# Patient Record
Sex: MALE | Race: WHITE | NOT HISPANIC OR LATINO | Employment: OTHER | URBAN - METROPOLITAN AREA
[De-identification: names, ages, dates, MRNs, and addresses within clinical notes are randomized per-mention and may not be internally consistent; named-entity substitution may affect disease eponyms.]

---

## 2020-05-07 ENCOUNTER — LAB REQUISITION (OUTPATIENT)
Dept: LAB | Facility: HOSPITAL | Age: 65
End: 2020-05-07

## 2020-05-07 DIAGNOSIS — Z00.00 ENCOUNTER FOR GENERAL ADULT MEDICAL EXAMINATION WITHOUT ABNORMAL FINDINGS: ICD-10-CM

## 2020-05-07 PROCEDURE — U0003 INFECTIOUS AGENT DETECTION BY NUCLEIC ACID (DNA OR RNA); SEVERE ACUTE RESPIRATORY SYNDROME CORONAVIRUS 2 (SARS-COV-2) (CORONAVIRUS DISEASE [COVID-19]), AMPLIFIED PROBE TECHNIQUE, MAKING USE OF HIGH THROUGHPUT TECHNOLOGIES AS DESCRIBED BY CMS-2020-01-R: HCPCS | Performed by: EMERGENCY MEDICINE

## 2020-05-08 LAB — SARS-COV-2 RNA RESP QL NAA+PROBE: NOT DETECTED

## 2020-08-25 ENCOUNTER — HOSPITAL ENCOUNTER (OUTPATIENT)
Dept: GENERAL RADIOLOGY | Facility: HOSPITAL | Age: 65
Discharge: HOME | End: 2020-08-25
Admitting: NURSE PRACTITIONER

## 2020-08-25 ENCOUNTER — OFFICE VISIT (OUTPATIENT)
Dept: FAMILY MEDICINE CLINIC | Facility: CLINIC | Age: 65
End: 2020-08-25

## 2020-08-25 VITALS
HEART RATE: 92 BPM | SYSTOLIC BLOOD PRESSURE: 120 MMHG | HEIGHT: 75 IN | WEIGHT: 222 LBS | DIASTOLIC BLOOD PRESSURE: 74 MMHG | TEMPERATURE: 98.9 F | BODY MASS INDEX: 27.6 KG/M2 | OXYGEN SATURATION: 96 %

## 2020-08-25 DIAGNOSIS — M54.2 NECK PAIN: Primary | ICD-10-CM

## 2020-08-25 DIAGNOSIS — M54.2 NECK PAIN: ICD-10-CM

## 2020-08-25 PROCEDURE — 99213 OFFICE O/P EST LOW 20 MIN: CPT | Performed by: NURSE PRACTITIONER

## 2020-08-25 PROCEDURE — 72040 X-RAY EXAM NECK SPINE 2-3 VW: CPT

## 2020-08-25 RX ORDER — CYCLOBENZAPRINE HCL 10 MG
10 TABLET ORAL 3 TIMES DAILY PRN
Qty: 30 TABLET | Refills: 0 | Status: SHIPPED | OUTPATIENT
Start: 2020-08-25 | End: 2021-08-14

## 2020-08-25 RX ORDER — IBUPROFEN 800 MG/1
800 TABLET ORAL EVERY 8 HOURS PRN
Qty: 30 TABLET | Refills: 1 | Status: SHIPPED | OUTPATIENT
Start: 2020-08-25 | End: 2021-08-14

## 2020-08-25 NOTE — PROGRESS NOTES
Subjective   Gil Garcia is a 65 y.o. male.       HPI   Pt. Is here today with concern of neck pain that is radiating down the right arm X 3-4 days.  No known injury.  He reports symptoms worsen with movement.  Has felt some numb/tingling at times in the arm.  Doesn't like to take medication but has taken otc ibuprofen a few times; can't say for sure if it provided much relief.  Denies any swelling, redness or warmth.        The following portions of the patient's history were reviewed and updated as appropriate: allergies, current medications, past family history, past medical history, past social history, past surgical history and problem list.    Review of Systems   Constitutional: Negative for activity change, appetite change, chills, diaphoresis, fatigue, fever, unexpected weight gain and unexpected weight loss.   Eyes: Negative for blurred vision, double vision and visual disturbance.   Respiratory: Negative for cough, chest tightness, shortness of breath and wheezing.    Cardiovascular: Negative for chest pain, palpitations and leg swelling.   Musculoskeletal: Positive for myalgias and neck pain. Negative for neck stiffness.   Skin: Negative for rash.   Neurological: Negative for dizziness, tremors, syncope, weakness, light-headedness, numbness, headache and confusion.   Psychiatric/Behavioral: Negative for depressed mood. The patient is not nervous/anxious.        Objective   Physical Exam   Constitutional: He is oriented to person, place, and time. He appears well-developed and well-nourished. No distress.   Cardiovascular: Normal rate, regular rhythm, normal heart sounds and intact distal pulses.   No murmur heard.  Pulmonary/Chest: Effort normal and breath sounds normal. No respiratory distress. He has no wheezes. He exhibits no tenderness.   Musculoskeletal: He exhibits no edema or deformity.        Cervical back: He exhibits decreased range of motion, tenderness and spasm (right side trapeizus ). He  exhibits no bony tenderness, no swelling, no edema, no deformity, no laceration and normal pulse.   Neurological: He is alert and oriented to person, place, and time.   Vitals reviewed.        Assessment/Plan   Gil was seen today for neck pain.    Diagnoses and all orders for this visit:    Neck pain  Comments:  Xray of c-spine.   Given Ibuprofen 800 mg PRN and Flexeril PRN.   Heat/ice and rest.   Gentle stretches.   Orders:  -     XR Spine Cervical 2 or 3 View; Future  -     cyclobenzaprine (FLEXERIL) 10 MG tablet; Take 1 tablet by mouth 3 (Three) Times a Day As Needed for Muscle Spasms.  -     ibuprofen (ADVIL,MOTRIN) 800 MG tablet; Take 1 tablet by mouth Every 8 (Eight) Hours As Needed for Mild Pain  or Moderate Pain .

## 2020-08-25 NOTE — PATIENT INSTRUCTIONS
Cervical Radiculopathy    Cervical radiculopathy means that a nerve in the neck (a cervical nerve) is pinched or bruised. This can happen because of an injury to the cervical spine (vertebrae) in the neck, or as a normal part of getting older. This can cause pain or loss of feeling (numbness) that runs from your neck all the way down to your arm and fingers. Often, this condition gets better with rest. Treatment may be needed if the condition does not get better.  What are the causes?  · A neck injury.  · A bulging disk in your spine.  · Muscle movements that you cannot control (muscle spasms).  · Tight muscles in your neck due to overuse.  · Arthritis.  · Breakdown in the bones and joints of the spine (spondylosis) due to getting older.  · Bone spurs that form near the nerves in the neck.  What are the signs or symptoms?  · Pain. The pain may:  ? Run from the neck to the arm and hand.  ? Be very bad or irritating.  ? Be worse when you move your neck.  · Loss of feeling or tingling in your arm or hand.  · Weakness in your arm or hand, in very bad cases.  How is this treated?  In many cases, treatment is not needed for this condition. With rest, the condition often gets better over time. If treatment is needed, options may include:  · Wearing a soft neck collar (cervical collar) for short periods of time, as told by your doctor.  · Doing exercises (physical therapy) to strengthen your neck muscles.  · Taking medicines.  · Having shots (injections) in your spine, in very bad cases.  · Having surgery. This may be needed if other treatments do not help. The type of surgery that is used depends on the cause of your condition.  Follow these instructions at home:  If you have a soft neck collar:  · Wear it as told by your doctor. Remove it only as told by your doctor.  · Ask your doctor if you can remove the collar for cleaning and bathing. If you are allowed to remove the collar for cleaning or bathing:  ? Follow  instructions from your doctor about how to remove the collar safely.  ? Clean the collar by wiping it with mild soap and water and drying it completely.  ? Take out any removable pads in the collar every 1-2 days. Wash them by hand with soap and water. Let them air-dry completely before you put them back in the collar.  ? Check your skin under the collar for redness or sores. If you see any, tell your doctor.  Managing pain         · Take over-the-counter and prescription medicines only as told by your doctor.  · If told, put ice on the painful area.  ? If you have a soft neck collar, remove it as told by your doctor.  ? Put ice in a plastic bag.  ? Place a towel between your skin and the bag.  ? Leave the ice on for 20 minutes, 2-3 times a day.  · If using ice does not help, you can try using heat. Use the heat source that your doctor recommends, such as a moist heat pack or a heating pad.  ? Place a towel between your skin and the heat source.  ? Leave the heat on for 20-30 minutes.  ? Remove the heat if your skin turns bright red. This is very important if you are unable to feel pain, heat, or cold. You may have a greater risk of getting burned.  · You may try a gentle neck and shoulder rub (massage).  Activity  · Rest as needed.  · Return to your normal activities as told by your doctor. Ask your doctor what activities are safe for you.  · Do exercises as told by your doctor or physical therapist.  · Do not lift anything that is heavier than 10 lb (4.5 kg) until your doctor tells you that it is safe.  General instructions  · Use a flat pillow when you sleep.  · Do not drive while wearing a soft neck collar. If you do not have a soft neck collar, ask your doctor if it is safe to drive while your neck heals.  · Ask your doctor if the medicine prescribed to you requires you to avoid driving or using heavy machinery.  · Do not use any products that contain nicotine or tobacco, such as cigarettes, e-cigarettes, and  chewing tobacco. These can delay healing. If you need help quitting, ask your doctor.  · Keep all follow-up visits as told by your doctor. This is important.  Contact a doctor if:  · Your condition does not get better with treatment.  Get help right away if:  · Your pain gets worse and is not helped with medicine.  · You lose feeling or feel weak in your hand, arm, face, or leg.  · You have a high fever.  · You have a stiff neck.  · You cannot control when you poop or pee (have incontinence).  · You have trouble with walking, balance, or talking.  Summary  · Cervical radiculopathy means that a nerve in the neck is pinched or bruised.  · A nerve can get pinched from a bulging disk, arthritis, an injury to the neck, or other causes.  · Symptoms include pain, tingling, or loss of feeling that goes from the neck into the arm or hand.  · Weakness in your arm or hand can happen in very bad cases.  · Treatment may include resting, wearing a soft neck collar, and doing exercises. You might need to take medicines for pain. In very bad cases, shots or surgery may be needed.  This information is not intended to replace advice given to you by your health care provider. Make sure you discuss any questions you have with your health care provider.  Document Released: 12/06/2012 Document Revised: 11/08/2019 Document Reviewed: 11/08/2019  Elsevier Patient Education © 2020 [employer]

## 2020-09-14 ENCOUNTER — TELEPHONE (OUTPATIENT)
Dept: FAMILY MEDICINE CLINIC | Facility: CLINIC | Age: 65
End: 2020-09-14

## 2020-09-14 DIAGNOSIS — M25.511 ACUTE PAIN OF RIGHT SHOULDER: Primary | ICD-10-CM

## 2020-09-14 NOTE — TELEPHONE ENCOUNTER
"Patient is still having a lot of issues with his neck, shoulder and arm. He said his meds are gone and the ibuprofen only eased the pain a little. He said the pain would return full force after about 4-5 hours after taking the ibuprofen. He said the muscle relaxer provided no relief. He said the mornings are really bad and almost \"too much\" he has to use his arms a lot at work and says it is progressively getting worse throughout the day. He is wanting to know what he should do?  "

## 2020-09-21 ENCOUNTER — OFFICE VISIT (OUTPATIENT)
Dept: ORTHOPEDIC SURGERY | Facility: CLINIC | Age: 65
End: 2020-09-21

## 2020-09-21 VITALS
BODY MASS INDEX: 28.1 KG/M2 | SYSTOLIC BLOOD PRESSURE: 157 MMHG | HEIGHT: 75 IN | HEART RATE: 91 BPM | WEIGHT: 226 LBS | DIASTOLIC BLOOD PRESSURE: 88 MMHG

## 2020-09-21 DIAGNOSIS — M25.511 ACUTE PAIN OF RIGHT SHOULDER: Primary | ICD-10-CM

## 2020-09-21 DIAGNOSIS — M54.12 CERVICAL RADICULOPATHY: ICD-10-CM

## 2020-09-21 PROCEDURE — 99203 OFFICE O/P NEW LOW 30 MIN: CPT | Performed by: ORTHOPAEDIC SURGERY

## 2020-09-21 NOTE — PROGRESS NOTES
"     Patient ID: Gil Garcia is a 65 y.o. male.    Chief Complaint:    Chief Complaint   Patient presents with   • Right Shoulder - Initial Evaluation, Pain       HPI:  Gil is a 65-year-old gentleman here in consultation for neck and right arm pain from Flavia Duggan.  Each has been present for about a month.  There is no injury.  He has pain at the base of the neck into the shoulder blade and down the arm.  He also has pain when lifting and moving his arm.  It hurts to lie on his right side.  He has been taking ibuprofen and a muscle relaxant from his primary care physician as well as doing some neck and shoulder rehab therapy exercises without significant relief.  Pain is sharp and a 6/10  Past Medical History:   Diagnosis Date   • Kidney stone        No past surgical history on file.    No family history on file.       Social History     Occupational History   • Not on file   Tobacco Use   • Smoking status: Current Every Day Smoker   • Smokeless tobacco: Never Used   Substance and Sexual Activity   • Alcohol use: Not Currently   • Drug use: Not Currently   • Sexual activity: Not on file      Review of Systems   Cardiovascular: Negative for chest pain.   Musculoskeletal: Positive for arthralgias.       Objective:    /88   Pulse 91   Ht 190.5 cm (75\")   Wt 103 kg (226 lb)   BMI 28.25 kg/m²     Physical Examination:  He is a pleasant male in no distress. He is alert and oriented x3 and appears his stated age.  Right shoulder demonstrates no scars and no atrophy without areas of tenderness.  He is limited range of motion with a positive Spurling to the left and right.  Shoulder passive elevation is 160 degrees abduction 120 degrees X rotation 40 degrees.  He has mild pain and weakness on Speed, Carlos, supraspinatus testing.  Belly press and liftoff are 5/5 in 4/5.Sensory and motor exam are intact all distributions. Radial pulse is palpable and capillary refill is less than two seconds to all " digits    Imaging:  X-rays of the neck demonstrate arthritis at the C3-4 level, x-rays of the shoulder demonstrate impingement with well-maintained joint spaces    Assessment:  Right shoulder pain possible cuff tear and neck pain with possible cervical radiculopathy    Plan:  Recommend MRI of the neck and shoulder to evaluate for cervical issues versus cuff pathology.  See me after imaging          Disclaimer: Please note that areas of this note were completed with computer voice recognition software.  Quite often unanticipated grammatical, syntax, homophones, and other interpretive errors are inadvertently transcribed by the computer software. Please excuse any errors that have escaped final proofreading.

## 2021-02-03 ENCOUNTER — TELEPHONE (OUTPATIENT)
Dept: FAMILY MEDICINE CLINIC | Facility: CLINIC | Age: 66
End: 2021-02-03

## 2021-02-03 NOTE — TELEPHONE ENCOUNTER
PATIENT HAS TESTED POSITIVE FOR COVID.  HE IS NOT EXPERIENCING ANY SYMPTOMS.  PATIENT STATES HIS WIFE IS BEING TREATED AT [facility] WITH   REGENERON ANTIBODY INFUSION.  PATIENT WANTS TO KNOW IF HE CAN ALSO GET THIS INFUSION.  PATIENT HAS A SCHEDULED COVID VACCINE ON 2/20/21.  PLEASE ADVISE    PATIENT CALL BACK #: [phone]

## 2021-02-03 NOTE — TELEPHONE ENCOUNTER
Caller: Gil Garcia    Relationship: Self    Best call back number: [phone]    PATIENT RETURNED CALL, STATED HE IS NOT REGISTERED ON [facility] AND IS NOT FAMILIAR WITH HOW TO UPLOAD A COPY OF HIS TEST RESULT THERE    PATIENT WOULD LIKE TO CHECK IF THERE IS AN ALTERNATIVE OPTION

## 2021-02-03 NOTE — TELEPHONE ENCOUNTER
If he is asymptomatic, he does not meet the criteria for the antibody infusion.      It is recommended he wait until he is 90 days from the date of his positive result to get the vaccine.      Can he get us a copy of his positive result?

## 2021-02-08 ENCOUNTER — HOSPITAL ENCOUNTER (OUTPATIENT)
Dept: INFUSION THERAPY | Facility: HOSPITAL | Age: 66
Discharge: HOME | End: 2021-02-08
Admitting: NURSE PRACTITIONER

## 2021-02-08 ENCOUNTER — TELEPHONE (OUTPATIENT)
Dept: FAMILY MEDICINE CLINIC | Facility: CLINIC | Age: 66
End: 2021-02-08

## 2021-02-08 VITALS
HEART RATE: 85 BPM | TEMPERATURE: 99.8 F | OXYGEN SATURATION: 97 % | DIASTOLIC BLOOD PRESSURE: 91 MMHG | SYSTOLIC BLOOD PRESSURE: 152 MMHG | RESPIRATION RATE: 20 BRPM

## 2021-02-08 DIAGNOSIS — U07.1 COVID-19: Primary | ICD-10-CM

## 2021-02-08 PROCEDURE — M0239 BAMLANIVIMAB-XXXX INFUSION: HCPCS

## 2021-02-08 PROCEDURE — 25010000006 BAMLANIVIMAB 700 MG/20ML SOLUTION 20 ML VIAL

## 2021-02-08 PROCEDURE — 96365 THER/PROPH/DIAG IV INF INIT: CPT

## 2021-02-08 RX ORDER — DIPHENHYDRAMINE HYDROCHLORIDE 50 MG/ML
50 INJECTION INTRAMUSCULAR; INTRAVENOUS AS NEEDED
Status: CANCELLED | OUTPATIENT
Start: 2021-02-08

## 2021-02-08 RX ORDER — DIPHENHYDRAMINE HYDROCHLORIDE 50 MG/ML
50 INJECTION INTRAMUSCULAR; INTRAVENOUS AS NEEDED
Status: DISCONTINUED | OUTPATIENT
Start: 2021-02-08 | End: 2021-02-10 | Stop reason: HOSPADM

## 2021-02-08 RX ORDER — EPINEPHRINE 1 MG/ML
0.3 INJECTION, SOLUTION, CONCENTRATE INTRAVENOUS AS NEEDED
Status: CANCELLED | OUTPATIENT
Start: 2021-02-08

## 2021-02-08 RX ORDER — METHYLPREDNISOLONE SODIUM SUCCINATE 125 MG/2ML
125 INJECTION, POWDER, LYOPHILIZED, FOR SOLUTION INTRAMUSCULAR; INTRAVENOUS AS NEEDED
Status: CANCELLED | OUTPATIENT
Start: 2021-02-08

## 2021-02-08 RX ORDER — EPINEPHRINE 1 MG/ML
0.3 INJECTION, SOLUTION, CONCENTRATE INTRAVENOUS AS NEEDED
Status: DISCONTINUED | OUTPATIENT
Start: 2021-02-08 | End: 2021-02-10 | Stop reason: HOSPADM

## 2021-02-08 RX ORDER — EPINEPHRINE 1 MG/ML
0.3 INJECTION, SOLUTION INTRAMUSCULAR; SUBCUTANEOUS AS NEEDED
Status: CANCELLED | OUTPATIENT
Start: 2021-02-08

## 2021-02-08 RX ORDER — METHYLPREDNISOLONE SODIUM SUCCINATE 125 MG/2ML
125 INJECTION, POWDER, LYOPHILIZED, FOR SOLUTION INTRAMUSCULAR; INTRAVENOUS AS NEEDED
Status: DISCONTINUED | OUTPATIENT
Start: 2021-02-08 | End: 2021-02-10 | Stop reason: HOSPADM

## 2021-02-08 RX ADMIN — SODIUM CHLORIDE 700 MG: 9 INJECTION, SOLUTION INTRAVENOUS at 15:46

## 2021-02-08 NOTE — TELEPHONE ENCOUNTER
Patient contacted and did give verbal consent for the COVID-19 Monoclonal Antibody Infusion.  Pt. verbally given fact sheet regarding infusion; informed of alternatives to the infusion and explained that the infusion is an unapproved drug authorized for emergency use.  He verbalized understanding.     Will fax order to [facility] now (Janine).

## 2021-02-08 NOTE — PROGRESS NOTES
"Pump continuously alarmed for \"Air in Line.\" Multiple attempts were made to ensure no air was in the line. Two different pumps were used as well as three channels. Second RN, Tobi, present to inspect for errors. After talking with Kellie in the pharmacy we deduced that the tubing is more likely the culprit, but since the tubing had been primed and we did not want to waste the medication, we administered the medicine at 87.5 gtts. Medicine administered without any further complications.  "

## 2021-02-08 NOTE — TELEPHONE ENCOUNTER
Provider: Mary Duggan APRN  Caller: Gil Garcia   Relationship to Patient: (Self)    Phone Number: [phone] (H)  Reason for Call: PATIENT CALLED TO LET MARY DUGGAN KNOW ABOUT HIS SYMPTOMS OF COVID- LOSS OF TASTE AND SMELL IS GONE- PULSE [facility]X- [address] ALL WEEKEND. PATIENT IS ALSO WANTING TO KNOW IF HE CAN HAVE THE ANTIBODY INFUSION DONE AT [facility].     PLEASE CONTACT TO ADVISE.       THANKS

## 2021-08-14 ENCOUNTER — APPOINTMENT (OUTPATIENT)
Dept: GENERAL RADIOLOGY | Facility: HOSPITAL | Age: 66
End: 2021-08-14

## 2021-08-14 ENCOUNTER — APPOINTMENT (OUTPATIENT)
Dept: CT IMAGING | Facility: HOSPITAL | Age: 66
End: 2021-08-14

## 2021-08-14 ENCOUNTER — HOSPITAL ENCOUNTER (INPATIENT)
Facility: HOSPITAL | Age: 66
LOS: 2 days | Discharge: HOME | End: 2021-08-17
Attending: EMERGENCY MEDICINE | Admitting: INTERNAL MEDICINE

## 2021-08-14 DIAGNOSIS — R07.9 CHEST PAIN, UNSPECIFIED TYPE: ICD-10-CM

## 2021-08-14 DIAGNOSIS — I20.0 UNSTABLE ANGINA (HCC): Primary | ICD-10-CM

## 2021-08-14 LAB
ALBUMIN SERPL-MCNC: 4 G/DL (ref 3.5–5.2)
ALBUMIN/GLOB SERPL: 1.3 G/DL
ALP SERPL-CCNC: 74 U/L (ref 39–117)
ALT SERPL W P-5'-P-CCNC: 36 U/L (ref 1–41)
ANION GAP SERPL CALCULATED.3IONS-SCNC: 15 MMOL/L (ref 5–15)
APTT PPP: 21.8 SECONDS (ref 24–31)
AST SERPL-CCNC: 25 U/L (ref 1–40)
BASOPHILS # BLD AUTO: 0.1 10*3/MM3 (ref 0–0.2)
BASOPHILS NFR BLD AUTO: 1.3 % (ref 0–1.5)
BILIRUB SERPL-MCNC: 0.4 MG/DL (ref 0–1.2)
BUN SERPL-MCNC: 21 MG/DL (ref 8–23)
BUN/CREAT SERPL: 22.8 (ref 7–25)
CALCIUM SPEC-SCNC: 9.2 MG/DL (ref 8.6–10.5)
CHLORIDE SERPL-SCNC: 104 MMOL/L (ref 98–107)
CHOLEST SERPL-MCNC: 207 MG/DL (ref 0–200)
CO2 SERPL-SCNC: 20 MMOL/L (ref 22–29)
CREAT SERPL-MCNC: 0.92 MG/DL (ref 0.76–1.27)
D DIMER PPP FEU-MCNC: 0.75 MG/L (FEU) (ref 0–0.59)
DEPRECATED RDW RBC AUTO: 44.2 FL (ref 37–54)
EOSINOPHIL # BLD AUTO: 0.3 10*3/MM3 (ref 0–0.4)
EOSINOPHIL NFR BLD AUTO: 2.6 % (ref 0.3–6.2)
ERYTHROCYTE [DISTWIDTH] IN BLOOD BY AUTOMATED COUNT: 14.9 % (ref 12.3–15.4)
GFR SERPL CREATININE-BSD FRML MDRD: 82 ML/MIN/1.73
GLOBULIN UR ELPH-MCNC: 3.1 GM/DL
GLUCOSE SERPL-MCNC: 148 MG/DL (ref 65–99)
HCT VFR BLD AUTO: 48.6 % (ref 37.5–51)
HDLC SERPL-MCNC: 38 MG/DL (ref 40–60)
HGB BLD-MCNC: 16.3 G/DL (ref 13–17.7)
INR PPP: 0.97 (ref 0.93–1.1)
LDLC SERPL CALC-MCNC: 150 MG/DL (ref 0–100)
LDLC/HDLC SERPL: 3.89 {RATIO}
LYMPHOCYTES # BLD AUTO: 3.5 10*3/MM3 (ref 0.7–3.1)
LYMPHOCYTES NFR BLD AUTO: 35.8 % (ref 19.6–45.3)
MCH RBC QN AUTO: 28.2 PG (ref 26.6–33)
MCHC RBC AUTO-ENTMCNC: 33.6 G/DL (ref 31.5–35.7)
MCV RBC AUTO: 84.1 FL (ref 79–97)
MONOCYTES # BLD AUTO: 0.6 10*3/MM3 (ref 0.1–0.9)
MONOCYTES NFR BLD AUTO: 5.9 % (ref 5–12)
NEUTROPHILS NFR BLD AUTO: 5.3 10*3/MM3 (ref 1.7–7)
NEUTROPHILS NFR BLD AUTO: 54.4 % (ref 42.7–76)
NRBC BLD AUTO-RTO: 0.1 /100 WBC (ref 0–0.2)
PLATELET # BLD AUTO: 266 10*3/MM3 (ref 140–450)
PMV BLD AUTO: 8.5 FL (ref 6–12)
POTASSIUM SERPL-SCNC: 3.6 MMOL/L (ref 3.5–5.2)
PROT SERPL-MCNC: 7.1 G/DL (ref 6–8.5)
PROTHROMBIN TIME: 10.8 SECONDS (ref 9.6–11.7)
QT INTERVAL: 414 MS
RBC # BLD AUTO: 5.78 10*6/MM3 (ref 4.14–5.8)
SARS-COV-2 RNA PNL SPEC NAA+PROBE: NOT DETECTED
SODIUM SERPL-SCNC: 139 MMOL/L (ref 136–145)
TRIGL SERPL-MCNC: 106 MG/DL (ref 0–150)
TROPONIN T SERPL-MCNC: 0.03 NG/ML (ref 0–0.03)
TROPONIN T SERPL-MCNC: <0.01 NG/ML (ref 0–0.03)
VLDLC SERPL-MCNC: 19 MG/DL (ref 5–40)
WBC # BLD AUTO: 9.8 10*3/MM3 (ref 3.4–10.8)

## 2021-08-14 PROCEDURE — 87635 SARS-COV-2 COVID-19 AMP PRB: CPT | Performed by: EMERGENCY MEDICINE

## 2021-08-14 PROCEDURE — 85025 COMPLETE CBC W/AUTO DIFF WBC: CPT | Performed by: EMERGENCY MEDICINE

## 2021-08-14 PROCEDURE — 71275 CT ANGIOGRAPHY CHEST: CPT

## 2021-08-14 PROCEDURE — 0 IOPAMIDOL PER 1 ML: Performed by: EMERGENCY MEDICINE

## 2021-08-14 PROCEDURE — 99222 1ST HOSP IP/OBS MODERATE 55: CPT | Performed by: NURSE PRACTITIONER

## 2021-08-14 PROCEDURE — 71045 X-RAY EXAM CHEST 1 VIEW: CPT

## 2021-08-14 PROCEDURE — 25010000002 MORPHINE PER 10 MG: Performed by: NURSE PRACTITIONER

## 2021-08-14 PROCEDURE — 93005 ELECTROCARDIOGRAM TRACING: CPT

## 2021-08-14 PROCEDURE — 99285 EMERGENCY DEPT VISIT HI MDM: CPT

## 2021-08-14 PROCEDURE — 25010000002 HEPARIN (PORCINE) 25000-0.45 UT/250ML-% SOLUTION: Performed by: EMERGENCY MEDICINE

## 2021-08-14 PROCEDURE — 93005 ELECTROCARDIOGRAM TRACING: CPT | Performed by: EMERGENCY MEDICINE

## 2021-08-14 PROCEDURE — 83036 HEMOGLOBIN GLYCOSYLATED A1C: CPT | Performed by: NURSE PRACTITIONER

## 2021-08-14 PROCEDURE — 85730 THROMBOPLASTIN TIME PARTIAL: CPT | Performed by: EMERGENCY MEDICINE

## 2021-08-14 PROCEDURE — G0378 HOSPITAL OBSERVATION PER HR: HCPCS

## 2021-08-14 PROCEDURE — 36415 COLL VENOUS BLD VENIPUNCTURE: CPT | Performed by: EMERGENCY MEDICINE

## 2021-08-14 PROCEDURE — 85610 PROTHROMBIN TIME: CPT | Performed by: EMERGENCY MEDICINE

## 2021-08-14 PROCEDURE — 84484 ASSAY OF TROPONIN QUANT: CPT | Performed by: EMERGENCY MEDICINE

## 2021-08-14 PROCEDURE — 99205 OFFICE O/P NEW HI 60 MIN: CPT | Performed by: INTERNAL MEDICINE

## 2021-08-14 PROCEDURE — 80061 LIPID PANEL: CPT | Performed by: NURSE PRACTITIONER

## 2021-08-14 PROCEDURE — 80053 COMPREHEN METABOLIC PANEL: CPT | Performed by: EMERGENCY MEDICINE

## 2021-08-14 PROCEDURE — 85379 FIBRIN DEGRADATION QUANT: CPT | Performed by: EMERGENCY MEDICINE

## 2021-08-14 RX ORDER — SODIUM CHLORIDE 0.9 % (FLUSH) 0.9 %
10 SYRINGE (ML) INJECTION AS NEEDED
Status: DISCONTINUED | OUTPATIENT
Start: 2021-08-14 | End: 2021-08-17 | Stop reason: HOSPADM

## 2021-08-14 RX ORDER — MORPHINE SULFATE 4 MG/ML
2 INJECTION, SOLUTION INTRAMUSCULAR; INTRAVENOUS ONCE
Status: COMPLETED | OUTPATIENT
Start: 2021-08-14 | End: 2021-08-14

## 2021-08-14 RX ORDER — SODIUM CHLORIDE 0.9 % (FLUSH) 0.9 %
10 SYRINGE (ML) INJECTION EVERY 12 HOURS SCHEDULED
Status: DISCONTINUED | OUTPATIENT
Start: 2021-08-14 | End: 2021-08-17 | Stop reason: HOSPADM

## 2021-08-14 RX ORDER — ASPIRIN 81 MG/1
324 TABLET, CHEWABLE ORAL ONCE
Status: DISCONTINUED | OUTPATIENT
Start: 2021-08-14 | End: 2021-08-17 | Stop reason: HOSPADM

## 2021-08-14 RX ORDER — ONDANSETRON 2 MG/ML
4 INJECTION INTRAMUSCULAR; INTRAVENOUS EVERY 6 HOURS PRN
Status: DISCONTINUED | OUTPATIENT
Start: 2021-08-14 | End: 2021-08-17 | Stop reason: HOSPADM

## 2021-08-14 RX ORDER — MAGNESIUM SULFATE 1 G/100ML
1 INJECTION INTRAVENOUS AS NEEDED
Status: DISCONTINUED | OUTPATIENT
Start: 2021-08-14 | End: 2021-08-17 | Stop reason: HOSPADM

## 2021-08-14 RX ORDER — ASPIRIN 81 MG/1
81 TABLET ORAL DAILY
Status: DISCONTINUED | OUTPATIENT
Start: 2021-08-15 | End: 2021-08-17 | Stop reason: HOSPADM

## 2021-08-14 RX ORDER — ATORVASTATIN CALCIUM 20 MG/1
20 TABLET, FILM COATED ORAL NIGHTLY
Status: DISCONTINUED | OUTPATIENT
Start: 2021-08-14 | End: 2021-08-15

## 2021-08-14 RX ORDER — ACETAMINOPHEN 160 MG/5ML
650 SOLUTION ORAL EVERY 4 HOURS PRN
Status: DISCONTINUED | OUTPATIENT
Start: 2021-08-14 | End: 2021-08-17 | Stop reason: HOSPADM

## 2021-08-14 RX ORDER — ONDANSETRON 4 MG/1
4 TABLET, FILM COATED ORAL EVERY 6 HOURS PRN
Status: DISCONTINUED | OUTPATIENT
Start: 2021-08-14 | End: 2021-08-17 | Stop reason: HOSPADM

## 2021-08-14 RX ORDER — CLOPIDOGREL BISULFATE 75 MG/1
75 TABLET ORAL DAILY
Status: DISCONTINUED | OUTPATIENT
Start: 2021-08-15 | End: 2021-08-15

## 2021-08-14 RX ORDER — CLOPIDOGREL BISULFATE 75 MG/1
600 TABLET ORAL ONCE
Status: COMPLETED | OUTPATIENT
Start: 2021-08-14 | End: 2021-08-14

## 2021-08-14 RX ORDER — ALUMINA, MAGNESIA, AND SIMETHICONE 2400; 2400; 240 MG/30ML; MG/30ML; MG/30ML
15 SUSPENSION ORAL EVERY 6 HOURS PRN
Status: DISCONTINUED | OUTPATIENT
Start: 2021-08-14 | End: 2021-08-17 | Stop reason: HOSPADM

## 2021-08-14 RX ORDER — NITROGLYCERIN 0.4 MG/1
0.4 TABLET SUBLINGUAL
Status: DISCONTINUED | OUTPATIENT
Start: 2021-08-14 | End: 2021-08-17 | Stop reason: HOSPADM

## 2021-08-14 RX ORDER — POTASSIUM CHLORIDE 20 MEQ/1
40 TABLET, EXTENDED RELEASE ORAL AS NEEDED
Status: DISCONTINUED | OUTPATIENT
Start: 2021-08-14 | End: 2021-08-17 | Stop reason: HOSPADM

## 2021-08-14 RX ORDER — LISINOPRIL 5 MG/1
5 TABLET ORAL DAILY
Status: DISCONTINUED | OUTPATIENT
Start: 2021-08-15 | End: 2021-08-15

## 2021-08-14 RX ORDER — HEPARIN SODIUM 10000 [USP'U]/100ML
9.8 INJECTION, SOLUTION INTRAVENOUS
Status: DISCONTINUED | OUTPATIENT
Start: 2021-08-14 | End: 2021-08-15

## 2021-08-14 RX ORDER — ACETAMINOPHEN 325 MG/1
650 TABLET ORAL EVERY 4 HOURS PRN
Status: DISCONTINUED | OUTPATIENT
Start: 2021-08-14 | End: 2021-08-17 | Stop reason: HOSPADM

## 2021-08-14 RX ORDER — ACETAMINOPHEN 650 MG/1
650 SUPPOSITORY RECTAL EVERY 4 HOURS PRN
Status: DISCONTINUED | OUTPATIENT
Start: 2021-08-14 | End: 2021-08-17 | Stop reason: HOSPADM

## 2021-08-14 RX ORDER — MAGNESIUM SULFATE HEPTAHYDRATE 40 MG/ML
2 INJECTION, SOLUTION INTRAVENOUS AS NEEDED
Status: DISCONTINUED | OUTPATIENT
Start: 2021-08-14 | End: 2021-08-17 | Stop reason: HOSPADM

## 2021-08-14 RX ORDER — CHOLECALCIFEROL (VITAMIN D3) 125 MCG
5 CAPSULE ORAL NIGHTLY PRN
Status: DISCONTINUED | OUTPATIENT
Start: 2021-08-14 | End: 2021-08-17 | Stop reason: HOSPADM

## 2021-08-14 RX ADMIN — ATORVASTATIN CALCIUM 20 MG: 20 TABLET, FILM COATED ORAL at 23:52

## 2021-08-14 RX ADMIN — MORPHINE SULFATE 2 MG: 4 INJECTION INTRAVENOUS at 21:53

## 2021-08-14 RX ADMIN — HEPARIN SODIUM 9.8 UNITS/KG/HR: 10000 INJECTION, SOLUTION INTRAVENOUS at 19:03

## 2021-08-14 RX ADMIN — CLOPIDOGREL BISULFATE 600 MG: 75 TABLET ORAL at 18:55

## 2021-08-14 RX ADMIN — IOPAMIDOL 100 ML: 755 INJECTION, SOLUTION INTRAVENOUS at 17:32

## 2021-08-14 RX ADMIN — SODIUM CHLORIDE 500 ML: 9 INJECTION, SOLUTION INTRAVENOUS at 16:20

## 2021-08-14 RX ADMIN — METOPROLOL TARTRATE 25 MG: 25 TABLET, FILM COATED ORAL at 23:52

## 2021-08-14 NOTE — ED PROVIDER NOTES
"Subjective   Chief complaint: Patient is a pleasant 66-year-old who presents with chest pain.  He states that he was on the couch.  No exertional activity today.  He went up to get the door for his wife.  He states he got overwhelmingly \"hot\".  He felt weak all over and fell to the floor.  He developed midsternal chest pain to his back down both arms.  He states that the chest pain is now gone.  He still feels a lot of pain in his arms.  He does not see a physician on a regular basis.  He has a strong family history.  EMS gave aspirin in route.    Context: As above    Duration: Just prior to arrival under an hour    Timing: Sudden onset.    Severity: Severe symptoms now moderate    Associated Symptoms: Negative except as noted above.  Appropriate PPE was used.        PCP:            Review of Systems   Constitutional: Positive for fatigue. Negative for fever.   HENT: Negative.    Eyes: Negative.    Respiratory: Negative for shortness of breath.    Cardiovascular: Positive for chest pain.   Gastrointestinal: Negative for abdominal pain.   Musculoskeletal: Positive for back pain.        Bilateral arm pain   All other systems reviewed and are negative.      Past Medical History:   Diagnosis Date   • Kidney stone        No Known Allergies    No past surgical history on file.    No family history on file.    Social History     Socioeconomic History   • Marital status:      Spouse name: Not on file   • Number of children: Not on file   • Years of education: Not on file   • Highest education level: Not on file   Tobacco Use   • Smoking status: Current Every Day Smoker     Packs/day: 0.25   • Smokeless tobacco: Never Used   Substance and Sexual Activity   • Alcohol use: Not Currently   • Drug use: Not Currently           Objective   Physical Exam  Vitals and nursing note reviewed.   Constitutional:       Appearance: He is well-developed.   HENT:      Head: Normocephalic.   Eyes:      Extraocular Movements: " Extraocular movements intact.      Pupils: Pupils are equal, round, and reactive to light.   Cardiovascular:      Heart sounds: Normal heart sounds.   Pulmonary:      Effort: Pulmonary effort is normal.      Breath sounds: Normal breath sounds.   Abdominal:      Tenderness: There is no abdominal tenderness.   Musculoskeletal:      Right lower leg: No tenderness. No edema.      Left lower leg: No tenderness. No edema.   Skin:     General: Skin is warm and dry.   Neurological:      General: No focal deficit present.      Mental Status: He is alert and oriented to person, place, and time.   Psychiatric:         Mood and Affect: Mood normal.         Behavior: Behavior normal.         Procedures           ED Course  ED Course as of Aug 14 1807   Sat Aug 14, 2021   1707 I spoke with Dr. Cruz    [LH]   1730 I spoke with  regarding the patient.  We went over the EKGs.  The inferior ST segments are concerning.  They are not greater than 1 mm elevated though.  Patient is not having active chest pain now.  His arm still feel uncomfortable and with mild discomfort.  He recommends Plavix load 600 mg and heparin drip.  With cycle enzymes.  If CT obtained start after CTA report    [LH]      ED Course User Index  [LH] Herve Johnson,       EKG interpreted myself shows sinus rhythm rate of 59.  There are some very mild ST changes inferior leads.  No reciprocal changes.  Repeat EKG interpreted by myself shows sinus rhythm rate of 62.  Still with subtle ST changes inferior leads.  No reciprocal changes.           Results for orders placed or performed during the hospital encounter of 08/14/21   COVID-19,CEPHEID/DANE/BDMAX,COR/EMMANUEL/PAD/BRENDA IN-HOUSE(OR EMERGENT/ADD-ON),NP SWAB IN TRANSPORT MEDIA 3-4 HR TAT, RT-PCR - Swab, Nasopharynx    Specimen: Nasopharynx; Swab   Result Value Ref Range    COVID19 Not Detected Not Detected - Ref. Range   Comprehensive Metabolic Panel    Specimen: Blood   Result Value Ref Range     Glucose 148 (H) 65 - 99 mg/dL    BUN 21 8 - 23 mg/dL    Creatinine 0.92 0.76 - 1.27 mg/dL    Sodium 139 136 - 145 mmol/L    Potassium 3.6 3.5 - 5.2 mmol/L    Chloride 104 98 - 107 mmol/L    CO2 20.0 (L) 22.0 - 29.0 mmol/L    Calcium 9.2 8.6 - 10.5 mg/dL    Total Protein 7.1 6.0 - 8.5 g/dL    Albumin 4.00 3.50 - 5.20 g/dL    ALT (SGPT) 36 1 - 41 U/L    AST (SGOT) 25 1 - 40 U/L    Alkaline Phosphatase 74 39 - 117 U/L    Total Bilirubin 0.4 0.0 - 1.2 mg/dL    eGFR Non African Amer 82 >60 mL/min/1.73    Globulin 3.1 gm/dL    A/G Ratio 1.3 g/dL    BUN/Creatinine Ratio 22.8 7.0 - 25.0    Anion Gap 15.0 5.0 - 15.0 mmol/L   Protime-INR    Specimen: Blood   Result Value Ref Range    Protime 10.8 9.6 - 11.7 Seconds    INR 0.97 0.93 - 1.10   aPTT    Specimen: Blood   Result Value Ref Range    PTT 21.8 (L) 24.0 - 31.0 seconds   Troponin    Specimen: Blood   Result Value Ref Range    Troponin T <0.010 0.000 - 0.030 ng/mL   CBC Auto Differential    Specimen: Blood   Result Value Ref Range    WBC 9.80 3.40 - 10.80 10*3/mm3    RBC 5.78 4.14 - 5.80 10*6/mm3    Hemoglobin 16.3 13.0 - 17.7 g/dL    Hematocrit 48.6 37.5 - 51.0 %    MCV 84.1 79.0 - 97.0 fL    MCH 28.2 26.6 - 33.0 pg    MCHC 33.6 31.5 - 35.7 g/dL    RDW 14.9 12.3 - 15.4 %    RDW-SD 44.2 37.0 - 54.0 fl    MPV 8.5 6.0 - 12.0 fL    Platelets 266 140 - 450 10*3/mm3    Neutrophil % 54.4 42.7 - 76.0 %    Lymphocyte % 35.8 19.6 - 45.3 %    Monocyte % 5.9 5.0 - 12.0 %    Eosinophil % 2.6 0.3 - 6.2 %    Basophil % 1.3 0.0 - 1.5 %    Neutrophils, Absolute 5.30 1.70 - 7.00 10*3/mm3    Lymphocytes, Absolute 3.50 (H) 0.70 - 3.10 10*3/mm3    Monocytes, Absolute 0.60 0.10 - 0.90 10*3/mm3    Eosinophils, Absolute 0.30 0.00 - 0.40 10*3/mm3    Basophils, Absolute 0.10 0.00 - 0.20 10*3/mm3    nRBC 0.1 0.0 - 0.2 /100 WBC   D-dimer, Quantitative    Specimen: Blood   Result Value Ref Range    D-Dimer, Quantitative 0.75 (H) 0.00 - 0.59 mg/L (FEU)   ECG 12 Lead   Result Value Ref Range    QT  Interval 426 ms     XR Chest 1 View    Result Date: 8/14/2021  No acute cardiopulmonary process.  Electronically Signed By-Irina Freeman MD On:8/14/2021 4:52 PM This report was finalized on [id number] by  Irina Freeman MD.    CT Chest Pulmonary Embolism    Result Date: 8/14/2021   1. No evidence of pulmonary embolus. 2. No acute cardiopulmonary process. 3. Ancillary findings as described above.     Electronically Signed By-Irina Freeman MD On:8/14/2021 5:39 PM This report was finalized on [id number] by  Irina Freeman MD.                                 MDM  Number of Diagnoses or Management Options  Diagnosis management comments: The patient had severe sudden onset chest pain down both arms to his back and causing him to collapse to the floor.  He did appear diaphoretic and uncomfortable when he first came to the emergency department via EMS.  CT chest showed no acute process.  Father has history of MI.  EKG changes inferiorly.  Discussed the case with cardiology.  They would like patient with a Plavix load of 600 mg and on a heparin drip.  This was ordered in the emergency department.  I discussed the patient with the hospitalist who agreed to admit the patient to their service.       Amount and/or Complexity of Data Reviewed  Clinical lab tests: reviewed  Tests in the radiology section of CPT®: reviewed  Tests in the medicine section of CPT®: reviewed  Discussion of test results with the performing providers: yes  Discuss the patient with other providers: yes  Independent visualization of images, tracings, or specimens: yes    Risk of Complications, Morbidity, and/or Mortality  Presenting problems: high    Patient Progress  Patient progress: stable      Final diagnoses:   None     Chest pain  Unstable angina  ED Disposition  ED Disposition     None          No follow-up provider specified.       Medication List      No changes were made to your prescriptions during this visit.          Herve Johnson,  DO  08/14/21 1809

## 2021-08-15 ENCOUNTER — APPOINTMENT (OUTPATIENT)
Dept: CARDIOLOGY | Facility: HOSPITAL | Age: 66
End: 2021-08-15

## 2021-08-15 LAB
ACT BLD: 125 SECONDS (ref 89–137)
ACT BLD: 219 SECONDS (ref 89–137)
ACT BLD: 246 SECONDS (ref 89–137)
ANION GAP SERPL CALCULATED.3IONS-SCNC: 7 MMOL/L (ref 5–15)
APTT PPP: 29.7 SECONDS (ref 61–76.5)
APTT PPP: 47.3 SECONDS (ref 61–76.5)
BASOPHILS # BLD AUTO: 0.1 10*3/MM3 (ref 0–0.2)
BASOPHILS NFR BLD AUTO: 0.7 % (ref 0–1.5)
BH CV ECHO MEAS - ACS: 1.9 CM
BH CV ECHO MEAS - AO MAX PG (FULL): -0.05 MMHG
BH CV ECHO MEAS - AO MAX PG: 3.7 MMHG
BH CV ECHO MEAS - AO MEAN PG (FULL): 0.19 MMHG
BH CV ECHO MEAS - AO MEAN PG: 1.9 MMHG
BH CV ECHO MEAS - AO ROOT AREA (BSA CORRECTED): 1.6
BH CV ECHO MEAS - AO ROOT AREA: 10.7 CM^2
BH CV ECHO MEAS - AO ROOT DIAM: 3.7 CM
BH CV ECHO MEAS - AO V2 MAX: 96.3 CM/SEC
BH CV ECHO MEAS - AO V2 MEAN: 64.2 CM/SEC
BH CV ECHO MEAS - AO V2 VTI: 18.6 CM
BH CV ECHO MEAS - AORTIC HR: 57.7 BPM
BH CV ECHO MEAS - AORTIC R-R: 1 SEC
BH CV ECHO MEAS - ASC AORTA: 3.5 CM
BH CV ECHO MEAS - AVA(I,A): 4.2 CM^2
BH CV ECHO MEAS - AVA(I,D): 4.2 CM^2
BH CV ECHO MEAS - AVA(V,A): 4.1 CM^2
BH CV ECHO MEAS - AVA(V,D): 4.1 CM^2
BH CV ECHO MEAS - BSA(HAYCOCK): 2.3 M^2
BH CV ECHO MEAS - BSA: 2.3 M^2
BH CV ECHO MEAS - BZI_BMI: 26.9 KILOGRAMS/M^2
BH CV ECHO MEAS - BZI_METRIC_HEIGHT: 190.5 CM
BH CV ECHO MEAS - BZI_METRIC_WEIGHT: 97.5 KG
BH CV ECHO MEAS - CI(AO): 5.1 L/MIN/M^2
BH CV ECHO MEAS - CI(LVOT): 2 L/MIN/M^2
BH CV ECHO MEAS - CO(AO): 11.5 L/MIN
BH CV ECHO MEAS - CO(LVOT): 4.5 L/MIN
BH CV ECHO MEAS - EDV(CUBED): 188.7 ML
BH CV ECHO MEAS - EDV(MOD-SP4): 97.1 ML
BH CV ECHO MEAS - EDV(TEICH): 162.4 ML
BH CV ECHO MEAS - EF(CUBED): 74.8 %
BH CV ECHO MEAS - EF(MOD-BP): 55 %
BH CV ECHO MEAS - EF(MOD-SP4): 55.2 %
BH CV ECHO MEAS - EF(TEICH): 66 %
BH CV ECHO MEAS - ESV(CUBED): 47.6 ML
BH CV ECHO MEAS - ESV(MOD-SP4): 43.4 ML
BH CV ECHO MEAS - ESV(TEICH): 55.3 ML
BH CV ECHO MEAS - FS: 36.8 %
BH CV ECHO MEAS - IVS/LVPW: 0.82
BH CV ECHO MEAS - IVSD: 0.9 CM
BH CV ECHO MEAS - LA DIMENSION(2D): 4.4 CM
BH CV ECHO MEAS - LV DIASTOLIC VOL/BSA (35-75): 42.9 ML/M^2
BH CV ECHO MEAS - LV MASS(C)D: 226.3 GRAMS
BH CV ECHO MEAS - LV MASS(C)DI: 100 GRAMS/M^2
BH CV ECHO MEAS - LV MAX PG: 3.8 MMHG
BH CV ECHO MEAS - LV MEAN PG: 1.7 MMHG
BH CV ECHO MEAS - LV SYSTOLIC VOL/BSA (12-30): 19.2 ML/M^2
BH CV ECHO MEAS - LV V1 MAX: 97 CM/SEC
BH CV ECHO MEAS - LV V1 MEAN: 59.4 CM/SEC
BH CV ECHO MEAS - LV V1 VTI: 19.2 CM
BH CV ECHO MEAS - LVIDD: 5.7 CM
BH CV ECHO MEAS - LVIDS: 3.6 CM
BH CV ECHO MEAS - LVOT AREA: 4.1 CM^2
BH CV ECHO MEAS - LVOT DIAM: 2.3 CM
BH CV ECHO MEAS - LVPWD: 1.1 CM
BH CV ECHO MEAS - MV A MAX VEL: 55.8 CM/SEC
BH CV ECHO MEAS - MV DEC SLOPE: 201.2 CM/SEC^2
BH CV ECHO MEAS - MV DEC TIME: 0.26 SEC
BH CV ECHO MEAS - MV E MAX VEL: 51.6 CM/SEC
BH CV ECHO MEAS - MV E/A: 0.92
BH CV ECHO MEAS - MV MAX PG: 1.7 MMHG
BH CV ECHO MEAS - MV MEAN PG: 0.75 MMHG
BH CV ECHO MEAS - MV V2 MAX: 64.3 CM/SEC
BH CV ECHO MEAS - MV V2 MEAN: 40.3 CM/SEC
BH CV ECHO MEAS - MV V2 VTI: 22.7 CM
BH CV ECHO MEAS - MVA(VTI): 3.5 CM^2
BH CV ECHO MEAS - PA ACC TIME: 0.1 SEC
BH CV ECHO MEAS - PA MAX PG (FULL): 1.3 MMHG
BH CV ECHO MEAS - PA MAX PG: 3 MMHG
BH CV ECHO MEAS - PA MEAN PG (FULL): 1.1 MMHG
BH CV ECHO MEAS - PA MEAN PG: 1.9 MMHG
BH CV ECHO MEAS - PA PR(ACCEL): 32.8 MMHG
BH CV ECHO MEAS - PA V2 MAX: 86.7 CM/SEC
BH CV ECHO MEAS - PA V2 MEAN: 66.1 CM/SEC
BH CV ECHO MEAS - PA V2 VTI: 18.9 CM
BH CV ECHO MEAS - RAP SYSTOLE: 3 MMHG
BH CV ECHO MEAS - RV MAX PG: 1.7 MMHG
BH CV ECHO MEAS - RV MEAN PG: 0.8 MMHG
BH CV ECHO MEAS - RV V1 MAX: 65 CM/SEC
BH CV ECHO MEAS - RV V1 MEAN: 41.5 CM/SEC
BH CV ECHO MEAS - RV V1 VTI: 13.7 CM
BH CV ECHO MEAS - RVDD: 1.7 CM
BH CV ECHO MEAS - RVSP: 15.7 MMHG
BH CV ECHO MEAS - SI(AO): 88 ML/M^2
BH CV ECHO MEAS - SI(CUBED): 62.4 ML/M^2
BH CV ECHO MEAS - SI(LVOT): 34.6 ML/M^2
BH CV ECHO MEAS - SI(MOD-SP4): 23.7 ML/M^2
BH CV ECHO MEAS - SI(TEICH): 47.3 ML/M^2
BH CV ECHO MEAS - SV(AO): 199 ML
BH CV ECHO MEAS - SV(CUBED): 141.2 ML
BH CV ECHO MEAS - SV(LVOT): 78.3 ML
BH CV ECHO MEAS - SV(MOD-SP4): 53.6 ML
BH CV ECHO MEAS - SV(TEICH): 107.1 ML
BH CV ECHO MEAS - TR MAX VEL: 178.4 CM/SEC
BUN SERPL-MCNC: 18 MG/DL (ref 8–23)
BUN/CREAT SERPL: 23.1 (ref 7–25)
CALCIUM SPEC-SCNC: 9.1 MG/DL (ref 8.6–10.5)
CHLORIDE SERPL-SCNC: 105 MMOL/L (ref 98–107)
CO2 SERPL-SCNC: 26 MMOL/L (ref 22–29)
CREAT SERPL-MCNC: 0.78 MG/DL (ref 0.76–1.27)
DEPRECATED RDW RBC AUTO: 43.3 FL (ref 37–54)
EOSINOPHIL # BLD AUTO: 0.1 10*3/MM3 (ref 0–0.4)
EOSINOPHIL NFR BLD AUTO: 0.5 % (ref 0.3–6.2)
ERYTHROCYTE [DISTWIDTH] IN BLOOD BY AUTOMATED COUNT: 14.6 % (ref 12.3–15.4)
GFR SERPL CREATININE-BSD FRML MDRD: 100 ML/MIN/1.73
GLUCOSE BLDC GLUCOMTR-MCNC: 104 MG/DL (ref 70–105)
GLUCOSE BLDC GLUCOMTR-MCNC: 114 MG/DL (ref 70–105)
GLUCOSE BLDC GLUCOMTR-MCNC: 119 MG/DL (ref 70–105)
GLUCOSE BLDC GLUCOMTR-MCNC: 166 MG/DL (ref 70–105)
GLUCOSE SERPL-MCNC: 131 MG/DL (ref 65–99)
HCT VFR BLD AUTO: 46.1 % (ref 37.5–51)
HGB BLD-MCNC: 15.4 G/DL (ref 13–17.7)
LYMPHOCYTES # BLD AUTO: 1.9 10*3/MM3 (ref 0.7–3.1)
LYMPHOCYTES NFR BLD AUTO: 17.8 % (ref 19.6–45.3)
MAGNESIUM SERPL-MCNC: 1.9 MG/DL (ref 1.6–2.4)
MCH RBC QN AUTO: 28.2 PG (ref 26.6–33)
MCHC RBC AUTO-ENTMCNC: 33.3 G/DL (ref 31.5–35.7)
MCV RBC AUTO: 84.6 FL (ref 79–97)
MONOCYTES # BLD AUTO: 0.6 10*3/MM3 (ref 0.1–0.9)
MONOCYTES NFR BLD AUTO: 5.4 % (ref 5–12)
NEUTROPHILS NFR BLD AUTO: 7.9 10*3/MM3 (ref 1.7–7)
NEUTROPHILS NFR BLD AUTO: 75.6 % (ref 42.7–76)
NRBC BLD AUTO-RTO: 0.1 /100 WBC (ref 0–0.2)
PLATELET # BLD AUTO: 192 10*3/MM3 (ref 140–450)
PMV BLD AUTO: 8.2 FL (ref 6–12)
POTASSIUM SERPL-SCNC: 4.4 MMOL/L (ref 3.5–5.2)
QT INTERVAL: 426 MS
RBC # BLD AUTO: 5.45 10*6/MM3 (ref 4.14–5.8)
SODIUM SERPL-SCNC: 138 MMOL/L (ref 136–145)
TROPONIN T SERPL-MCNC: 2.11 NG/ML (ref 0–0.03)
WBC # BLD AUTO: 10.4 10*3/MM3 (ref 3.4–10.8)

## 2021-08-15 PROCEDURE — C1725 CATH, TRANSLUMIN NON-LASER: HCPCS | Performed by: INTERNAL MEDICINE

## 2021-08-15 PROCEDURE — C1874 STENT, COATED/COV W/DEL SYS: HCPCS | Performed by: INTERNAL MEDICINE

## 2021-08-15 PROCEDURE — 82962 GLUCOSE BLOOD TEST: CPT

## 2021-08-15 PROCEDURE — 85347 COAGULATION TIME ACTIVATED: CPT

## 2021-08-15 PROCEDURE — 4A023N7 MEASUREMENT OF CARDIAC SAMPLING AND PRESSURE, LEFT HEART, PERCUTANEOUS APPROACH: ICD-10-PCS | Performed by: INTERNAL MEDICINE

## 2021-08-15 PROCEDURE — B2111ZZ FLUOROSCOPY OF MULTIPLE CORONARY ARTERIES USING LOW OSMOLAR CONTRAST: ICD-10-PCS | Performed by: INTERNAL MEDICINE

## 2021-08-15 PROCEDURE — 93458 L HRT ARTERY/VENTRICLE ANGIO: CPT | Performed by: INTERNAL MEDICINE

## 2021-08-15 PROCEDURE — C9600 PERC DRUG-EL COR STENT SING: HCPCS | Performed by: INTERNAL MEDICINE

## 2021-08-15 PROCEDURE — 93306 TTE W/DOPPLER COMPLETE: CPT

## 2021-08-15 PROCEDURE — 99233 SBSQ HOSP IP/OBS HIGH 50: CPT | Performed by: INTERNAL MEDICINE

## 2021-08-15 PROCEDURE — 25010000002 MAGNESIUM SULFATE IN D5W 1G/100ML (PREMIX) 1-5 GM/100ML-% SOLUTION: Performed by: NURSE PRACTITIONER

## 2021-08-15 PROCEDURE — 0 IOPAMIDOL PER 1 ML: Performed by: INTERNAL MEDICINE

## 2021-08-15 PROCEDURE — 92928 PRQ TCAT PLMT NTRAC ST 1 LES: CPT | Performed by: INTERNAL MEDICINE

## 2021-08-15 PROCEDURE — 80048 BASIC METABOLIC PNL TOTAL CA: CPT | Performed by: NURSE PRACTITIONER

## 2021-08-15 PROCEDURE — 83735 ASSAY OF MAGNESIUM: CPT | Performed by: NURSE PRACTITIONER

## 2021-08-15 PROCEDURE — 027035Z DILATION OF CORONARY ARTERY, ONE ARTERY WITH TWO DRUG-ELUTING INTRALUMINAL DEVICES, PERCUTANEOUS APPROACH: ICD-10-PCS | Performed by: INTERNAL MEDICINE

## 2021-08-15 PROCEDURE — 99152 MOD SED SAME PHYS/QHP 5/>YRS: CPT | Performed by: INTERNAL MEDICINE

## 2021-08-15 PROCEDURE — 25010000002 FENTANYL CITRATE (PF) 100 MCG/2ML SOLUTION: Performed by: INTERNAL MEDICINE

## 2021-08-15 PROCEDURE — C1894 INTRO/SHEATH, NON-LASER: HCPCS | Performed by: INTERNAL MEDICINE

## 2021-08-15 PROCEDURE — 85025 COMPLETE CBC W/AUTO DIFF WBC: CPT | Performed by: NURSE PRACTITIONER

## 2021-08-15 PROCEDURE — 25010000002 MIDAZOLAM PER 1 MG: Performed by: INTERNAL MEDICINE

## 2021-08-15 PROCEDURE — C1769 GUIDE WIRE: HCPCS | Performed by: INTERNAL MEDICINE

## 2021-08-15 PROCEDURE — 25010000002 HEPARIN (PORCINE) PER 1000 UNITS: Performed by: INTERNAL MEDICINE

## 2021-08-15 PROCEDURE — 99153 MOD SED SAME PHYS/QHP EA: CPT | Performed by: INTERNAL MEDICINE

## 2021-08-15 PROCEDURE — 93306 TTE W/DOPPLER COMPLETE: CPT | Performed by: INTERNAL MEDICINE

## 2021-08-15 PROCEDURE — 99232 SBSQ HOSP IP/OBS MODERATE 35: CPT | Performed by: HOSPITALIST

## 2021-08-15 PROCEDURE — 84484 ASSAY OF TROPONIN QUANT: CPT | Performed by: NURSE PRACTITIONER

## 2021-08-15 PROCEDURE — 85730 THROMBOPLASTIN TIME PARTIAL: CPT | Performed by: HOSPITALIST

## 2021-08-15 PROCEDURE — C1760 CLOSURE DEV, VASC: HCPCS | Performed by: INTERNAL MEDICINE

## 2021-08-15 PROCEDURE — B2151ZZ FLUOROSCOPY OF LEFT HEART USING LOW OSMOLAR CONTRAST: ICD-10-PCS | Performed by: INTERNAL MEDICINE

## 2021-08-15 PROCEDURE — C1887 CATHETER, GUIDING: HCPCS | Performed by: INTERNAL MEDICINE

## 2021-08-15 DEVICE — XIENCE SIERRA™ EVEROLIMUS ELUTING CORONARY STENT SYSTEM 3.00 MM X 28 MM / RAPID-EXCHANGE
Type: IMPLANTABLE DEVICE | Status: FUNCTIONAL
Brand: XIENCE SIERRA™

## 2021-08-15 DEVICE — XIENCE SIERRA™ EVEROLIMUS ELUTING CORONARY STENT SYSTEM 2.50 MM X 28 MM / RAPID-EXCHANGE
Type: IMPLANTABLE DEVICE | Status: FUNCTIONAL
Brand: XIENCE SIERRA™

## 2021-08-15 RX ORDER — HEPARIN SODIUM 1000 [USP'U]/ML
INJECTION, SOLUTION INTRAVENOUS; SUBCUTANEOUS AS NEEDED
Status: DISCONTINUED | OUTPATIENT
Start: 2021-08-15 | End: 2021-08-15 | Stop reason: HOSPADM

## 2021-08-15 RX ORDER — LIDOCAINE HYDROCHLORIDE 20 MG/ML
INJECTION, SOLUTION INFILTRATION; PERINEURAL AS NEEDED
Status: DISCONTINUED | OUTPATIENT
Start: 2021-08-15 | End: 2021-08-15 | Stop reason: HOSPADM

## 2021-08-15 RX ORDER — ACETAMINOPHEN 325 MG/1
650 TABLET ORAL EVERY 4 HOURS PRN
Status: DISCONTINUED | OUTPATIENT
Start: 2021-08-15 | End: 2021-08-15 | Stop reason: SDUPTHER

## 2021-08-15 RX ORDER — NITROGLYCERIN 5 MG/ML
INJECTION, SOLUTION INTRAVENOUS AS NEEDED
Status: DISCONTINUED | OUTPATIENT
Start: 2021-08-15 | End: 2021-08-15 | Stop reason: HOSPADM

## 2021-08-15 RX ORDER — ATORVASTATIN CALCIUM 40 MG/1
40 TABLET, FILM COATED ORAL NIGHTLY
Status: DISCONTINUED | OUTPATIENT
Start: 2021-08-15 | End: 2021-08-17 | Stop reason: HOSPADM

## 2021-08-15 RX ORDER — MIDAZOLAM HYDROCHLORIDE 1 MG/ML
INJECTION INTRAMUSCULAR; INTRAVENOUS AS NEEDED
Status: DISCONTINUED | OUTPATIENT
Start: 2021-08-15 | End: 2021-08-15 | Stop reason: HOSPADM

## 2021-08-15 RX ORDER — SODIUM CHLORIDE 9 MG/ML
3 INJECTION, SOLUTION INTRAVENOUS CONTINUOUS
Status: DISPENSED | OUTPATIENT
Start: 2021-08-15 | End: 2021-08-15

## 2021-08-15 RX ORDER — FENTANYL CITRATE 50 UG/ML
INJECTION, SOLUTION INTRAMUSCULAR; INTRAVENOUS AS NEEDED
Status: DISCONTINUED | OUTPATIENT
Start: 2021-08-15 | End: 2021-08-15 | Stop reason: HOSPADM

## 2021-08-15 RX ADMIN — TICAGRELOR 90 MG: 90 TABLET ORAL at 20:12

## 2021-08-15 RX ADMIN — ATORVASTATIN CALCIUM 40 MG: 40 TABLET, FILM COATED ORAL at 20:12

## 2021-08-15 RX ADMIN — Medication 10 ML: at 13:45

## 2021-08-15 RX ADMIN — MAGNESIUM SULFATE IN DEXTROSE 1 G: 10 INJECTION, SOLUTION INTRAVENOUS at 04:33

## 2021-08-15 RX ADMIN — SODIUM CHLORIDE 3 ML/KG/HR: 0.9 INJECTION, SOLUTION INTRAVENOUS at 13:46

## 2021-08-15 RX ADMIN — Medication 10 ML: at 20:12

## 2021-08-15 NOTE — NURSING NOTE
Spoke with Dr. Patel regarding 6 hour troponin of 2.11; pt asymptomatic. Denies shortness of breath and angina. Informed physician that patient also had an 8-beat run of V-Tach this morning as well. Potassium and Magnesium levels reviewed with MD, told to give 1G of magnesium. In regard to troponin, no new orders. Pt to await heart catheterization this morning with Dr. Cruz.    Respectfully,   Jones Chowdary RN

## 2021-08-15 NOTE — H&P
[facility] Medicine Services      Patient Name: Gil Garcia  : 1955  MRN: [id number]  Primary Care Physician:  Flavia Duggna APRN  Date of admission: 2021      Subjective      Chief Complaint: Chest pain    History of Present Illness: Gil Garcia is a 66 y.o. male who presented to [facility] on 2021 complaining of midsternal chest pain radiating to back and both arms.  Patient rates pain 8-9 on sliding scale 0-10.  Patient describes pain as sharp, constant.  Accompanying symptoms of shortness of air, diaphoresis, dizziness.  Patient denies nausea, vomiting, fever, chills.  Patient stated he was getting up off the couch, felt weak and fell to the floor.  Patient did not hit head.  Denied loss of consciousness.  Patient was assisted up from the floor by EMT, he was down less than 10 minutes.    In the ED CT chest PE protocol showed no evidence of pulmonary embolism. Chest x-ray showed no acute cardiopulmonary process. EKG showed sinus bradycardia. All labs unremarkable except troponin trending up from less than 0.01-0.028, D-dimer 0.75, glucose 148, CO2 20, PTT 21.8. All vital signs unremarkable. In the ED patient given aspirin, Plavix, heparin bolus and drip, IV fluid bolus. Cardiology consulted. Patient admitted to the hospitalist group for further evaluation and treatment.  Review of Systems   Constitutional: Negative. Negative for chills and fever.   HENT: Negative.    Eyes: Negative.    Cardiovascular: Positive for chest pain.   Respiratory: Positive for shortness of breath. Negative for cough.    Endocrine: Negative.    Skin: Negative.    Musculoskeletal: Positive for back pain.   Gastrointestinal: Negative.  Negative for nausea and vomiting.   Genitourinary: Negative.    Neurological: Negative.    Psychiatric/Behavioral: Negative.    Allergic/Immunologic: Negative.        Personal History     Past Medical History:   Diagnosis Date   • Kidney stone         History reviewed. No pertinent surgical history.    Family History: family history includes Heart attack in his father; Other in his brother, mother, and sister. Otherwise pertinent FHx was reviewed and not pertinent to current issue.    Social History:  reports that he has been smoking. He has been smoking about 0.25 packs per day. He has never used smokeless tobacco. He reports previous alcohol use. He reports previous drug use.    Home Medications:  Prior to Admission Medications     None            Allergies:  No Known Allergies    Objective      Vitals:   Temp:  [97.5 °F (36.4 °C)] 97.5 °F (36.4 °C)  Heart Rate:  [55-71] 71  Resp:  [18-20] 18  BP: (101-144)/(57-91) 134/86    Physical Exam  Vitals and nursing note reviewed.   Constitutional:       Appearance: Normal appearance.   HENT:      Head: Normocephalic.      Nose: Nose normal.      Mouth/Throat:      Pharynx: Oropharynx is clear.   Eyes:      Extraocular Movements: Extraocular movements intact.   Cardiovascular:      Rate and Rhythm: Normal rate and regular rhythm.      Pulses: Normal pulses.      Heart sounds: Normal heart sounds.   Pulmonary:      Effort: Pulmonary effort is normal.      Breath sounds: Normal breath sounds.   Abdominal:      General: Bowel sounds are normal.      Palpations: Abdomen is soft.   Musculoskeletal:         General: Normal range of motion.      Cervical back: Normal range of motion.   Skin:     General: Skin is warm and dry.   Neurological:      Mental Status: He is alert and oriented to person, place, and time.   Psychiatric:         Mood and Affect: Mood normal.         Behavior: Behavior normal.        Result Review    Result Review:  I have personally reviewed the results from the time of this admission to 8/15/2021 02:28 EDT and agree with these findings:  [x]  Laboratory  []  Microbiology  [x]  Radiology  [x]  EKG/Telemetry   []  Cardiology/Vascular   []  Pathology  []  Old records  []  Other:  Most notable  findings include: as above      Assessment/Plan        Active Hospital Problems:  Active Hospital Problems    Diagnosis    • **Unstable angina (CMS/[facility])      Plan:     Unstable angina  -Heparin drip started in ER, continue  -Cardiology consulted  -Heart cath in a.m.  -EKG and troponin in a.m.  -N.p.o. for heart cath in the morning  -TTE ordered  -Continuous cardiac monitoring and continuous pulse ox  -Lipid panel, hemoglobin A1c ordered  -Plavix, aspirin given in ED, continue  -Start patient on ACE/ARB and beta-blocker  -Fall precautions  -Daily CBC and BMP        DVT prophylaxis:  Medical DVT prophylaxis orders are present.    CODE STATUS:    Level Of Support Discussed With: Patient  Code Status: CPR  Medical Interventions (Level of Support Prior to Arrest): Full    Admission Status:  I believe this patient meets observation status.    I discussed the patient's findings and my recommendations with patient.    This patient has been examined wearing appropriate Personal Protective Equipment. 08/15/21      Signature: Electronically signed by JIGNA Tay, 08/15/21, 6:33 AM EDT.

## 2021-08-15 NOTE — PLAN OF CARE
Goal Outcome Evaluation:  Plan of Care Reviewed With: patient   Pt arrived to [facility] this morning at approximately 0130. Remained afebrile with vital signs stable. Alert and oriented x4. On room air. Denies shortness of breath/ angina at present. Currently running sinus bradycardia on the monitor with rate of 58. Critical 6 hour troponin of 2.11, spoke with  regarding said matter. Plan is to await heart catherization for today, as pt remains asymptomatic. Will continue to monitor pt closely.      Respectfully,   Jones Chowdary RN      Problem: Adult Inpatient Plan of Care  Goal: Absence of Hospital-Acquired Illness or Injury  Intervention: Prevent Infection  Description: Maintain skin and mucous membrane integrity; promote hand, oral and pulmonary hygiene.  Optimize fluid balance, nutrition, sleep and glycemic control to maximize infection resistance.  Identify potential sources of infection early to prevent or mitigate progression of infection (e.g., wound, lines, devices).  Evaluate ongoing need for invasive devices; remove promptly when no longer indicated.  Recent Flowsheet Documentation  Taken 8/15/2021 0144 by Jones Chowdary, RN  Infection Prevention:   hand hygiene promoted   rest/sleep promoted   single patient room provided     Problem: Adult Inpatient Plan of Care  Goal: Absence of Hospital-Acquired Illness or Injury  Intervention: Prevent Skin Injury  Description: Assess skin risk on admission and at regular intervals throughout hospital stay.  Keep all areas of skin (especially folds) clean and dry.  Maintain adequate skin hydration.  Relieve and redistribute pressure and protect bony prominences; implement measures based on patient-specific risk factors.  Match turning and repositioning schedule to clinical condition.  Encourage weight shift frequently; assist with reposition if unable to complete independently.  Float heels off bed. Avoid pressure on the Achilles tendon.  Keep skin free from extended  contact with medical devices.  Use aids (e.g., slide boards, mechanical lift) during transfer.  Recent Flowsheet Documentation  Taken 8/15/2021 0144 by Jones Chowdary RN  Body Position: position changed independently  Skin Protection:   adhesive use limited   tubing/devices free from skin contact

## 2021-08-15 NOTE — PROGRESS NOTES
[facility] Medicine Services Daily Progress Note    Patient Name: Gil Garcia  : 1955  MRN: [id number]  Primary Care Physician:  Flavia Duggan, JIGNA  Date of admission: 2021      Subjective      Chief Complaint: chest pain    HPI  Patient denies for any chest pain, no nausea or vomiting, no nausea or vomiting.     Review of Systems   All other systems reviewed and are negative.         Objective      Vitals:   Temp:  [97.2 °F (36.2 °C)-97.6 °F (36.4 °C)] 97.2 °F (36.2 °C)  Heart Rate:  [55-71] 67  Resp:  [12-20] 16  BP: (101-144)/() 116/77    Physical Exam  Vitals and nursing note reviewed.   Constitutional:       General: He is not in acute distress.     Appearance: Normal appearance. He is well-developed. He is not ill-appearing, toxic-appearing or diaphoretic.   HENT:      Head: Normocephalic and atraumatic.      Right Ear: Ear canal and external ear normal.      Left Ear: Ear canal and external ear normal.      Nose: Nose normal. No congestion or rhinorrhea.      Mouth/Throat:      Mouth: Mucous membranes are moist.      Pharynx: No oropharyngeal exudate.   Eyes:      General: No scleral icterus.        Right eye: No discharge.         Left eye: No discharge.      Extraocular Movements: Extraocular movements intact.      Conjunctiva/sclera: Conjunctivae normal.      Pupils: Pupils are equal, round, and reactive to light.   Neck:      Thyroid: No thyromegaly.      Vascular: No carotid bruit or JVD.      Trachea: No tracheal deviation.   Cardiovascular:      Rate and Rhythm: Normal rate and regular rhythm.      Pulses: Normal pulses.      Heart sounds: Normal heart sounds. No murmur heard.   No friction rub. No gallop.    Pulmonary:      Effort: Pulmonary effort is normal. No respiratory distress.      Breath sounds: Normal breath sounds. No stridor. No wheezing, rhonchi or rales.   Chest:      Chest wall: No tenderness.   Abdominal:      General: Bowel sounds are  normal. There is no distension.      Palpations: Abdomen is soft. There is no mass.      Tenderness: There is no abdominal tenderness. There is no guarding or rebound.      Hernia: No hernia is present.   Musculoskeletal:         General: No swelling, tenderness, deformity or signs of injury. Normal range of motion.      Cervical back: Normal range of motion and neck supple. No rigidity. No muscular tenderness.      Right lower leg: No edema.      Left lower leg: No edema.   Lymphadenopathy:      Cervical: No cervical adenopathy.   Skin:     General: Skin is warm and dry.      Coloration: Skin is not jaundiced or pale.      Findings: No bruising, erythema or rash.   Neurological:      General: No focal deficit present.      Mental Status: He is alert and oriented to person, place, and time. Mental status is at baseline.      Cranial Nerves: No cranial nerve deficit.      Sensory: No sensory deficit.      Motor: No weakness or abnormal muscle tone.      Coordination: Coordination normal.   Psychiatric:         Mood and Affect: Mood normal.         Behavior: Behavior normal.         Thought Content: Thought content normal.         Judgment: Judgment normal.             Result Review    Result Review:  I have personally reviewed the results from the time of this admission to 8/15/2021 14:47 EDT and agree with these findings:  [x]  Laboratory  [x]  Microbiology  []  Radiology  []  EKG/Telemetry   []  Cardiology/Vascular   []  Pathology  []  Old records  []  Other:  Most notable findings include:         Assessment/Plan      Brief Patient Summary:  Gil Garcia is a 66 y.o. male who     aspirin, 324 mg, Oral, Once  aspirin, 81 mg, Oral, Daily  atorvastatin, 40 mg, Oral, Nightly  sodium chloride, 10 mL, Intravenous, Q12H  ticagrelor, 90 mg, Oral, BID       heparin, 9.8 Units/kg/hr, Last Rate: 12.8 Units/kg/hr (08/15/21 0563)  sodium chloride, 3 mL/kg/hr, Last Rate: 3 mL/kg/hr (08/15/21 8156)         Active Hospital  Problems:  Active Hospital Problems    Diagnosis    • **Unstable angina (CMS/[facility])      A/P    Unstable angina / NSTEMI   -Heparin drip started in ER, continue  -Cardiology consulted  - Status post Cardiac cath with stent to circumflex   -Continuous cardiac monitoring and continuous pulse ox  -Lipid panel, awaited hemoglobin A1c  - on ASA and brilinta and statin, consider starting betablocker if ok with cardiology service.  -Fall precautions    Dyslipidemia ... started on statin.            DVT prophylaxis:  Medical DVT prophylaxis orders are present.     CODE STATUS:    Level Of Support Discussed With: Patient  Code Status: CPR  Medical Interventions (Level of Support Prior to Arrest): Full         DVT prophylaxis:  Medical DVT prophylaxis orders are present.    CODE STATUS:    Level Of Support Discussed With: Patient  Code Status: CPR  Medical Interventions (Level of Support Prior to Arrest): Full      Disposition:  I expect patient to be discharged tomorrow if cleared by cardiology.    This patient has been examined wearing appropriate Personal Protective Equipment and discussed with hospital infection control department. 08/15/21      Electronically signed by Cristy Corbin MD, 08/15/21, 14:47 EDT.  [facility] Hospitalist Team

## 2021-08-15 NOTE — CONSULTS
Cardiology consult note  Miky Cruz MD, PhD      Patient Care Team:  Flavia Duggan APRN as PCP - General (Nurse Practitioner)    CHIEF COMPLAINT: Chest pain concerning for acute MI    HISTORY OF PRESENT ILLNESS:    This is a 66-year-old gentleman with no previous cardiac history, family history of CAD in his father, previous long-term and current smoker who presented to the ER with bilateral arm pain shoulder pain chest pain appearing diaphoretic dizzy who called EMS, EKG revealed very subtle abnormalities in the inferior leads but by the time the patient reached the hospital he overall was much improved with some residual left arm pain, troponin was initially unremarkable, patient was already ruled out for PE and aortic dissection with CTA of the chest and therefore was started on heparin in addition to empiric loading with P2 Y 12 inhibitor with Plavix.  I saw the patient he still had some left arm pain but EKG again did not reveal any ST elevation or depression significantly.  He says he has some musculoskeletal pain on the right which is always present.  No other fevers chill sweats nausea vomiting or diarrhea    Review of systems negative x14 point review of systems except was mentioned above    Historical data copied forward from previous encounters in EMR is unchanged    Risk of left her catheterization clubbing death heart attack stroke pain bleeding infection as well as others was discussed with the patient he wishes to proceed with left her catheterization ultimately    Risk and benefits of cardiac medicines with necessity of each discussed including P2 Y 12 inhibitors and blood thinners          Past Medical History:   Diagnosis Date   • Kidney stone      History reviewed. No pertinent surgical history.  Family History   Problem Relation Age of Onset   • Other Mother    • Other Sister    • Other Brother    • Heart attack Father      Social History     Tobacco Use   • Smoking status: Current  "Every Day Smoker     Packs/day: 0.25   • Smokeless tobacco: Never Used   Substance Use Topics   • Alcohol use: Not Currently   • Drug use: Not Currently     No medications prior to admission.     Allergies:  Patient has no known allergies.    REVIEW OF SYSTEMS:  Please see the above history of present illness for pertinent positives and negatives.  The remainder of the patient's systems have been reviewed and are negative.    Vital Signs  Temp:  [97.3 °F (36.3 °C)-97.6 °F (36.4 °C)] 97.6 °F (36.4 °C)  Heart Rate:  [55-71] 59  Resp:  [12-20] 16  BP: (101-144)/(57-91) 125/79    Flowsheet Rows      First Filed Value   Admission Height  190.5 cm (75\") Documented at 08/14/2021 1555   Admission Weight  102 kg (225 lb) Documented at 08/14/2021 1555           Physical Exam:  Physical Exam   Constitutional: Patient appears well-developed and well-nourished and in no acute distress   HEENT:   Head: Normocephalic and atraumatic.   Eyes:  Pupils are equal, round, and reactive to light. EOM are intact. Sclerae are anicteric and noninjected.  Mouth and Throat: Patient has moist mucous membranes. Oropharynx is clear of any erythema or exudate.     Neck: Neck supple. No JVD present. No thyromegaly present. No lymphadenopathy present.  Cardiovascular: Regular rate, regular rhythm, S1 normal and S2 normal.  Exam reveals no gallop and no friction rub.  No murmur heard.  Pulmonary/Chest: Lungs are clear to auscultation bilaterally. No respiratory distress. No wheezes. No rhonchi. No rales.   Abdominal: Soft. Bowel sounds are normal. No distension and no mass. There is no hepatosplenomegaly. There is no tenderness.   Musculoskeletal: Normal muscle tone  Extremities: No edema. Pulses are palpable in all 4 extremities.  Neurological: Patient is alert and oriented to person, place, and time. Cranial nerves II-XII are grossly intact with no focal deficits.  Skin: Skin is warm. No rash noted. Nails show no clubbing.  No cyanosis or " erythema.     Results Review:    I reviewed the patient's new clinical results.  Lab Results (most recent)     Procedure Component Value Units Date/Time    POC Activated Clotting Time [[id number]]  (Abnormal) Collected: 08/15/21 1020    Specimen: Arterial Blood Updated: 08/15/21 1028     Activated Clotting Time  246 Seconds      Comment: Serial Number: [id number]:  [id number]       POC Activated Clotting Time [[id number]]  (Abnormal) Collected: 08/15/21 0958    Specimen: Arterial Blood Updated: 08/15/21 1028     Activated Clotting Time  219 Seconds      Comment: Serial Number: [id number]:  [id number]       aPTT [[id number]]  (Abnormal) Collected: 08/15/21 0824    Specimen: Blood Updated: 08/15/21 0845     PTT 47.3 seconds     POC Glucose Once [[id number]]  (Abnormal) Collected: 08/15/21 0805    Specimen: Blood Updated: 08/15/21 0806     Glucose 114 mg/dL      Comment: Serial Number: [id number]:  [id number]       Troponin [[id number]]  (Abnormal) Collected: 08/15/21 0227    Specimen: Blood Updated: 08/15/21 0256     Troponin T 2.110 ng/mL     Narrative:      Troponin T Reference Range:  <= 0.03 ng/mL-   Negative for AMI  >0.03 ng/mL-     Abnormal for myocardial necrosis.  Clinicians would have to utilize clinical acumen, EKG, Troponin and serial changes to determine if it is an Acute Myocardial Infarction or myocardial injury due to an underlying chronic condition.       Results may be falsely decreased if patient taking Biotin.      Basic Metabolic Panel [[id number]]  (Abnormal) Collected: 08/15/21 0227    Specimen: Blood Updated: 08/15/21 0252     Glucose 131 mg/dL      BUN 18 mg/dL      Creatinine 0.78 mg/dL      Sodium 138 mmol/L      Potassium 4.4 mmol/L      Chloride 105 mmol/L      CO2 26.0 mmol/L      Calcium 9.1 mg/dL      eGFR Non African Amer 100 mL/min/1.73      BUN/Creatinine Ratio 23.1     Anion Gap 7.0 mmol/L     Narrative:      GFR Normal >60  Chronic Kidney Disease <60  Kidney Failure <15       Magnesium [[id number]]  (Normal) Collected: 08/15/21 0227    Specimen: Blood Updated: 08/15/21 0252     Magnesium 1.9 mg/dL     aPTT [[id number]]  (Abnormal) Collected: 08/15/21 0227    Specimen: Blood Updated: 08/15/21 0247     PTT 29.7 seconds     CBC & Differential [[id number]]  (Abnormal) Collected: 08/15/21 0227    Specimen: Blood Updated: 08/15/21 0233    Narrative:      The following orders were created for panel order CBC & Differential.  Procedure                               Abnormality         Status                     ---------                               -----------         ------                     CBC Auto Differential[[id number]]        Abnormal            Final result                 Please view results for these tests on the individual orders.    CBC Auto Differential [[id number]]  (Abnormal) Collected: 08/15/21 0227    Specimen: Blood Updated: 08/15/21 0233     WBC 10.40 10*3/mm3      RBC 5.45 10*6/mm3      Hemoglobin 15.4 g/dL      Hematocrit 46.1 %      MCV 84.6 fL      MCH 28.2 pg      MCHC 33.3 g/dL      RDW 14.6 %      RDW-SD 43.3 fl      MPV 8.2 fL      Platelets 192 10*3/mm3      Neutrophil % 75.6 %      Lymphocyte % 17.8 %      Monocyte % 5.4 %      Eosinophil % 0.5 %      Basophil % 0.7 %      Neutrophils, Absolute 7.90 10*3/mm3      Lymphocytes, Absolute 1.90 10*3/mm3      Monocytes, Absolute 0.60 10*3/mm3      Eosinophils, Absolute 0.10 10*3/mm3      Basophils, Absolute 0.10 10*3/mm3      nRBC 0.1 /100 WBC     Lipid Panel [[id number]]  (Abnormal) Collected: 08/14/21 1919    Specimen: Blood Updated: 08/14/21 2125     Total Cholesterol 207 mg/dL      Triglycerides 106 mg/dL      HDL Cholesterol 38 mg/dL      LDL Cholesterol  150 mg/dL      VLDL Cholesterol 19 mg/dL      LDL/HDL Ratio 3.89    Narrative:      Cholesterol Reference Ranges  (U.S. Department of Health and Human Services ATP III Classifications)    Desirable          <200 mg/dL  Borderline High    200-239 mg/dL  High Risk           >240 mg/dL      Triglyceride Reference Ranges  (U.S. Department of Health and Human Services ATP III Classifications)    Normal           <150 mg/dL  Borderline High  150-199 mg/dL  High             200-499 mg/dL  Very High        >500 mg/dL    HDL Reference Ranges  (U.S. Department of Health and Human Services ATP III Classifcations)    Low     <40 mg/dl (major risk factor for CHD)  High    >60 mg/dl ('negative' risk factor for CHD)        LDL Reference Ranges  (U.S. Department of Health and Human Services ATP III Classifcations)    Optimal          <100 mg/dL  Near Optimal     100-129 mg/dL  Borderline High  130-159 mg/dL  High             160-189 mg/dL  Very High        >189 mg/dL    Hemoglobin A1c [[id number]] Collected: 08/14/21 1613    Specimen: Blood Updated: 08/14/21 2115    Troponin [[id number]]  (Normal) Collected: 08/14/21 1919    Specimen: Blood Updated: 08/14/21 1945     Troponin T 0.028 ng/mL     Narrative:      Troponin T Reference Range:  <= 0.03 ng/mL-   Negative for AMI  >0.03 ng/mL-     Abnormal for myocardial necrosis.  Clinicians would have to utilize clinical acumen, EKG, Troponin and serial changes to determine if it is an Acute Myocardial Infarction or myocardial injury due to an underlying chronic condition.       Results may be falsely decreased if patient taking Biotin.      Extra Tubes [[id number]] Collected: 08/14/21 1615    Specimen: Blood, Venous Line Updated: 08/14/21 1705    Narrative:      The following orders were created for panel order Extra Tubes.  Procedure                               Abnormality         Status                     ---------                               -----------         ------                     Gold Top - SST[[id number]]                                   Final result                 Please view results for these tests on the individual orders.    Gold Top - SST [[id number]] Collected: 08/14/21 1615    Specimen: Blood Updated: 08/14/21 1705    D-dimer,  Quantitative [[id number]]  (Abnormal) Collected: 08/14/21 1613    Specimen: Blood Updated: 08/14/21 1658     D-Dimer, Quantitative 0.75 mg/L (FEU)     Narrative:      Reference Range  --------------------------------------------------------------------     < 0.50   Negative Predictive Value  0.50-0.59   Indeterminate    >= 0.60   Probable VTE             A very low percentage of patients with DVT may yield D-Dimer results   below the cut-off of 0.50 mg/L FEU.  This is known to be more   prevalent in patients with distal DVT.             Results of this test should always be interpreted in conjunction with   the patient's medical history, clinical presentation and other   findings.  Clinical diagnosis should not be based on the result of   [place] D-Dimer alone.    Comprehensive Metabolic Panel [[id number]]  (Abnormal) Collected: 08/14/21 1613    Specimen: Blood Updated: 08/14/21 1648     Glucose 148 mg/dL      BUN 21 mg/dL      Creatinine 0.92 mg/dL      Sodium 139 mmol/L      Potassium 3.6 mmol/L      Chloride 104 mmol/L      CO2 20.0 mmol/L      Calcium 9.2 mg/dL      Total Protein 7.1 g/dL      Albumin 4.00 g/dL      ALT (SGPT) 36 U/L      AST (SGOT) 25 U/L      Alkaline Phosphatase 74 U/L      Total Bilirubin 0.4 mg/dL      eGFR Non African Amer 82 mL/min/1.73      Globulin 3.1 gm/dL      A/G Ratio 1.3 g/dL      BUN/Creatinine Ratio 22.8     Anion Gap 15.0 mmol/L     Narrative:      GFR Normal >60  Chronic Kidney Disease <60  Kidney Failure <15      COVID PRE-OP / PRE-PROCEDURE SCREENING ORDER (NO ISOLATION) - Swab, Nasopharynx [[id number]]  (Normal) Collected: 08/14/21 1616    Specimen: Swab from Nasopharynx Updated: 08/14/21 1639    Narrative:      The following orders were created for panel order COVID PRE-OP / PRE-PROCEDURE SCREENING ORDER (NO ISOLATION) - Swab, Nasopharynx.  Procedure                               Abnormality         Status                     ---------                                -----------         ------                     COVID-19,CEPHEID/DANE/BD...[[id number]]  Normal              Final result                 Please view results for these tests on the individual orders.    COVID-19,CEPHEID/DANE/BDMAX,COR/[facility]/PAD/[facility] IN-HOUSE(OR EMERGENT/ADD-ON),NP SWAB IN TRANSPORT MEDIA 3-4 HR TAT, RT-PCR - Swab, Nasopharynx [[id number]]  (Normal) Collected: 08/14/21 1616    Specimen: Swab from Nasopharynx Updated: 08/14/21 1639     COVID19 Not Detected    Narrative:      Fact sheet for providers: https://www.fda.gov/media/738835/download     Fact sheet for patients: https://www.fda.gov/media/258702/download  Fact sheet for providers: https://www.fda.gov/media/529554/download    Fact sheet for patients: https://www.fda.gov/media/299721/download    Test performed by PCR.    Protime-INR [[id number]]  (Normal) Collected: 08/14/21 1613    Specimen: Blood Updated: 08/14/21 1631     Protime 10.8 Seconds      INR 0.97    CBC & Differential [[id number]]  (Abnormal) Collected: 08/14/21 1613    Specimen: Blood Updated: 08/14/21 1621    Narrative:      The following orders were created for panel order CBC & Differential.  Procedure                               Abnormality         Status                     ---------                               -----------         ------                     CBC Auto Differential[[id number]]        Abnormal            Final result                 Please view results for these tests on the individual orders.    CBC Auto Differential [[id number]]  (Abnormal) Collected: 08/14/21 1613    Specimen: Blood Updated: 08/14/21 1621     WBC 9.80 10*3/mm3      RBC 5.78 10*6/mm3      Hemoglobin 16.3 g/dL      Hematocrit 48.6 %      MCV 84.1 fL      MCH 28.2 pg      MCHC 33.6 g/dL      RDW 14.9 %      RDW-SD 44.2 fl      MPV 8.5 fL      Platelets 266 10*3/mm3      Neutrophil % 54.4 %      Lymphocyte % 35.8 %      Monocyte % 5.9 %      Eosinophil % 2.6 %      Basophil % 1.3 %      Neutrophils,  Absolute 5.30 10*3/mm3      Lymphocytes, Absolute 3.50 10*3/mm3      Monocytes, Absolute 0.60 10*3/mm3      Eosinophils, Absolute 0.30 10*3/mm3      Basophils, Absolute 0.10 10*3/mm3      nRBC 0.1 /100 WBC           Imaging Results (Most Recent)     Procedure Component Value Units Date/Time    CT Chest Pulmonary Embolism [[id number]] Collected: 08/14/21 1737     Updated: 08/14/21 1751    Narrative:      CT CHEST PULMONARY EMBOLISM-     Date of Exam: 8/14/2021 5:26 PM     Indication: chest pain/back pain.     Comparison: Radiograph 08/14/2021     Technique: Serial and axial CT images of the chest were obtained  following the uneventful intravenous administration of 100 cc of  Isovue-370 contrast. Reconstructions in the coronal and sagittal planes  were also performed.  Automated exposure control and iterative  reconstruction methods were used.     FINDINGS:     Pulmonary Arteries: Excellent contrast opacification of the pulmonary  arteries.  No intraluminal filling defects to suggest pulmonary embolus.   The pulmonary arteries are normal in caliber.     Hilum and Mediastinum: No pathologically enlarged lymph nodes.  Normal  heart size.  No significant coronary artery atherosclerotic disease.  Unremarkable thoracic aorta.   No pericardial effusion.     Lung Parenchyma and Pleura: No focal consolidations.  No suspicious  pulmonary nodules.  No endobronchial lesions.  No significant pleural  effusions.     Upper Abdomen: Unremarkable.     Soft tissues: Unremarkable.     Osseous structures: No aggressive focal lytic or sclerotic osseous  lesions.       Impression:         1. No evidence of pulmonary embolus.  2. No acute cardiopulmonary process.  3. Ancillary findings as described above.              Electronically Signed By-Irina Freeman MD On:8/14/2021 5:39 PM  This report was finalized on [id number] by  Irian Freeman MD.    XR Chest 1 View [[id number]] Collected: 08/14/21 1652     Updated: 08/14/21 1654    Narrative:       Examination: XR CHEST 1 VW-     Date of Exam: 8/14/2021 4:30 PM     Indication: chest pain.     Comparison: None available.     Technique: 1 view of the chest      Findings:  There are no airspace consolidations. No pleural effusions. No  pneumothorax. The heart size is normal. The pulmonary vasculature  appears within normal limits. No acute osseous abnormality identified.       Impression:      No acute cardiopulmonary process.     Electronically Signed By-Irina Freeman MD On:8/14/2021 4:52 PM  This report was finalized on [id number] by  Irina Freeman MD.        reviewed    ECG/EMG Results (most recent)     Procedure Component Value Units Date/Time    ECG 12 Lead [[id number]] Collected: 08/14/21 1556     Updated: 08/15/21 0652     QT Interval 426 ms     Narrative:      HEART RATE= 59  bpm  RR Interval= 1012  ms  MS Interval= 166  ms  P Horizontal Axis= 16  deg  P Front Axis= 52  deg  QRSD Interval= 94  ms  QT Interval= 426  ms  QRS Axis= 10  deg  T Wave Axis= 50  deg  - ABNORMAL ECG -  Sinus bradycardia  ST elevation, consider inferior injury  No previous ECG available for comparison  Electronically Signed By: Elie Melo ([facility]) 15-Aug-2021 06:52:17  Date and Time of Study: 2021-08-14 15:56:21    Adult Transthoracic Echo Complete W/ Cont if Necessary Per Protocol [[id number]] Collected: 08/15/21 0751     Updated: 08/15/21 0910     BSA 2.3 m^2      RVIDd 1.7 cm      IVSd 0.9 cm      LVIDd 5.7 cm      LVIDs 3.6 cm      LVPWd 1.1 cm      IVS/LVPW 0.82     FS 36.8 %      EDV(Teich) 162.4 ml      ESV(Teich) 55.3 ml      EF(Teich) 66.0 %      EDV(cubed) 188.7 ml      ESV(cubed) 47.6 ml      EF(cubed) 74.8 %      LV mass(C)d 226.3 grams      LV mass(C)dI 100.0 grams/m^2      SV(Teich) 107.1 ml      SI(Teich) 47.3 ml/m^2      SV(cubed) 141.2 ml      SI(cubed) 62.4 ml/m^2      Ao root diam 3.7 cm      Ao root area 10.7 cm^2      ACS 1.9 cm      asc Aorta Diam 3.5 cm      LVOT diam 2.3 cm      LVOT area 4.1 cm^2       EDV(MOD-sp4) 97.1 ml      ESV(MOD-sp4) 43.4 ml      EF(MOD-sp4) 55.2 %      SV(MOD-sp4) 53.6 ml      SI(MOD-sp4) 23.7 ml/m^2      Ao root area (BSA corrected) 1.6     LV Petty Vol (BSA corrected) 42.9 ml/m^2      LV Sys Vol (BSA corrected) 19.2 ml/m^2      Aortic R-R 1.0 sec      Aortic HR 57.7 BPM      MV E max deneen 51.6 cm/sec      MV A max deneen 55.8 cm/sec      MV E/A 0.92     MV V2 max 64.3 cm/sec      MV max PG 1.7 mmHg      MV V2 mean 40.3 cm/sec      MV mean PG 0.75 mmHg      MV V2 VTI 22.7 cm      MVA(VTI) 3.5 cm^2      MV dec slope 201.2 cm/sec^2      MV dec time 0.26 sec      Ao pk deneen 96.3 cm/sec      Ao max PG 3.7 mmHg      Ao max PG (full) -0.05 mmHg      Ao V2 mean 64.2 cm/sec      Ao mean PG 1.9 mmHg      Ao mean PG (full) 0.19 mmHg      Ao V2 VTI 18.6 cm      SAMMI(I,A) 4.2 cm^2      SAMMI(I,D) 4.2 cm^2      SAMMI(V,A) 4.1 cm^2      SAMMI(V,D) 4.1 cm^2      LV V1 max PG 3.8 mmHg      LV V1 mean PG 1.7 mmHg      LV V1 max 97.0 cm/sec      LV V1 mean 59.4 cm/sec      LV V1 VTI 19.2 cm      CO(Ao) 11.5 l/min      CI(Ao) 5.1 l/min/m^2      SV(Ao) 199.0 ml      SI(Ao) 88.0 ml/m^2      CO(LVOT) 4.5 l/min      CI(LVOT) 2.0 l/min/m^2      SV(LVOT) 78.3 ml      SI(LVOT) 34.6 ml/m^2      PA V2 max 86.7 cm/sec      PA max PG 3.0 mmHg      PA max PG (full) 1.3 mmHg      PA V2 mean 66.1 cm/sec      PA mean PG 1.9 mmHg      PA mean PG (full) 1.1 mmHg      PA V2 VTI 18.9 cm      PA acc time 0.1 sec      RV V1 max PG 1.7 mmHg      RV V1 mean PG 0.8 mmHg      RV V1 max 65.0 cm/sec      RV V1 mean 41.5 cm/sec      RV V1 VTI 13.7 cm      TR max deneen 178.4 cm/sec      RVSP(TR) 15.7 mmHg      RAP systole 3.0 mmHg      PA pr(Accel) 32.8 mmHg       CV ECHO SAI - BZI_BMI 26.9 kilograms/m^2       CV ECHO SAI - BSA(HAYCOCK) 2.3 m^2       CV ECHO SAI - BZI_METRIC_WEIGHT 97.5 kg       CV ECHO SAI - BZI_METRIC_HEIGHT 190.5 cm      EF(MOD-bp) 55.0 %      LA dimension(2D) 4.4 cm         reviewed  EKG reviewed and interpreted by me  demonstrates sinus rhythm with nonspecific ST-T wave abnormalities in the inferolateral leads      Assessment/Plan     Chest pain concerning for acute MI  Unstable angina  Initial biomarkers negative  Trend biomarkers  Continue heparin drip  Loaded with 600 of Plavix in addition to aspirin  High intensity statin  Fast lipid panel in the morning    Anticipated left heart catheterization in the morning for definitive evaluation with typical symptoms of chest pain concerning for angina        I discussed the patient's findings and my recommendations with patient and staff and wife at bedside    Miky Cruz MD  08/15/21  10:48 EDT

## 2021-08-15 NOTE — PROGRESS NOTES
Cardiology progress note  Miky Cruz MD, PhD      Patient Care Team:  Flavia Duggan APRN as PCP - General (Nurse Practitioner)    CHIEF COMPLAINT: Chest pain concerning for acute MI    HISTORY OF PRESENT ILLNESS:    This is a 66-year-old gentleman with no previous cardiac history, family history of CAD in his father, previous long-term and current smoker who presented to the ER with bilateral arm pain shoulder pain chest pain appearing diaphoretic dizzy who called EMS, EKG revealed very subtle abnormalities in the inferior leads but by the time the patient reached the hospital he overall was much improved with some residual left arm pain, troponin was initially unremarkable, patient was already ruled out for PE and aortic dissection with CTA of the chest and therefore was started on heparin in addition to empiric loading with P2 Y 12 inhibitor with Plavix.  I saw the patient he still had some left arm pain but EKG again did not reveal any ST elevation or depression significantly.  He says he has some musculoskeletal pain on the right which is always present.  No other fevers chill sweats nausea vomiting or diarrhea  =================================================================  Seen and examined, no further left arm pain or chest pain  Did well overnight  Ready for left heart catheterization today for definitive evaluation given typical symptoms  Patient's troponin did become positive now at 2.0 from unremarkable, indicative of non-ST elevation microinfarction    Patient continues heparin, was successfully loaded with Plavix yesterday evening  No bleeding  No contraindications for left heart catheterization, no contrast allergy  Patient wishes to proceed today    Review of systems negative x14 point review of systems except was mentioned above    Historical data copied forward from previous encounters in EMR is unchanged    Risk of left her catheterization clubbing death heart attack stroke pain  "bleeding infection as well as others was discussed with the patient he wishes to proceed with left her catheterization ultimately    Risk and benefits of cardiac medicines with necessity of each discussed including P2 Y 12 inhibitors and blood thinners          Past Medical History:   Diagnosis Date   • Kidney stone      History reviewed. No pertinent surgical history.  Family History   Problem Relation Age of Onset   • Other Mother    • Other Sister    • Other Brother    • Heart attack Father      Social History     Tobacco Use   • Smoking status: Current Every Day Smoker     Packs/day: 0.25   • Smokeless tobacco: Never Used   Substance Use Topics   • Alcohol use: Not Currently   • Drug use: Not Currently     No medications prior to admission.     Allergies:  Patient has no known allergies.    REVIEW OF SYSTEMS:  Please see the above history of present illness for pertinent positives and negatives.  The remainder of the patient's systems have been reviewed and are negative.    Vital Signs  Temp:  [97.3 °F (36.3 °C)-97.6 °F (36.4 °C)] 97.6 °F (36.4 °C)  Heart Rate:  [55-71] 59  Resp:  [12-20] 16  BP: (101-144)/(57-91) 125/79    Flowsheet Rows      First Filed Value   Admission Height  190.5 cm (75\") Documented at 08/14/2021 1555   Admission Weight  102 kg (225 lb) Documented at 08/14/2021 1555           Physical Exam:  Physical Exam   Constitutional: Patient appears well-developed and well-nourished and in no acute distress   HEENT:   Head: Normocephalic and atraumatic.   Eyes:  Pupils are equal, round, and reactive to light. EOM are intact. Sclerae are anicteric and noninjected.  Mouth and Throat: Patient has moist mucous membranes. Oropharynx is clear of any erythema or exudate.     Neck: Neck supple. No JVD present. No thyromegaly present. No lymphadenopathy present.  Cardiovascular: Regular rate, regular rhythm, S1 normal and S2 normal.  Exam reveals no gallop and no friction rub.  No murmur " heard.  Pulmonary/Chest: Lungs are clear to auscultation bilaterally. No respiratory distress. No wheezes. No rhonchi. No rales.   Abdominal: Soft. Bowel sounds are normal. No distension and no mass. There is no hepatosplenomegaly. There is no tenderness.   Musculoskeletal: Normal muscle tone  Extremities: No edema. Pulses are palpable in all 4 extremities.  Neurological: Patient is alert and oriented to person, place, and time. Cranial nerves II-XII are grossly intact with no focal deficits.  Skin: Skin is warm. No rash noted. Nails show no clubbing.  No cyanosis or erythema.  Unchanged from prior encounter     Results Review:    I reviewed the patient's new clinical results.  Lab Results (most recent)     Procedure Component Value Units Date/Time    POC Activated Clotting Time [[id number]]  (Abnormal) Collected: 08/15/21 1020    Specimen: Arterial Blood Updated: 08/15/21 1028     Activated Clotting Time  246 Seconds      Comment: Serial Number: [id number]:  [id number]       POC Activated Clotting Time [[id number]]  (Abnormal) Collected: 08/15/21 0958    Specimen: Arterial Blood Updated: 08/15/21 1028     Activated Clotting Time  219 Seconds      Comment: Serial Number: [id number]:  [id number]       aPTT [[id number]]  (Abnormal) Collected: 08/15/21 0824    Specimen: Blood Updated: 08/15/21 0845     PTT 47.3 seconds     POC Glucose Once [[id number]]  (Abnormal) Collected: 08/15/21 0805    Specimen: Blood Updated: 08/15/21 0806     Glucose 114 mg/dL      Comment: Serial Number: [id number]:  [id number]       Troponin [[id number]]  (Abnormal) Collected: 08/15/21 0227    Specimen: Blood Updated: 08/15/21 0256     Troponin T 2.110 ng/mL     Narrative:      Troponin T Reference Range:  <= 0.03 ng/mL-   Negative for AMI  >0.03 ng/mL-     Abnormal for myocardial necrosis.  Clinicians would have to utilize clinical acumen, EKG, Troponin and serial changes to determine if it is an Acute Myocardial Infarction or myocardial  injury due to an underlying chronic condition.       Results may be falsely decreased if patient taking Biotin.      Basic Metabolic Panel [[id number]]  (Abnormal) Collected: 08/15/21 0227    Specimen: Blood Updated: 08/15/21 0252     Glucose 131 mg/dL      BUN 18 mg/dL      Creatinine 0.78 mg/dL      Sodium 138 mmol/L      Potassium 4.4 mmol/L      Chloride 105 mmol/L      CO2 26.0 mmol/L      Calcium 9.1 mg/dL      eGFR Non African Amer 100 mL/min/1.73      BUN/Creatinine Ratio 23.1     Anion Gap 7.0 mmol/L     Narrative:      GFR Normal >60  Chronic Kidney Disease <60  Kidney Failure <15      Magnesium [[id number]]  (Normal) Collected: 08/15/21 0227    Specimen: Blood Updated: 08/15/21 0252     Magnesium 1.9 mg/dL     aPTT [[id number]]  (Abnormal) Collected: 08/15/21 0227    Specimen: Blood Updated: 08/15/21 0247     PTT 29.7 seconds     CBC & Differential [[id number]]  (Abnormal) Collected: 08/15/21 0227    Specimen: Blood Updated: 08/15/21 0233    Narrative:      The following orders were created for panel order CBC & Differential.  Procedure                               Abnormality         Status                     ---------                               -----------         ------                     CBC Auto Differential[[id number]]        Abnormal            Final result                 Please view results for these tests on the individual orders.    CBC Auto Differential [[id number]]  (Abnormal) Collected: 08/15/21 0227    Specimen: Blood Updated: 08/15/21 0233     WBC 10.40 10*3/mm3      RBC 5.45 10*6/mm3      Hemoglobin 15.4 g/dL      Hematocrit 46.1 %      MCV 84.6 fL      MCH 28.2 pg      MCHC 33.3 g/dL      RDW 14.6 %      RDW-SD 43.3 fl      MPV 8.2 fL      Platelets 192 10*3/mm3      Neutrophil % 75.6 %      Lymphocyte % 17.8 %      Monocyte % 5.4 %      Eosinophil % 0.5 %      Basophil % 0.7 %      Neutrophils, Absolute 7.90 10*3/mm3      Lymphocytes, Absolute 1.90 10*3/mm3      Monocytes, Absolute 0.60  10*3/mm3      Eosinophils, Absolute 0.10 10*3/mm3      Basophils, Absolute 0.10 10*3/mm3      nRBC 0.1 /100 WBC     Lipid Panel [[id number]]  (Abnormal) Collected: 08/14/21 1919    Specimen: Blood Updated: 08/14/21 2125     Total Cholesterol 207 mg/dL      Triglycerides 106 mg/dL      HDL Cholesterol 38 mg/dL      LDL Cholesterol  150 mg/dL      VLDL Cholesterol 19 mg/dL      LDL/HDL Ratio 3.89    Narrative:      Cholesterol Reference Ranges  (U.S. Department of Health and Human Services ATP III Classifications)    Desirable          <200 mg/dL  Borderline High    200-239 mg/dL  High Risk          >240 mg/dL      Triglyceride Reference Ranges  (U.S. Department of Health and Human Services ATP III Classifications)    Normal           <150 mg/dL  Borderline High  150-199 mg/dL  High             200-499 mg/dL  Very High        >500 mg/dL    HDL Reference Ranges  (U.S. Department of Health and Human Services ATP III Classifcations)    Low     <40 mg/dl (major risk factor for CHD)  High    >60 mg/dl ('negative' risk factor for CHD)        LDL Reference Ranges  (U.S. Department of Health and Human Services ATP III Classifcations)    Optimal          <100 mg/dL  Near Optimal     100-129 mg/dL  Borderline High  130-159 mg/dL  High             160-189 mg/dL  Very High        >189 mg/dL    Hemoglobin A1c [[id number]] Collected: 08/14/21 1613    Specimen: Blood Updated: 08/14/21 2115    Troponin [[id number]]  (Normal) Collected: 08/14/21 1919    Specimen: Blood Updated: 08/14/21 1945     Troponin T 0.028 ng/mL     Narrative:      Troponin T Reference Range:  <= 0.03 ng/mL-   Negative for AMI  >0.03 ng/mL-     Abnormal for myocardial necrosis.  Clinicians would have to utilize clinical acumen, EKG, Troponin and serial changes to determine if it is an Acute Myocardial Infarction or myocardial injury due to an underlying chronic condition.       Results may be falsely decreased if patient taking Biotin.      Extra Tubes [[id number]]  Collected: 08/14/21 1615    Specimen: Blood, Venous Line Updated: 08/14/21 1705    Narrative:      The following orders were created for panel order Extra Tubes.  Procedure                               Abnormality         Status                     ---------                               -----------         ------                     Gold Top - SST[[id number]]                                   Final result                 Please view results for these tests on the individual orders.    Gold Top - SST [[id number]] Collected: 08/14/21 1615    Specimen: Blood Updated: 08/14/21 1705    D-dimer, Quantitative [[id number]]  (Abnormal) Collected: 08/14/21 1613    Specimen: Blood Updated: 08/14/21 1658     D-Dimer, Quantitative 0.75 mg/L (FEU)     Narrative:      Reference Range  --------------------------------------------------------------------     < 0.50   Negative Predictive Value  0.50-0.59   Indeterminate    >= 0.60   Probable VTE             A very low percentage of patients with DVT may yield D-Dimer results   below the cut-off of 0.50 mg/L FEU.  This is known to be more   prevalent in patients with distal DVT.             Results of this test should always be interpreted in conjunction with   the patient's medical history, clinical presentation and other   findings.  Clinical diagnosis should not be based on the result of   [place] D-Dimer alone.    Comprehensive Metabolic Panel [[id number]]  (Abnormal) Collected: 08/14/21 1613    Specimen: Blood Updated: 08/14/21 1648     Glucose 148 mg/dL      BUN 21 mg/dL      Creatinine 0.92 mg/dL      Sodium 139 mmol/L      Potassium 3.6 mmol/L      Chloride 104 mmol/L      CO2 20.0 mmol/L      Calcium 9.2 mg/dL      Total Protein 7.1 g/dL      Albumin 4.00 g/dL      ALT (SGPT) 36 U/L      AST (SGOT) 25 U/L      Alkaline Phosphatase 74 U/L      Total Bilirubin 0.4 mg/dL      eGFR Non African Amer 82 mL/min/1.73      Globulin 3.1 gm/dL      A/G Ratio 1.3 g/dL      BUN/Creatinine  Ratio 22.8     Anion Gap 15.0 mmol/L     Narrative:      GFR Normal >60  Chronic Kidney Disease <60  Kidney Failure <15      COVID PRE-OP / PRE-PROCEDURE SCREENING ORDER (NO ISOLATION) - Swab, Nasopharynx [[id number]]  (Normal) Collected: 08/14/21 1616    Specimen: Swab from Nasopharynx Updated: 08/14/21 1639    Narrative:      The following orders were created for panel order COVID PRE-OP / PRE-PROCEDURE SCREENING ORDER (NO ISOLATION) - Swab, Nasopharynx.  Procedure                               Abnormality         Status                     ---------                               -----------         ------                     COVID-19,CEPHEID/DANE/BD...[[id number]]  Normal              Final result                 Please view results for these tests on the individual orders.    COVID-19,CEPHEID/DANE/BDMAX,COR/[facility]/PAD/[facility] IN-HOUSE(OR EMERGENT/ADD-ON),NP SWAB IN TRANSPORT MEDIA 3-4 HR TAT, RT-PCR - Swab, Nasopharynx [[id number]]  (Normal) Collected: 08/14/21 1616    Specimen: Swab from Nasopharynx Updated: 08/14/21 1639     COVID19 Not Detected    Narrative:      Fact sheet for providers: https://www.fda.gov/media/213672/download     Fact sheet for patients: https://www.fda.gov/media/173451/download  Fact sheet for providers: https://www.fda.gov/media/808703/download    Fact sheet for patients: https://www.fda.gov/media/156239/download    Test performed by PCR.    Protime-INR [[id number]]  (Normal) Collected: 08/14/21 1613    Specimen: Blood Updated: 08/14/21 1631     Protime 10.8 Seconds      INR 0.97    CBC & Differential [[id number]]  (Abnormal) Collected: 08/14/21 1613    Specimen: Blood Updated: 08/14/21 1621    Narrative:      The following orders were created for panel order CBC & Differential.  Procedure                               Abnormality         Status                     ---------                               -----------         ------                     CBC Auto Differential[[id number]]        Abnormal             Final result                 Please view results for these tests on the individual orders.    CBC Auto Differential [[id number]]  (Abnormal) Collected: 08/14/21 1613    Specimen: Blood Updated: 08/14/21 1621     WBC 9.80 10*3/mm3      RBC 5.78 10*6/mm3      Hemoglobin 16.3 g/dL      Hematocrit 48.6 %      MCV 84.1 fL      MCH 28.2 pg      MCHC 33.6 g/dL      RDW 14.9 %      RDW-SD 44.2 fl      MPV 8.5 fL      Platelets 266 10*3/mm3      Neutrophil % 54.4 %      Lymphocyte % 35.8 %      Monocyte % 5.9 %      Eosinophil % 2.6 %      Basophil % 1.3 %      Neutrophils, Absolute 5.30 10*3/mm3      Lymphocytes, Absolute 3.50 10*3/mm3      Monocytes, Absolute 0.60 10*3/mm3      Eosinophils, Absolute 0.30 10*3/mm3      Basophils, Absolute 0.10 10*3/mm3      nRBC 0.1 /100 WBC           Imaging Results (Most Recent)     Procedure Component Value Units Date/Time    CT Chest Pulmonary Embolism [[id number]] Collected: 08/14/21 1737     Updated: 08/14/21 1751    Narrative:      CT CHEST PULMONARY EMBOLISM-     Date of Exam: 8/14/2021 5:26 PM     Indication: chest pain/back pain.     Comparison: Radiograph 08/14/2021     Technique: Serial and axial CT images of the chest were obtained  following the uneventful intravenous administration of 100 cc of  Isovue-370 contrast. Reconstructions in the coronal and sagittal planes  were also performed.  Automated exposure control and iterative  reconstruction methods were used.     FINDINGS:     Pulmonary Arteries: Excellent contrast opacification of the pulmonary  arteries.  No intraluminal filling defects to suggest pulmonary embolus.   The pulmonary arteries are normal in caliber.     Hilum and Mediastinum: No pathologically enlarged lymph nodes.  Normal  heart size.  No significant coronary artery atherosclerotic disease.  Unremarkable thoracic aorta.   No pericardial effusion.     Lung Parenchyma and Pleura: No focal consolidations.  No suspicious  pulmonary nodules.  No  endobronchial lesions.  No significant pleural  effusions.     Upper Abdomen: Unremarkable.     Soft tissues: Unremarkable.     Osseous structures: No aggressive focal lytic or sclerotic osseous  lesions.       Impression:         1. No evidence of pulmonary embolus.  2. No acute cardiopulmonary process.  3. Ancillary findings as described above.              Electronically Signed By-Irina Freeman MD On:8/14/2021 5:39 PM  This report was finalized on [id number] by  Irina Freeman MD.    XR Chest 1 View [[id number]] Collected: 08/14/21 1652     Updated: 08/14/21 1654    Narrative:      Examination: XR CHEST 1 VW-     Date of Exam: 8/14/2021 4:30 PM     Indication: chest pain.     Comparison: None available.     Technique: 1 view of the chest      Findings:  There are no airspace consolidations. No pleural effusions. No  pneumothorax. The heart size is normal. The pulmonary vasculature  appears within normal limits. No acute osseous abnormality identified.       Impression:      No acute cardiopulmonary process.     Electronically Signed By-Irina Freeman MD On:8/14/2021 4:52 PM  This report was finalized on [id number] by  Irina Freeman MD.        reviewed    ECG/EMG Results (most recent)     Procedure Component Value Units Date/Time    ECG 12 Lead [[id number]] Collected: 08/14/21 1556     Updated: 08/15/21 0652     QT Interval 426 ms     Narrative:      HEART RATE= 59  bpm  RR Interval= 1012  ms  AR Interval= 166  ms  P Horizontal Axis= 16  deg  P Front Axis= 52  deg  QRSD Interval= 94  ms  QT Interval= 426  ms  QRS Axis= 10  deg  T Wave Axis= 50  deg  - ABNORMAL ECG -  Sinus bradycardia  ST elevation, consider inferior injury  No previous ECG available for comparison  Electronically Signed By: Elie Melo ([facility]MAN[facility]EL) 15-Aug-2021 06:52:17  Date and Time of Study: 2021-08-14 15:56:21    Adult Transthoracic Echo Complete W/ Cont if Necessary Per Protocol [[id number]] Collected: 08/15/21 0751     Updated: 08/15/21 0910      BSA 2.3 m^2      RVIDd 1.7 cm      IVSd 0.9 cm      LVIDd 5.7 cm      LVIDs 3.6 cm      LVPWd 1.1 cm      IVS/LVPW 0.82     FS 36.8 %      EDV(Teich) 162.4 ml      ESV(Teich) 55.3 ml      EF(Teich) 66.0 %      EDV(cubed) 188.7 ml      ESV(cubed) 47.6 ml      EF(cubed) 74.8 %      LV mass(C)d 226.3 grams      LV mass(C)dI 100.0 grams/m^2      SV(Teich) 107.1 ml      SI(Teich) 47.3 ml/m^2      SV(cubed) 141.2 ml      SI(cubed) 62.4 ml/m^2      Ao root diam 3.7 cm      Ao root area 10.7 cm^2      ACS 1.9 cm      asc Aorta Diam 3.5 cm      LVOT diam 2.3 cm      LVOT area 4.1 cm^2      EDV(MOD-sp4) 97.1 ml      ESV(MOD-sp4) 43.4 ml      EF(MOD-sp4) 55.2 %      SV(MOD-sp4) 53.6 ml      SI(MOD-sp4) 23.7 ml/m^2      Ao root area (BSA corrected) 1.6     LV Petty Vol (BSA corrected) 42.9 ml/m^2      LV Sys Vol (BSA corrected) 19.2 ml/m^2      Aortic R-R 1.0 sec      Aortic HR 57.7 BPM      MV E max deneen 51.6 cm/sec      MV A max deneen 55.8 cm/sec      MV E/A 0.92     MV V2 max 64.3 cm/sec      MV max PG 1.7 mmHg      MV V2 mean 40.3 cm/sec      MV mean PG 0.75 mmHg      MV V2 VTI 22.7 cm      MVA(VTI) 3.5 cm^2      MV dec slope 201.2 cm/sec^2      MV dec time 0.26 sec      Ao pk deneen 96.3 cm/sec      Ao max PG 3.7 mmHg      Ao max PG (full) -0.05 mmHg      Ao V2 mean 64.2 cm/sec      Ao mean PG 1.9 mmHg      Ao mean PG (full) 0.19 mmHg      Ao V2 VTI 18.6 cm      SAMMI(I,A) 4.2 cm^2      SAMMI(I,D) 4.2 cm^2      SAMMI(V,A) 4.1 cm^2      SAMMI(V,D) 4.1 cm^2      LV V1 max PG 3.8 mmHg      LV V1 mean PG 1.7 mmHg      LV V1 max 97.0 cm/sec      LV V1 mean 59.4 cm/sec      LV V1 VTI 19.2 cm      CO(Ao) 11.5 l/min      CI(Ao) 5.1 l/min/m^2      SV(Ao) 199.0 ml      SI(Ao) 88.0 ml/m^2      CO(LVOT) 4.5 l/min      CI(LVOT) 2.0 l/min/m^2      SV(LVOT) 78.3 ml      SI(LVOT) 34.6 ml/m^2      PA V2 max 86.7 cm/sec      PA max PG 3.0 mmHg      PA max PG (full) 1.3 mmHg      PA V2 mean 66.1 cm/sec      PA mean PG 1.9 mmHg      PA mean PG (full) 1.1  mmHg      PA V2 VTI 18.9 cm      PA acc time 0.1 sec      RV V1 max PG 1.7 mmHg      RV V1 mean PG 0.8 mmHg      RV V1 max 65.0 cm/sec      RV V1 mean 41.5 cm/sec      RV V1 VTI 13.7 cm      TR max deneen 178.4 cm/sec      RVSP(TR) 15.7 mmHg      RAP systole 3.0 mmHg      PA pr(Accel) 32.8 mmHg       CV ECHO SAI - BZI_BMI 26.9 kilograms/m^2       CV ECHO SAI - BSA(HAYCOCK) 2.3 m^2       CV ECHO SAI - BZI_METRIC_WEIGHT 97.5 kg       CV ECHO SAI - BZI_METRIC_HEIGHT 190.5 cm      EF(MOD-bp) 55.0 %      LA dimension(2D) 4.4 cm         reviewed  EKG reviewed and interpreted by me demonstrates sinus rhythm with nonspecific ST-T wave abnormalities in the inferolateral leads      Assessment/Plan     Non-ST elevation myocardial infarction  Initial biomarkers negative  Trend biomarkers, presently at 2.0 indicative of myocardial injury  Continue heparin drip  Loaded with 600 of Plavix in addition to aspirin yesterday  High intensity statin  Fast lipid panel in the morning    Left heart cath this morning            I discussed the patient's findings and my recommendations with patient and staff and wife at bedside    Miky Cruz MD  08/15/21  10:51 EDT

## 2021-08-16 LAB
ANION GAP SERPL CALCULATED.3IONS-SCNC: 8 MMOL/L (ref 5–15)
BUN SERPL-MCNC: 15 MG/DL (ref 8–23)
BUN/CREAT SERPL: 17.6 (ref 7–25)
CALCIUM SPEC-SCNC: 8.7 MG/DL (ref 8.6–10.5)
CHLORIDE SERPL-SCNC: 105 MMOL/L (ref 98–107)
CHOLEST SERPL-MCNC: 176 MG/DL (ref 0–200)
CO2 SERPL-SCNC: 25 MMOL/L (ref 22–29)
CREAT SERPL-MCNC: 0.85 MG/DL (ref 0.76–1.27)
DEPRECATED RDW RBC AUTO: 45.5 FL (ref 37–54)
ERYTHROCYTE [DISTWIDTH] IN BLOOD BY AUTOMATED COUNT: 15 % (ref 12.3–15.4)
GFR SERPL CREATININE-BSD FRML MDRD: 90 ML/MIN/1.73
GLUCOSE SERPL-MCNC: 122 MG/DL (ref 65–99)
HBA1C MFR BLD: 6 % (ref 3.5–5.6)
HCT VFR BLD AUTO: 44.6 % (ref 37.5–51)
HDLC SERPL-MCNC: 38 MG/DL (ref 40–60)
HGB BLD-MCNC: 14.8 G/DL (ref 13–17.7)
LDLC SERPL CALC-MCNC: 116 MG/DL (ref 0–100)
LDLC/HDLC SERPL: 3 {RATIO}
MAGNESIUM SERPL-MCNC: 2 MG/DL (ref 1.6–2.4)
MCH RBC QN AUTO: 28.5 PG (ref 26.6–33)
MCHC RBC AUTO-ENTMCNC: 33.3 G/DL (ref 31.5–35.7)
MCV RBC AUTO: 85.7 FL (ref 79–97)
PLATELET # BLD AUTO: 164 10*3/MM3 (ref 140–450)
PMV BLD AUTO: 8.6 FL (ref 6–12)
POTASSIUM SERPL-SCNC: 4.2 MMOL/L (ref 3.5–5.2)
RBC # BLD AUTO: 5.2 10*6/MM3 (ref 4.14–5.8)
SODIUM SERPL-SCNC: 138 MMOL/L (ref 136–145)
TRIGL SERPL-MCNC: 120 MG/DL (ref 0–150)
TROPONIN T SERPL-MCNC: 2.13 NG/ML (ref 0–0.03)
VLDLC SERPL-MCNC: 22 MG/DL (ref 5–40)
WBC # BLD AUTO: 8.6 10*3/MM3 (ref 3.4–10.8)

## 2021-08-16 PROCEDURE — 99233 SBSQ HOSP IP/OBS HIGH 50: CPT | Performed by: INTERNAL MEDICINE

## 2021-08-16 PROCEDURE — 36415 COLL VENOUS BLD VENIPUNCTURE: CPT | Performed by: INTERNAL MEDICINE

## 2021-08-16 PROCEDURE — 93010 ELECTROCARDIOGRAM REPORT: CPT | Performed by: INTERNAL MEDICINE

## 2021-08-16 PROCEDURE — 83735 ASSAY OF MAGNESIUM: CPT | Performed by: INTERNAL MEDICINE

## 2021-08-16 PROCEDURE — 84484 ASSAY OF TROPONIN QUANT: CPT | Performed by: INTERNAL MEDICINE

## 2021-08-16 PROCEDURE — 93005 ELECTROCARDIOGRAM TRACING: CPT | Performed by: INTERNAL MEDICINE

## 2021-08-16 PROCEDURE — 80061 LIPID PANEL: CPT | Performed by: INTERNAL MEDICINE

## 2021-08-16 PROCEDURE — 85027 COMPLETE CBC AUTOMATED: CPT | Performed by: INTERNAL MEDICINE

## 2021-08-16 PROCEDURE — 80048 BASIC METABOLIC PNL TOTAL CA: CPT | Performed by: INTERNAL MEDICINE

## 2021-08-16 PROCEDURE — 94799 UNLISTED PULMONARY SVC/PX: CPT

## 2021-08-16 RX ADMIN — Medication 5 MG: at 20:20

## 2021-08-16 RX ADMIN — TICAGRELOR 90 MG: 90 TABLET ORAL at 08:23

## 2021-08-16 RX ADMIN — TICAGRELOR 90 MG: 90 TABLET ORAL at 20:20

## 2021-08-16 RX ADMIN — Medication 10 ML: at 20:21

## 2021-08-16 RX ADMIN — Medication 10 ML: at 08:23

## 2021-08-16 RX ADMIN — ACETAMINOPHEN 650 MG: 325 TABLET, FILM COATED ORAL at 19:13

## 2021-08-16 RX ADMIN — ATORVASTATIN CALCIUM 40 MG: 40 TABLET, FILM COATED ORAL at 20:20

## 2021-08-16 RX ADMIN — ASPIRIN 81 MG: 81 TABLET, COATED ORAL at 08:23

## 2021-08-16 NOTE — PLAN OF CARE
Goal Outcome Evaluation:  Plan of Care Reviewed With: patient, spouse        Progress: improving  Outcome Summary: no further c/o chest pain. Cardiology wishes to keep one more day to monitor troponin. Up ad oziel. Cath site clean & soft.

## 2021-08-16 NOTE — PROGRESS NOTES
Cardiology progress note  Miky Cruz MD, PhD      Patient Care Team:  Flavia Duggan APRN as PCP - General (Nurse Practitioner)    CHIEF COMPLAINT: Chest pain concerning for acute MI    HISTORY OF PRESENT ILLNESS:    This is a 66-year-old gentleman with no previous cardiac history, family history of CAD in his father, previous long-term and current smoker who presented to the ER with bilateral arm pain shoulder pain chest pain appearing diaphoretic dizzy who called EMS, EKG revealed very subtle abnormalities in the inferior leads but by the time the patient reached the hospital he overall was much improved with some residual left arm pain, troponin was initially unremarkable, patient was already ruled out for PE and aortic dissection with CTA of the chest and therefore was started on heparin in addition to empiric loading with P2 Y 12 inhibitor with Plavix.  I saw the patient he still had some left arm pain but EKG again did not reveal any ST elevation or depression significantly.  He says he has some musculoskeletal pain on the right which is always present.  No other fevers chill sweats nausea vomiting or diarrhea  =================================================================  Seen and examined,  No acute events overnight  Hemodynamically likely stable  Discussed cath results      Continue Brilinta twice daily in combination with aspirin  Titrate goal-directed medical therapy as needed by and allowed by hemodynamics  Acute MI    Review of systems negative x14 point review of systems except was mentioned above    Historical data copied forward from previous encounters in EMR is unchanged    Risk of left her catheterization including death heart attack stroke pain bleeding infection as well as others was discussed with the patient he wishes to proceed with left her catheterization ultimately    Risk and benefits of cardiac medicines with necessity of each discussed including P2 Y 12 inhibitors and  "blood thinners          Past Medical History:   Diagnosis Date   • Kidney stone      History reviewed. No pertinent surgical history.  Family History   Problem Relation Age of Onset   • Other Mother    • Other Sister    • Other Brother    • Heart attack Father      Social History     Tobacco Use   • Smoking status: Current Every Day Smoker     Packs/day: 0.25   • Smokeless tobacco: Never Used   Substance Use Topics   • Alcohol use: Not Currently   • Drug use: Not Currently     No medications prior to admission.     Allergies:  Patient has no known allergies.    REVIEW OF SYSTEMS:  Please see the above history of present illness for pertinent positives and negatives.  The remainder of the patient's systems have been reviewed and are negative.    Vital Signs  Temp:  [97.8 °F (36.6 °C)-98.3 °F (36.8 °C)] 98.3 °F (36.8 °C)  Heart Rate:  [58-83] 71  Resp:  [13-16] 13  BP: (104-138)/(55-87) 119/72    Flowsheet Rows      First Filed Value   Admission Height  190.5 cm (75\") Documented at 08/14/2021 1555   Admission Weight  102 kg (225 lb) Documented at 08/14/2021 1555           Physical Exam:  Physical Exam   Constitutional: Patient appears well-developed and well-nourished and in no acute distress   HEENT:   Head: Normocephalic and atraumatic.   Eyes:  Pupils are equal, round, and reactive to light. EOM are intact. Sclerae are anicteric and noninjected.  Mouth and Throat: Patient has moist mucous membranes. Oropharynx is clear of any erythema or exudate.     Neck: Neck supple. No JVD present. No thyromegaly present. No lymphadenopathy present.  Cardiovascular: Regular rate, regular rhythm, S1 normal and S2 normal.  Exam reveals no gallop and no friction rub.  No murmur heard.  Pulmonary/Chest: Lungs are clear to auscultation bilaterally. No respiratory distress. No wheezes. No rhonchi. No rales.   Abdominal: Soft. Bowel sounds are normal. No distension and no mass. There is no hepatosplenomegaly. There is no tenderness. "   Musculoskeletal: Normal muscle tone  Extremities: No edema. Pulses are palpable in all 4 extremities.  Neurological: Patient is alert and oriented to person, place, and time. Cranial nerves II-XII are grossly intact with no focal deficits.  Skin: Skin is warm. No rash noted. Nails show no clubbing.  No cyanosis or erythema.  Unchanged from prior encounter  Groin site stable  Otherwise exam unchanged     Results Review:    I reviewed the patient's new clinical results.  Lab Results (most recent)     Procedure Component Value Units Date/Time    POC Activated Clotting Time [[id number]]  (Abnormal) Collected: 08/15/21 1020    Specimen: Arterial Blood Updated: 08/15/21 1028     Activated Clotting Time  246 Seconds      Comment: Serial Number: [id number]:  [id number]       POC Activated Clotting Time [[id number]]  (Abnormal) Collected: 08/15/21 0958    Specimen: Arterial Blood Updated: 08/15/21 1028     Activated Clotting Time  219 Seconds      Comment: Serial Number: [id number]:  [id number]       aPTT [[id number]]  (Abnormal) Collected: 08/15/21 0824    Specimen: Blood Updated: 08/15/21 0845     PTT 47.3 seconds     POC Glucose Once [[id number]]  (Abnormal) Collected: 08/15/21 0805    Specimen: Blood Updated: 08/15/21 0806     Glucose 114 mg/dL      Comment: Serial Number: [id number]:  [id number]       Troponin [[id number]]  (Abnormal) Collected: 08/15/21 0227    Specimen: Blood Updated: 08/15/21 0256     Troponin T 2.110 ng/mL     Narrative:      Troponin T Reference Range:  <= 0.03 ng/mL-   Negative for AMI  >0.03 ng/mL-     Abnormal for myocardial necrosis.  Clinicians would have to utilize clinical acumen, EKG, Troponin and serial changes to determine if it is an Acute Myocardial Infarction or myocardial injury due to an underlying chronic condition.       Results may be falsely decreased if patient taking Biotin.      Basic Metabolic Panel [[id number]]  (Abnormal) Collected: 08/15/21 0227    Specimen: Blood  Updated: 08/15/21 0252     Glucose 131 mg/dL      BUN 18 mg/dL      Creatinine 0.78 mg/dL      Sodium 138 mmol/L      Potassium 4.4 mmol/L      Chloride 105 mmol/L      CO2 26.0 mmol/L      Calcium 9.1 mg/dL      eGFR Non African Amer 100 mL/min/1.73      BUN/Creatinine Ratio 23.1     Anion Gap 7.0 mmol/L     Narrative:      GFR Normal >60  Chronic Kidney Disease <60  Kidney Failure <15      Magnesium [[id number]]  (Normal) Collected: 08/15/21 0227    Specimen: Blood Updated: 08/15/21 0252     Magnesium 1.9 mg/dL     aPTT [[id number]]  (Abnormal) Collected: 08/15/21 0227    Specimen: Blood Updated: 08/15/21 0247     PTT 29.7 seconds     CBC & Differential [[id number]]  (Abnormal) Collected: 08/15/21 0227    Specimen: Blood Updated: 08/15/21 0233    Narrative:      The following orders were created for panel order CBC & Differential.  Procedure                               Abnormality         Status                     ---------                               -----------         ------                     CBC Auto Differential[[id number]]        Abnormal            Final result                 Please view results for these tests on the individual orders.    CBC Auto Differential [[id number]]  (Abnormal) Collected: 08/15/21 0227    Specimen: Blood Updated: 08/15/21 0233     WBC 10.40 10*3/mm3      RBC 5.45 10*6/mm3      Hemoglobin 15.4 g/dL      Hematocrit 46.1 %      MCV 84.6 fL      MCH 28.2 pg      MCHC 33.3 g/dL      RDW 14.6 %      RDW-SD 43.3 fl      MPV 8.2 fL      Platelets 192 10*3/mm3      Neutrophil % 75.6 %      Lymphocyte % 17.8 %      Monocyte % 5.4 %      Eosinophil % 0.5 %      Basophil % 0.7 %      Neutrophils, Absolute 7.90 10*3/mm3      Lymphocytes, Absolute 1.90 10*3/mm3      Monocytes, Absolute 0.60 10*3/mm3      Eosinophils, Absolute 0.10 10*3/mm3      Basophils, Absolute 0.10 10*3/mm3      nRBC 0.1 /100 WBC     Lipid Panel [[id number]]  (Abnormal) Collected: 08/14/21 1919    Specimen: Blood Updated:  08/14/21 2125     Total Cholesterol 207 mg/dL      Triglycerides 106 mg/dL      HDL Cholesterol 38 mg/dL      LDL Cholesterol  150 mg/dL      VLDL Cholesterol 19 mg/dL      LDL/HDL Ratio 3.89    Narrative:      Cholesterol Reference Ranges  (U.S. Department of Health and Human Services ATP III Classifications)    Desirable          <200 mg/dL  Borderline High    200-239 mg/dL  High Risk          >240 mg/dL      Triglyceride Reference Ranges  (U.S. Department of Health and Human Services ATP III Classifications)    Normal           <150 mg/dL  Borderline High  150-199 mg/dL  High             200-499 mg/dL  Very High        >500 mg/dL    HDL Reference Ranges  (U.S. Department of Health and Human Services ATP III Classifcations)    Low     <40 mg/dl (major risk factor for CHD)  High    >60 mg/dl ('negative' risk factor for CHD)        LDL Reference Ranges  (U.S. Department of Health and Human Services ATP III Classifcations)    Optimal          <100 mg/dL  Near Optimal     100-129 mg/dL  Borderline High  130-159 mg/dL  High             160-189 mg/dL  Very High        >189 mg/dL    Hemoglobin A1c [[id number]] Collected: 08/14/21 1613    Specimen: Blood Updated: 08/14/21 2115    Troponin [[id number]]  (Normal) Collected: 08/14/21 1919    Specimen: Blood Updated: 08/14/21 1945     Troponin T 0.028 ng/mL     Narrative:      Troponin T Reference Range:  <= 0.03 ng/mL-   Negative for AMI  >0.03 ng/mL-     Abnormal for myocardial necrosis.  Clinicians would have to utilize clinical acumen, EKG, Troponin and serial changes to determine if it is an Acute Myocardial Infarction or myocardial injury due to an underlying chronic condition.       Results may be falsely decreased if patient taking Biotin.      Extra Tubes [[id number]] Collected: 08/14/21 1615    Specimen: Blood, Venous Line Updated: 08/14/21 1705    Narrative:      The following orders were created for panel order Extra Tubes.  Procedure                                Abnormality         Status                     ---------                               -----------         ------                     Gold Top - SST[[id number]]                                   Final result                 Please view results for these tests on the individual orders.    Gold Top - SST [[id number]] Collected: 08/14/21 1615    Specimen: Blood Updated: 08/14/21 1705    D-dimer, Quantitative [[id number]]  (Abnormal) Collected: 08/14/21 1613    Specimen: Blood Updated: 08/14/21 1658     D-Dimer, Quantitative 0.75 mg/L (FEU)     Narrative:      Reference Range  --------------------------------------------------------------------     < 0.50   Negative Predictive Value  0.50-0.59   Indeterminate    >= 0.60   Probable VTE             A very low percentage of patients with DVT may yield D-Dimer results   below the cut-off of 0.50 mg/L FEU.  This is known to be more   prevalent in patients with distal DVT.             Results of this test should always be interpreted in conjunction with   the patient's medical history, clinical presentation and other   findings.  Clinical diagnosis should not be based on the result of   [place] D-Dimer alone.    Comprehensive Metabolic Panel [[id number]]  (Abnormal) Collected: 08/14/21 1613    Specimen: Blood Updated: 08/14/21 1648     Glucose 148 mg/dL      BUN 21 mg/dL      Creatinine 0.92 mg/dL      Sodium 139 mmol/L      Potassium 3.6 mmol/L      Chloride 104 mmol/L      CO2 20.0 mmol/L      Calcium 9.2 mg/dL      Total Protein 7.1 g/dL      Albumin 4.00 g/dL      ALT (SGPT) 36 U/L      AST (SGOT) 25 U/L      Alkaline Phosphatase 74 U/L      Total Bilirubin 0.4 mg/dL      eGFR Non African Amer 82 mL/min/1.73      Globulin 3.1 gm/dL      A/G Ratio 1.3 g/dL      BUN/Creatinine Ratio 22.8     Anion Gap 15.0 mmol/L     Narrative:      GFR Normal >60  Chronic Kidney Disease <60  Kidney Failure <15      COVID PRE-OP / PRE-PROCEDURE SCREENING ORDER (NO ISOLATION) - Swab,  Nasopharynx [[id number]]  (Normal) Collected: 08/14/21 1616    Specimen: Swab from Nasopharynx Updated: 08/14/21 1639    Narrative:      The following orders were created for panel order COVID PRE-OP / PRE-PROCEDURE SCREENING ORDER (NO ISOLATION) - Swab, Nasopharynx.  Procedure                               Abnormality         Status                     ---------                               -----------         ------                     COVID-19,CEPHEID/DANE/BD...[[id number]]  Normal              Final result                 Please view results for these tests on the individual orders.    COVID-19,CEPHEID/DANE/BDMAX,COR/[facility]/PAD/[facility] IN-HOUSE(OR EMERGENT/ADD-ON),NP SWAB IN TRANSPORT MEDIA 3-4 HR TAT, RT-PCR - Swab, Nasopharynx [[id number]]  (Normal) Collected: 08/14/21 1616    Specimen: Swab from Nasopharynx Updated: 08/14/21 1639     COVID19 Not Detected    Narrative:      Fact sheet for providers: https://www.fda.gov/media/087036/download     Fact sheet for patients: https://www.fda.gov/media/009618/download  Fact sheet for providers: https://www.fda.gov/media/398307/download    Fact sheet for patients: https://www.fda.gov/media/344532/download    Test performed by PCR.    Protime-INR [[id number]]  (Normal) Collected: 08/14/21 1613    Specimen: Blood Updated: 08/14/21 1631     Protime 10.8 Seconds      INR 0.97    CBC & Differential [[id number]]  (Abnormal) Collected: 08/14/21 1613    Specimen: Blood Updated: 08/14/21 1621    Narrative:      The following orders were created for panel order CBC & Differential.  Procedure                               Abnormality         Status                     ---------                               -----------         ------                     CBC Auto Differential[[id number]]        Abnormal            Final result                 Please view results for these tests on the individual orders.    CBC Auto Differential [[id number]]  (Abnormal) Collected: 08/14/21 1613    Specimen:  Blood Updated: 08/14/21 1621     WBC 9.80 10*3/mm3      RBC 5.78 10*6/mm3      Hemoglobin 16.3 g/dL      Hematocrit 48.6 %      MCV 84.1 fL      MCH 28.2 pg      MCHC 33.6 g/dL      RDW 14.9 %      RDW-SD 44.2 fl      MPV 8.5 fL      Platelets 266 10*3/mm3      Neutrophil % 54.4 %      Lymphocyte % 35.8 %      Monocyte % 5.9 %      Eosinophil % 2.6 %      Basophil % 1.3 %      Neutrophils, Absolute 5.30 10*3/mm3      Lymphocytes, Absolute 3.50 10*3/mm3      Monocytes, Absolute 0.60 10*3/mm3      Eosinophils, Absolute 0.30 10*3/mm3      Basophils, Absolute 0.10 10*3/mm3      nRBC 0.1 /100 WBC           Imaging Results (Most Recent)     Procedure Component Value Units Date/Time    CT Chest Pulmonary Embolism [[id number]] Collected: 08/14/21 1737     Updated: 08/14/21 1751    Narrative:      CT CHEST PULMONARY EMBOLISM-     Date of Exam: 8/14/2021 5:26 PM     Indication: chest pain/back pain.     Comparison: Radiograph 08/14/2021     Technique: Serial and axial CT images of the chest were obtained  following the uneventful intravenous administration of 100 cc of  Isovue-370 contrast. Reconstructions in the coronal and sagittal planes  were also performed.  Automated exposure control and iterative  reconstruction methods were used.     FINDINGS:     Pulmonary Arteries: Excellent contrast opacification of the pulmonary  arteries.  No intraluminal filling defects to suggest pulmonary embolus.   The pulmonary arteries are normal in caliber.     Hilum and Mediastinum: No pathologically enlarged lymph nodes.  Normal  heart size.  No significant coronary artery atherosclerotic disease.  Unremarkable thoracic aorta.   No pericardial effusion.     Lung Parenchyma and Pleura: No focal consolidations.  No suspicious  pulmonary nodules.  No endobronchial lesions.  No significant pleural  effusions.     Upper Abdomen: Unremarkable.     Soft tissues: Unremarkable.     Osseous structures: No aggressive focal lytic or sclerotic  osseous  lesions.       Impression:         1. No evidence of pulmonary embolus.  2. No acute cardiopulmonary process.  3. Ancillary findings as described above.              Electronically Signed By-Irina Freeman MD On:8/14/2021 5:39 PM  This report was finalized on [id number] by  Irina Freeman MD.    XR Chest 1 View [[id number]] Collected: 08/14/21 1652     Updated: 08/14/21 1654    Narrative:      Examination: XR CHEST 1 VW-     Date of Exam: 8/14/2021 4:30 PM     Indication: chest pain.     Comparison: None available.     Technique: 1 view of the chest      Findings:  There are no airspace consolidations. No pleural effusions. No  pneumothorax. The heart size is normal. The pulmonary vasculature  appears within normal limits. No acute osseous abnormality identified.       Impression:      No acute cardiopulmonary process.     Electronically Signed By-Irina Freeman MD On:8/14/2021 4:52 PM  This report was finalized on [id number] by  Irina Freeman MD.        reviewed    ECG/EMG Results (most recent)     Procedure Component Value Units Date/Time    ECG 12 Lead [[id number]] Collected: 08/14/21 1556     Updated: 08/15/21 0652     QT Interval 426 ms     Narrative:      HEART RATE= 59  bpm  RR Interval= 1012  ms  NM Interval= 166  ms  P Horizontal Axis= 16  deg  P Front Axis= 52  deg  QRSD Interval= 94  ms  QT Interval= 426  ms  QRS Axis= 10  deg  T Wave Axis= 50  deg  - ABNORMAL ECG -  Sinus bradycardia  ST elevation, consider inferior injury  No previous ECG available for comparison  Electronically Signed By: Elie Melo ([facility]MAN[facility]EL) 15-Aug-2021 06:52:17  Date and Time of Study: 2021-08-14 15:56:21    Adult Transthoracic Echo Complete W/ Cont if Necessary Per Protocol [[id number]] Collected: 08/15/21 0751     Updated: 08/15/21 1850     BSA 2.3 m^2      RVIDd 1.7 cm      IVSd 0.9 cm      LVIDd 5.7 cm      LVIDs 3.6 cm      LVPWd 1.1 cm      IVS/LVPW 0.82     FS 36.8 %      EDV(Teich) 162.4 ml      ESV(Teich) 55.3 ml       EF(Teich) 66.0 %      EDV(cubed) 188.7 ml      ESV(cubed) 47.6 ml      EF(cubed) 74.8 %      LV mass(C)d 226.3 grams      LV mass(C)dI 100.0 grams/m^2      SV(Teich) 107.1 ml      SI(Teich) 47.3 ml/m^2      SV(cubed) 141.2 ml      SI(cubed) 62.4 ml/m^2      Ao root diam 3.7 cm      Ao root area 10.7 cm^2      ACS 1.9 cm      asc Aorta Diam 3.5 cm      LVOT diam 2.3 cm      LVOT area 4.1 cm^2      EDV(MOD-sp4) 97.1 ml      ESV(MOD-sp4) 43.4 ml      EF(MOD-sp4) 55.2 %      SV(MOD-sp4) 53.6 ml      SI(MOD-sp4) 23.7 ml/m^2      Ao root area (BSA corrected) 1.6     LV Petty Vol (BSA corrected) 42.9 ml/m^2      LV Sys Vol (BSA corrected) 19.2 ml/m^2      Aortic R-R 1.0 sec      Aortic HR 57.7 BPM      MV E max deneen 51.6 cm/sec      MV A max deneen 55.8 cm/sec      MV E/A 0.92     MV V2 max 64.3 cm/sec      MV max PG 1.7 mmHg      MV V2 mean 40.3 cm/sec      MV mean PG 0.75 mmHg      MV V2 VTI 22.7 cm      MVA(VTI) 3.5 cm^2      MV dec slope 201.2 cm/sec^2      MV dec time 0.26 sec      Ao pk deneen 96.3 cm/sec      Ao max PG 3.7 mmHg      Ao max PG (full) -0.05 mmHg      Ao V2 mean 64.2 cm/sec      Ao mean PG 1.9 mmHg      Ao mean PG (full) 0.19 mmHg      Ao V2 VTI 18.6 cm      SAMMI(I,A) 4.2 cm^2      SAMMI(I,D) 4.2 cm^2      SAMMI(V,A) 4.1 cm^2      SAMMI(V,D) 4.1 cm^2      LV V1 max PG 3.8 mmHg      LV V1 mean PG 1.7 mmHg      LV V1 max 97.0 cm/sec      LV V1 mean 59.4 cm/sec      LV V1 VTI 19.2 cm      CO(Ao) 11.5 l/min      CI(Ao) 5.1 l/min/m^2      SV(Ao) 199.0 ml      SI(Ao) 88.0 ml/m^2      CO(LVOT) 4.5 l/min      CI(LVOT) 2.0 l/min/m^2      SV(LVOT) 78.3 ml      SI(LVOT) 34.6 ml/m^2      PA V2 max 86.7 cm/sec      PA max PG 3.0 mmHg      PA max PG (full) 1.3 mmHg      PA V2 mean 66.1 cm/sec      PA mean PG 1.9 mmHg      PA mean PG (full) 1.1 mmHg      PA V2 VTI 18.9 cm      PA acc time 0.1 sec      RV V1 max PG 1.7 mmHg      RV V1 mean PG 0.8 mmHg      RV V1 max 65.0 cm/sec      RV V1 mean 41.5 cm/sec      RV V1 VTI 13.7 cm       TR max deneen 178.4 cm/sec      RVSP(TR) 15.7 mmHg      RAP systole 3.0 mmHg      PA pr(Accel) 32.8 mmHg       CV ECHO SAI - BZI_BMI 26.9 kilograms/m^2       CV ECHO SAI - BSA(HAYCOCK) 2.3 m^2       CV ECHO SAI - BZI_METRIC_WEIGHT 97.5 kg       CV ECHO SAI - BZI_METRIC_HEIGHT 190.5 cm      EF(MOD-bp) 55.0 %      LA dimension(2D) 4.4 cm     Addenda:          · Estimated left ventricular EF was in agreement with the calculated left   ventricular EF. Left ventricular ejection fraction appears to be 51 - 55%.   Left ventricular systolic function is normal.  · Left ventricular diastolic function was normal.  · Estimated right ventricular systolic pressure from tricuspid   regurgitation is normal (<35 mmHg).     Overall EF 55%  Regional abnormalities noted with hypokinesis of the lateral wall mild to   moderate  Normal diastolic function by criteria  Normal atrial sizes  No significant valvular abnormality identified  Normal PA systolic pressure  No masses or effusions      Signed: 08/15/21 1850 by Miky Cruz MD    Narrative:      · Estimated left ventricular EF was in agreement with the calculated left   ventricular EF. Left ventricular ejection fraction appears to be 51 - 55%.   Left ventricular systolic function is normal.  · Left ventricular diastolic function was normal.  · Estimated right ventricular systolic pressure from tricuspid   regurgitation is normal (<35 mmHg).       ECG 12 Lead [[id number]] Collected: 08/16/21 0557     Updated: 08/16/21 0559     QT Interval 404 ms     Narrative:      HEART RATE= 63  bpm  RR Interval= 952  ms  RI Interval= 152  ms  P Horizontal Axis= -12  deg  P Front Axis= 58  deg  QRSD Interval= 103  ms  QT Interval= 404  ms  QRS Axis= -13  deg  T Wave Axis= 65  deg  - ABNORMAL ECG -  Sinus rhythm  Incomplete right bundle branch block  Left anterior fascicular block  When compared with ECG of 14-Aug-2021 15:56:21,  New conduction abnormality  Electronically Signed  By:   Date and Time of Study: 2021-08-16 05:57:56        reviewed  EKG reviewed and interpreted by me demonstrates sinus rhythm with nonspecific ST-T wave abnormalities in the inferolateral leads      Assessment/Plan     Non-ST elevation myocardial infarction  Overall EF preserved 55% with lateral wall hypokinesis in setting of acute MI    Status post OM circumflex revascularization successfully  Continue aspirin Brilinta  If remains stable chest pain-free likely home tomorrow  Titrate goal-directed medical therapy as allowed by hemodynamics  High intensity statin therapy  Beta-blocker      Miky Cruz MD, PhD    Possibly home tomorrow        I discussed the patient's findings and my recommendations with patient and staff and wife at bedside    Miky Cruz MD  08/16/21  12:16 EDT

## 2021-08-16 NOTE — PROGRESS NOTES
[facility] Medicine Services Daily Progress Note    Patient Name: Gil Garcia  : 1955  MRN: [id number]  Primary Care Physician:  Flavia Duggan, JIGNA  Date of admission: 2021      Subjective      Chief Complaint: chest pain    Subjective-patient reports  2021  Patient seen and examined  Denies any chest pain, shortness of breath or palpitation  Stated feeling overall okay    Review of Systems   Constitutional: Negative for chills and fever.   HENT: Negative for congestion.    Eyes: Negative for blurred vision.   Cardiovascular: Negative for chest pain.   Respiratory: Negative for shortness of breath.    Endocrine: Negative for cold intolerance and heat intolerance.   Gastrointestinal: Negative for abdominal pain, nausea and vomiting.   Genitourinary: Negative for flank pain.   Neurological: Negative for focal weakness.   Psychiatric/Behavioral: Negative for altered mental status.   All other systems reviewed and are negative.         Objective      Vitals:   Temp:  [97.8 °F (36.6 °C)-98.3 °F (36.8 °C)] 98 °F (36.7 °C)  Heart Rate:  [58-83] 64  Resp:  [13-15] 15  BP: (104-138)/(55-87) 127/76    Physical Exam  Vitals reviewed.   Constitutional:       General: He is not in acute distress.     Appearance: He is well-developed.   HENT:      Head: Normocephalic.      Nose: Nose normal.      Mouth/Throat:      Mouth: Mucous membranes are moist.   Eyes:      Extraocular Movements: Extraocular movements intact.      Conjunctiva/sclera: Conjunctivae normal.      Pupils: Pupils are equal, round, and reactive to light.   Neck:      Thyroid: No thyromegaly.      Vascular: No JVD.      Trachea: No tracheal deviation.   Cardiovascular:      Rate and Rhythm: Normal rate and regular rhythm.   Pulmonary:      Effort: No respiratory distress.      Breath sounds: Normal breath sounds.   Abdominal:      General: Bowel sounds are normal.      Palpations: Abdomen is soft.      Tenderness:  There is no abdominal tenderness.   Musculoskeletal:         General: Normal range of motion.      Cervical back: Normal range of motion. No muscular tenderness.   Skin:     General: Skin is warm and dry.   Neurological:      General: No focal deficit present.      Mental Status: He is alert and oriented to person, place, and time.      Motor: No abnormal muscle tone.   Psychiatric:         Mood and Affect: Mood normal.         Behavior: Behavior normal.             Result Review    Result Review:  I have personally reviewed the results from the time of this admission to 8/16/2021 13:16 EDT and agree with these findings:  [x]  Laboratory  [x]  Microbiology  [x]  Radiology  [x]  EKG/Telemetry   []  Cardiology/Vascular   []  Pathology  []  Old records  []  Other:  Most notable findings include:         Assessment/Plan      Brief Patient Summary:    66-year-old man with no significant past medical history, presented to [facility] ED on 8/14/2021 with complaints of midsternal chest pain radiating to the back and both arms.  Pain was of moderate intensity and sharp in nature.  Had associated shortness of breath, diaphoresis and dizziness.  No aggravating or relieving factor.  In the ED CT PE protocol done no evidence of PE or aortic dissection.  Chest x-ray showed no acute cardiopulmonary abnormality.  EKG showed nonspecific changes in the inferior leads.  Serum troponin was elevated.  Patient was started on aspirin, Plavix heparin drip.  Cardiologist was consulted and patient was admitted to the hospitalist group.  Patient underwent cardiac catheterization on 8/15/2021 with drug-eluting stents placement from the obtuse marginal branch to proximal circumflex.  Was continued on medical management with high intensity statin therapy, aspirin and Brilinta.    aspirin, 324 mg, Oral, Once  aspirin, 81 mg, Oral, Daily  atorvastatin, 40 mg, Oral, Nightly  sodium chloride, 10 mL, Intravenous, Q12H  ticagrelor, 90 mg,  Oral, BID             Active Hospital Problems:  Active Hospital Problems    Diagnosis    • **Unstable angina (CMS/[facility])      A/P    Non-ST elevation MI  Status post cardiac catheterization with drug-eluting stent placement from obtuse marginal branch to proximal circumflex.  8/15/2021  Continue medical management-on aspirin, Brilinta and statin  Cardiologist following      Dyslipidemia ... on statin.            DVT prophylaxis:  Medical DVT prophylaxis orders are present.     CODE STATUS:    Level Of Support Discussed With: Patient  Code Status: CPR  Medical Interventions (Level of Support Prior to Arrest): Full         DVT prophylaxis:  No DVT prophylaxis order currently exists.    CODE STATUS:    Level Of Support Discussed With: Patient  Code Status: CPR  Medical Interventions (Level of Support Prior to Arrest): Full      Disposition: Discharge home when cleared by cardiologist    This patient has been examined wearing appropriate Personal Protective Equipment and discussed with hospital infection control department. 08/16/21      Electronically signed by Stephen Huggins MD, 08/16/21, 13:16 EDT.  [facility] Hospitalist Team

## 2021-08-16 NOTE — PLAN OF CARE
Goal Outcome Evaluation:         Pt had cardiac cath with 2 stents to circumflex. Bedrest ended at the end of day shift. Site is clean and dry without any hematoma. Pt denies any pain. Has been able to ambulate to bathroom multiple times without any difficulty. Vital signs have remained stable. Will continue to monitor.

## 2021-08-16 NOTE — CASE MANAGEMENT/SOCIAL WORK
Discharge Planning Assessment  [facility]     Patient Name: Gil Garcia  MRN: [id number]  Today's Date: 8/16/2021    Admit Date: 8/14/2021    Discharge Needs Assessment     Row Name 08/16/21 1051       Living Environment    Lives With  spouse    Current Living Arrangements  home/apartment/condo    Quality of Family Relationships  supportive    Able to Return to Prior Arrangements  yes       Resource/Environmental Concerns    Resource/Environmental Concerns  none       Transition Planning    Patient/Family Anticipates Transition to  home with family    Patient/Family Anticipated Services at Transition  none    Transportation Anticipated  family or friend will provide       Discharge Needs Assessment    Readmission Within the Last 30 Days  no previous admission in last 30 days    Equipment Currently Used at Home  none        Discharge Plan     Row Name 08/16/21 1051       Plan    Plan  D/C Plan : Anticipate routine to home    Patient/Family in Agreement with Plan  yes    Plan Comments  S/P cath with 2 stents placed        Continued Care and Services - Admitted Since 8/14/2021    Coordination has not been started for this encounter.         Demographic Summary     Row Name 08/16/21 1050       General Information    Admission Type  inpatient    Arrived From  emergency department    Required Notices Provided  Important Message from Medicare    Preferred Language  English     Used During This Interaction  no        Functional Status     Row Name 08/16/21 1050       Functional Status    Usual Activity Tolerance  good    Current Activity Tolerance  good       Mental Status    General Appearance WDL  WDL       Mental Status Summary    Recent Changes in Mental Status/Cognitive Functioning  no changes                Grace Delaney RN

## 2021-08-17 ENCOUNTER — READMISSION MANAGEMENT (OUTPATIENT)
Dept: CALL CENTER | Facility: HOSPITAL | Age: 66
End: 2021-08-17

## 2021-08-17 VITALS
WEIGHT: 213.7 LBS | SYSTOLIC BLOOD PRESSURE: 107 MMHG | DIASTOLIC BLOOD PRESSURE: 72 MMHG | RESPIRATION RATE: 15 BRPM | HEIGHT: 75 IN | HEART RATE: 64 BPM | OXYGEN SATURATION: 98 % | BODY MASS INDEX: 26.57 KG/M2 | TEMPERATURE: 98.9 F

## 2021-08-17 LAB
ANION GAP SERPL CALCULATED.3IONS-SCNC: 10 MMOL/L (ref 5–15)
BUN SERPL-MCNC: 22 MG/DL (ref 8–23)
BUN/CREAT SERPL: 26.2 (ref 7–25)
CALCIUM SPEC-SCNC: 8.8 MG/DL (ref 8.6–10.5)
CHLORIDE SERPL-SCNC: 103 MMOL/L (ref 98–107)
CO2 SERPL-SCNC: 24 MMOL/L (ref 22–29)
CREAT SERPL-MCNC: 0.84 MG/DL (ref 0.76–1.27)
GFR SERPL CREATININE-BSD FRML MDRD: 91 ML/MIN/1.73
GLUCOSE SERPL-MCNC: 109 MG/DL (ref 65–99)
POTASSIUM SERPL-SCNC: 4.1 MMOL/L (ref 3.5–5.2)
SODIUM SERPL-SCNC: 137 MMOL/L (ref 136–145)

## 2021-08-17 PROCEDURE — 99239 HOSP IP/OBS DSCHRG MGMT >30: CPT | Performed by: INTERNAL MEDICINE

## 2021-08-17 PROCEDURE — 99232 SBSQ HOSP IP/OBS MODERATE 35: CPT | Performed by: INTERNAL MEDICINE

## 2021-08-17 PROCEDURE — 80048 BASIC METABOLIC PNL TOTAL CA: CPT | Performed by: INTERNAL MEDICINE

## 2021-08-17 RX ORDER — ASPIRIN 81 MG/1
81 TABLET ORAL DAILY
Qty: 30 TABLET | Refills: 1 | Status: SHIPPED | OUTPATIENT
Start: 2021-08-17 | End: 2021-09-16

## 2021-08-17 RX ORDER — ATORVASTATIN CALCIUM 40 MG/1
40 TABLET, FILM COATED ORAL NIGHTLY
Qty: 30 TABLET | Refills: 1 | Status: SHIPPED | OUTPATIENT
Start: 2021-08-17 | End: 2021-08-23 | Stop reason: SDUPTHER

## 2021-08-17 RX ADMIN — TICAGRELOR 90 MG: 90 TABLET ORAL at 07:35

## 2021-08-17 RX ADMIN — ASPIRIN 81 MG: 81 TABLET, COATED ORAL at 07:35

## 2021-08-17 RX ADMIN — Medication 10 ML: at 07:34

## 2021-08-17 NOTE — PLAN OF CARE
Goal Outcome Evaluation:  Plan of Care Reviewed With: patient, spouse        Progress: improving  Outcome Summary: no c/o chest pain. Cath site clean. Likely to d/c home today

## 2021-08-17 NOTE — PLAN OF CARE
Problem: Adult Inpatient Plan of Care  Goal: Absence of Hospital-Acquired Illness or Injury  Intervention: Identify and Manage Fall Risk  Recent Flowsheet Documentation  Taken 8/17/2021 0341 by Tori Benitez RN  Safety Promotion/Fall Prevention:   assistive device/personal items within reach   clutter free environment maintained   fall prevention program maintained   lighting adjusted   nonskid shoes/slippers when out of bed   room organization consistent   safety round/check completed  Taken 8/17/2021 0118 by Tori Benitez RN  Safety Promotion/Fall Prevention:   assistive device/personal items within reach   clutter free environment maintained   fall prevention program maintained   lighting adjusted   nonskid shoes/slippers when out of bed   room organization consistent   safety round/check completed  Taken 8/16/2021 2315 by Tori Benitez RN  Safety Promotion/Fall Prevention:   assistive device/personal items within reach   clutter free environment maintained   fall prevention program maintained   lighting adjusted   nonskid shoes/slippers when out of bed   room organization consistent   safety round/check completed  Taken 8/16/2021 2133 by Tori Benitez RN  Safety Promotion/Fall Prevention:   assistive device/personal items within reach   clutter free environment maintained   fall prevention program maintained   lighting adjusted   nonskid shoes/slippers when out of bed   room organization consistent   safety round/check completed  Taken 8/16/2021 1913 by Tori Benitez RN  Safety Promotion/Fall Prevention:   assistive device/personal items within reach   clutter free environment maintained   fall prevention program maintained   lighting adjusted   nonskid shoes/slippers when out of bed   safety round/check completed   room organization consistent  Intervention: Prevent Skin Injury  Recent Flowsheet Documentation  Taken 8/17/2021 0341 by Tori Benitez RN  Body Position: position  changed independently  Taken 8/17/2021 0118 by Tori Benitez RN  Body Position: position changed independently  Taken 8/16/2021 2315 by Tori Benitez RN  Body Position: position changed independently  Taken 8/16/2021 2133 by Tori Benitez RN  Body Position: position changed independently  Taken 8/16/2021 1913 by Tori Benitez RN  Body Position: position changed independently  Intervention: Prevent Infection  Recent Flowsheet Documentation  Taken 8/17/2021 0341 by Tori Benitez RN  Infection Prevention:   hand hygiene promoted   personal protective equipment utilized  Taken 8/17/2021 0118 by Tori Benitez RN  Infection Prevention:   hand hygiene promoted   personal protective equipment utilized  Taken 8/16/2021 2315 by Tori Benitez RN  Infection Prevention:   hand hygiene promoted   personal protective equipment utilized  Taken 8/16/2021 2133 by Tori Benitez RN  Infection Prevention:   hand hygiene promoted   personal protective equipment utilized  Taken 8/16/2021 1913 by Tori Benitez RN  Infection Prevention:   hand hygiene promoted   personal protective equipment utilized  Goal: Optimal Comfort and Wellbeing  Intervention: Provide Person-Centered Care  Recent Flowsheet Documentation  Taken 8/16/2021 1913 by Tori Benitez RN  Trust Relationship/Rapport: care explained     Problem: Skin Injury Risk Increased  Goal: Skin Health and Integrity  Intervention: Optimize Skin Protection  Recent Flowsheet Documentation  Taken 8/17/2021 0341 by Tori Benitez RN  Head of Bed (HOB): HOB elevated  Taken 8/17/2021 0118 by Tori Benitez RN  Head of Bed (HOB): HOB elevated  Taken 8/16/2021 2315 by Tori Benitez RN  Head of Bed (HOB): HOB elevated  Taken 8/16/2021 2133 by Tori Benitez RN  Head of Bed (HOB): HOB elevated  Taken 8/16/2021 1913 by Tori Benitez RN  Pressure Reduction Techniques: frequent weight shift encouraged  Head of Bed (HOB): HOB  elevated     Problem: Fall Injury Risk  Goal: Absence of Fall and Fall-Related Injury  Intervention: Identify and Manage Contributors to Fall Injury Risk  Recent Flowsheet Documentation  Taken 8/16/2021 2133 by Tori Benitez RN  Medication Review/Management: medications reviewed  Taken 8/16/2021 1913 by Tori Benitez RN  Medication Review/Management: medications reviewed  Intervention: Promote Injury-Free Environment  Recent Flowsheet Documentation  Taken 8/17/2021 0341 by Tori Benitez RN  Safety Promotion/Fall Prevention:   assistive device/personal items within reach   clutter free environment maintained   fall prevention program maintained   lighting adjusted   nonskid shoes/slippers when out of bed   room organization consistent   safety round/check completed  Taken 8/17/2021 0118 by Tori Benitez RN  Safety Promotion/Fall Prevention:   assistive device/personal items within reach   clutter free environment maintained   fall prevention program maintained   lighting adjusted   nonskid shoes/slippers when out of bed   room organization consistent   safety round/check completed  Taken 8/16/2021 2315 by Tori Benitez RN  Safety Promotion/Fall Prevention:   assistive device/personal items within reach   clutter free environment maintained   fall prevention program maintained   lighting adjusted   nonskid shoes/slippers when out of bed   room organization consistent   safety round/check completed  Taken 8/16/2021 2133 by Tori Benitez RN  Safety Promotion/Fall Prevention:   assistive device/personal items within reach   clutter free environment maintained   fall prevention program maintained   lighting adjusted   nonskid shoes/slippers when out of bed   room organization consistent   safety round/check completed  Taken 8/16/2021 1913 by Tori Benitez, RN  Safety Promotion/Fall Prevention:   assistive device/personal items within reach   clutter free environment maintained   fall  prevention program maintained   lighting adjusted   nonskid shoes/slippers when out of bed   safety round/check completed   room organization consistent   Goal Outcome Evaluation:      Pt with no complaints through the night. Will continue to monitor.Possible DC today.

## 2021-08-17 NOTE — DISCHARGE SUMMARY
[facility] Medicine Services  DISCHARGE SUMMARY    Patient Name: Gil Garcia  : 1955  MRN: [id number]    Date of Admission: 2021  Date of Discharge: 2021  Primary Care Physician: Flavia Duggan APRN      Presenting Problem:   Unstable angina (CMS/HCC) [I20.0]  Chest pain, unspecified type [R07.9]    Active and Resolved Hospital Problems:  Active Hospital Problems    Diagnosis POA   • **Unstable angina (CMS/HCC) [I20.0] Yes      Resolved Hospital Problems   No resolved problems to display.         Hospital Course     Hospital Course:    66-year-old man with no significant past medical history, presented to [facility] ED on 2021 with complaints of midsternal chest pain radiating to the back and both arms.  Pain was of moderate intensity and sharp in nature.  Had associated shortness of breath, diaphoresis and dizziness.  No aggravating or relieving factor.  In the ED CT PE protocol done no evidence of PE or aortic dissection.  Chest x-ray showed no acute cardiopulmonary abnormality.  EKG showed nonspecific changes in the inferior leads.  Serum troponin was elevated.  Patient was started on aspirin, Plavix and heparin drip.  Cardiologist was consulted and patient was admitted to the hospitalist group.  Patient underwent cardiac catheterization on 8/15/2021 with drug-eluting stents placement from the obtuse marginal branch to proximal circumflex.  Was continued on medical management with high intensity statin therapy, aspirin and Brilinta.      Conditions addressed while inpatient are as stated below    Non-ST elevation MI  Status post cardiac catheterization with drug-eluting stent placement from obtuse marginal branch to proximal circumflex.  8/15/2021  Continue medical management-on aspirin, Brilinta and statin  Patient to follow-up with a cardiologist in 1 month        Dyslipidemia ... on statin.      Condition at discharge-stable    DISCHARGE  Follow Up Recommendations for labs and diagnostics:      Reasons For Change In Medications and Indications for New Medications:      Day of Discharge     Vital Signs:  Temp:  [97.5 °F (36.4 °C)-98.9 °F (37.2 °C)] 98.9 °F (37.2 °C)  Heart Rate:  [60-72] 64  Resp:  [14-16] 15  BP: (101-125)/(64-76) 107/72    Physical Exam:  Physical Exam  Vitals reviewed.   Constitutional:       General: He is not in acute distress.  HENT:      Head: Normocephalic and atraumatic.      Nose: Nose normal.      Mouth/Throat:      Mouth: Mucous membranes are moist.   Eyes:      Extraocular Movements: Extraocular movements intact.      Conjunctiva/sclera: Conjunctivae normal.      Pupils: Pupils are equal, round, and reactive to light.   Cardiovascular:      Rate and Rhythm: Normal rate and regular rhythm.   Pulmonary:      Effort: No respiratory distress.      Breath sounds: Normal breath sounds.   Abdominal:      General: Bowel sounds are normal.      Palpations: Abdomen is soft.      Tenderness: There is no abdominal tenderness.   Musculoskeletal:         General: Normal range of motion.      Cervical back: Neck supple.   Skin:     Comments: Right groin with no hematoma   Neurological:      General: No focal deficit present.      Mental Status: He is alert and oriented to person, place, and time.   Psychiatric:         Mood and Affect: Mood normal.            Pertinent  and/or Most Recent Results     LAB RESULTS:      Lab 08/16/21  0642 08/15/21  0824 08/15/21  0227 08/14/21  1613   WBC 8.60  --  10.40 9.80   HEMOGLOBIN 14.8  --  15.4 16.3   HEMATOCRIT 44.6  --  46.1 48.6   PLATELETS 164  --  192 266   NEUTROS ABS  --   --  7.90* 5.30   LYMPHS ABS  --   --  1.90 3.50*   MONOS ABS  --   --  0.60 0.60   EOS ABS  --   --  0.10 0.30   MCV 85.7  --  84.6 84.1   PROTIME  --   --   --  10.8   APTT  --  47.3* 29.7* 21.8*         Lab 08/17/21  0411 08/16/21  0642 08/15/21  0227 08/14/21  1613   SODIUM 137 138 138 139   POTASSIUM 4.1 4.2 4.4 3.6    CHLORIDE 103 105 105 104   CO2 24.0 25.0 26.0 20.0*   ANION GAP 10.0 8.0 7.0 15.0   BUN 22 15 18 21   CREATININE 0.84 0.85 0.78 0.92   GLUCOSE 109* 122* 131* 148*   CALCIUM 8.8 8.7 9.1 9.2   MAGNESIUM  --  2.0 1.9  --    HEMOGLOBIN A1C  --   --   --  6.0*         Lab 08/14/21  1613   TOTAL PROTEIN 7.1   ALBUMIN 4.00   GLOBULIN 3.1   ALT (SGPT) 36   AST (SGOT) 25   BILIRUBIN 0.4   ALK PHOS 74         Lab 08/16/21  0642 08/15/21  0227 08/14/21  1919 08/14/21  1613   TROPONIN T 2.130* 2.110* 0.028 <0.010   PROTIME  --   --   --  10.8   INR  --   --   --  0.97         Lab 08/16/21  0642 08/14/21  1919   CHOLESTEROL 176 207*   LDL CHOL 116* 150*   HDL CHOL 38* 38*   TRIGLYCERIDES 120 106             Brief Urine Lab Results     None        Microbiology Results (last 10 days)     Procedure Component Value - Date/Time    COVID PRE-OP / PRE-PROCEDURE SCREENING ORDER (NO ISOLATION) - Swab, Nasopharynx [[id number]]  (Normal) Collected: 08/14/21 1616    Lab Status: Final result Specimen: Swab from Nasopharynx Updated: 08/14/21 1639    Narrative:      The following orders were created for panel order COVID PRE-OP / PRE-PROCEDURE SCREENING ORDER (NO ISOLATION) - Swab, Nasopharynx.  Procedure                               Abnormality         Status                     ---------                               -----------         ------                     COVID-19,CEPHEID/DANE/BD...[[id number]]  Normal              Final result                 Please view results for these tests on the individual orders.    COVID-19,CEPHEID/DANE/BDMAX,COR/EMMANUEL/PAD/BRENDA IN-HOUSE(OR EMERGENT/ADD-ON),NP SWAB IN TRANSPORT MEDIA 3-4 HR TAT, RT-PCR - Swab, Nasopharynx [[id number]]  (Normal) Collected: 08/14/21 1616    Lab Status: Final result Specimen: Swab from Nasopharynx Updated: 08/14/21 1639     COVID19 Not Detected    Narrative:      Fact sheet for providers: https://www.fda.gov/media/204402/download     Fact sheet for patients:  https://www.fda.gov/media/878965/download  Fact sheet for providers: https://www.fda.gov/media/864565/download    Fact sheet for patients: https://www.fda.gov/media/496354/download    Test performed by PCR.          Adult Transthoracic Echo Complete W/ Cont if Necessary Per Protocol    Addendum Date: 8/15/2021    · Estimated left ventricular EF was in agreement with the calculated left ventricular EF. Left ventricular ejection fraction appears to be 51 - 55%. Left ventricular systolic function is normal. · Left ventricular diastolic function was normal. · Estimated right ventricular systolic pressure from tricuspid regurgitation is normal (<35 mmHg).  Overall EF 55% Regional abnormalities noted with hypokinesis of the lateral wall mild to moderate Normal diastolic function by criteria Normal atrial sizes No significant valvular abnormality identified Normal PA systolic pressure No masses or effusions     XR Chest 1 View    Result Date: 8/14/2021  Impression: No acute cardiopulmonary process.  Electronically Signed By-Irina Freeman MD On:8/14/2021 4:52 PM This report was finalized on [id number] by  Irina Freeman MD.    CT Chest Pulmonary Embolism    Result Date: 8/14/2021  Impression:  1. No evidence of pulmonary embolus. 2. No acute cardiopulmonary process. 3. Ancillary findings as described above.     Electronically Signed By-Irina Freeman MD On:8/14/2021 5:39 PM This report was finalized on [id number] by  Irina Freeman MD.              Results for orders placed during the hospital encounter of 08/14/21    Adult Transthoracic Echo Complete W/ Cont if Necessary Per Protocol    Interpretation Summary  · Estimated left ventricular EF was in agreement with the calculated left ventricular EF. Left ventricular ejection fraction appears to be 51 - 55%. Left ventricular systolic function is normal.  · Left ventricular diastolic function was normal.  · Estimated right ventricular systolic pressure from tricuspid  regurgitation is normal (<35 mmHg).    Overall EF 55%  Regional abnormalities noted with hypokinesis of the lateral wall mild to moderate  Normal diastolic function by criteria  Normal atrial sizes  No significant valvular abnormality identified  Normal PA systolic pressure  No masses or effusions      Labs Pending at Discharge:      Procedures Performed  Procedure(s):  Left Heart Cath  Percutaneous Coronary Intervention         Consults:   Consults     Date and Time Order Name Status Description    8/14/2021  6:02 PM Hospitalist (on-call MD unless specified) Completed     8/14/2021  4:26 PM Cardiology (on-call MD unless specified) Completed             Discharge Details        Discharge Medications      New Medications      Instructions Start Date   aspirin 81 MG EC tablet   81 mg, Oral, Daily      atorvastatin 40 MG tablet  Commonly known as: LIPITOR   40 mg, Oral, Nightly      ticagrelor 90 MG tablet tablet  Commonly known as: BRILINTA   90 mg, Oral, 2 Times Daily             No Known Allergies      Discharge Disposition:  Home or Self Care    Diet:  Hospital:  Diet Order   Procedures   • Diet Cardiac; Healthy Heart         Discharge Activity:   Activity Instructions     Gradually Increase Activity Until at Pre-Hospitalization Level              CODE STATUS:  Code Status and Medical Interventions:   Ordered at: 08/15/21 1038     Level Of Support Discussed With:    Patient     Code Status:    CPR     Medical Interventions (Level of Support Prior to Arrest):    Full         No future appointments.    Additional Instructions for the Follow-ups that You Need to Schedule     Discharge Follow-up with PCP   As directed       Currently Documented PCP:    Flavia Duggan APRN    PCP Phone Number:    [phone]     Follow Up Details: Follow-up with PCP as needed         Discharge Follow-up with Specified Provider: Follow-up with cardiologist in 2 to 4 weeks   As directed      To: Follow-up with cardiologist in 2 to 4  weeks               Time spent on Discharge including face to face service:  34 minutes    This patient has been examined with appropriate PPE . 08/17/21      Signature: Electronically signed by Stephen Huggins MD, 08/17/21, 1:02 PM EDT.

## 2021-08-17 NOTE — PROGRESS NOTES
Cardiology progress note  Miky Cruz MD, PhD      Patient Care Team:  Flavia Duggan APRN as PCP - General (Nurse Practitioner)    CHIEF COMPLAINT: Chest pain concerning for acute MI    HISTORY OF PRESENT ILLNESS:    This is a 66-year-old gentleman with no previous cardiac history, family history of CAD in his father, previous long-term and current smoker who presented to the ER with bilateral arm pain shoulder pain chest pain appearing diaphoretic dizzy who called EMS, EKG revealed very subtle abnormalities in the inferior leads but by the time the patient reached the hospital he overall was much improved with some residual left arm pain, troponin was initially unremarkable, patient was already ruled out for PE and aortic dissection with CTA of the chest and therefore was started on heparin in addition to empiric loading with P2 Y 12 inhibitor with Plavix.  I saw the patient he still had some left arm pain but EKG again did not reveal any ST elevation or depression significantly.  He says he has some musculoskeletal pain on the right which is always present.  No other fevers chill sweats nausea vomiting or diarrhea  =================================================================  Seen and examined,  No acute events overnight  Hemodynamically likely stable  Discussed cath results      Continue Brilinta twice daily in combination with aspirin  Titrate goal-directed medical therapy as needed by and allowed by hemodynamics  Acute MI    Okay for home today  Follow cardiology 2 to 4 weeks    Review of systems negative x14 point review of systems except was mentioned above    Historical data copied forward from previous encounters in EMR is unchanged    Risk of left her catheterization including death heart attack stroke pain bleeding infection as well as others was discussed with the patient he wishes to proceed with left her catheterization ultimately    Risk and benefits of cardiac medicines with necessity  "of each discussed including P2 Y 12 inhibitors and blood thinners          Past Medical History:   Diagnosis Date   • Kidney stone      Past Surgical History:   Procedure Laterality Date   • CARDIAC CATHETERIZATION N/A 8/15/2021    Procedure: Left Heart Cath;  Surgeon: Miky Cruz MD;  Location: [facility] CATH INVASIVE LOCATION;  Service: Cardiology;  Laterality: N/A;   • CARDIAC CATHETERIZATION N/A 8/15/2021    Procedure: Percutaneous Coronary Intervention;  Surgeon: Miky Cruz MD;  Location: [facility] CATH INVASIVE LOCATION;  Service: Cardiology;  Laterality: N/A;     Family History   Problem Relation Age of Onset   • Other Mother    • Other Sister    • Other Brother    • Heart attack Father      Social History     Tobacco Use   • Smoking status: Current Every Day Smoker     Packs/day: 0.25   • Smokeless tobacco: Never Used   Substance Use Topics   • Alcohol use: Not Currently   • Drug use: Not Currently     No medications prior to admission.     Allergies:  Patient has no known allergies.    REVIEW OF SYSTEMS:  Please see the above history of present illness for pertinent positives and negatives.  The remainder of the patient's systems have been reviewed and are negative.    Vital Signs  Temp:  [97.5 °F (36.4 °C)-98.9 °F (37.2 °C)] 98.9 °F (37.2 °C)  Heart Rate:  [60-72] 64  Resp:  [14-16] 15  BP: (101-125)/(64-76) 107/72    Flowsheet Rows      First Filed Value   Admission Height  190.5 cm (75\") Documented at 08/14/2021 1555   Admission Weight  102 kg (225 lb) Documented at 08/14/2021 1555           Physical Exam:  Physical Exam   Constitutional: Patient appears well-developed and well-nourished and in no acute distress   HEENT:   Head: Normocephalic and atraumatic.   Eyes:  Pupils are equal, round, and reactive to light. EOM are intact. Sclerae are anicteric and noninjected.  Mouth and Throat: Patient has moist mucous membranes. Oropharynx is clear of any erythema or exudate.     Neck: Neck " supple. No JVD present. No thyromegaly present. No lymphadenopathy present.  Cardiovascular: Regular rate, regular rhythm, S1 normal and S2 normal.  Exam reveals no gallop and no friction rub.  No murmur heard.  Pulmonary/Chest: Lungs are clear to auscultation bilaterally. No respiratory distress. No wheezes. No rhonchi. No rales.   Abdominal: Soft. Bowel sounds are normal. No distension and no mass. There is no hepatosplenomegaly. There is no tenderness.   Musculoskeletal: Normal muscle tone  Extremities: No edema. Pulses are palpable in all 4 extremities.  Neurological: Patient is alert and oriented to person, place, and time. Cranial nerves II-XII are grossly intact with no focal deficits.  Skin: Skin is warm. No rash noted. Nails show no clubbing.  No cyanosis or erythema.  Unchanged from prior encounter  Groin site stable  Otherwise exam unchanged     Results Review:    I reviewed the patient's new clinical results.  Lab Results (most recent)     Procedure Component Value Units Date/Time    POC Activated Clotting Time [[id number]]  (Abnormal) Collected: 08/15/21 1020    Specimen: Arterial Blood Updated: 08/15/21 1028     Activated Clotting Time  246 Seconds      Comment: Serial Number: [id number]:  [id number]       POC Activated Clotting Time [[id number]]  (Abnormal) Collected: 08/15/21 0958    Specimen: Arterial Blood Updated: 08/15/21 1028     Activated Clotting Time  219 Seconds      Comment: Serial Number: [id number]:  [id number]       aPTT [[id number]]  (Abnormal) Collected: 08/15/21 0824    Specimen: Blood Updated: 08/15/21 0845     PTT 47.3 seconds     POC Glucose Once [[id number]]  (Abnormal) Collected: 08/15/21 0805    Specimen: Blood Updated: 08/15/21 0806     Glucose 114 mg/dL      Comment: Serial Number: [id number]:  [id number]       Troponin [[id number]]  (Abnormal) Collected: 08/15/21 0227    Specimen: Blood Updated: 08/15/21 0256     Troponin T 2.110 ng/mL     Narrative:      Troponin T  Reference Range:  <= 0.03 ng/mL-   Negative for AMI  >0.03 ng/mL-     Abnormal for myocardial necrosis.  Clinicians would have to utilize clinical acumen, EKG, Troponin and serial changes to determine if it is an Acute Myocardial Infarction or myocardial injury due to an underlying chronic condition.       Results may be falsely decreased if patient taking Biotin.      Basic Metabolic Panel [[id number]]  (Abnormal) Collected: 08/15/21 0227    Specimen: Blood Updated: 08/15/21 0252     Glucose 131 mg/dL      BUN 18 mg/dL      Creatinine 0.78 mg/dL      Sodium 138 mmol/L      Potassium 4.4 mmol/L      Chloride 105 mmol/L      CO2 26.0 mmol/L      Calcium 9.1 mg/dL      eGFR Non African Amer 100 mL/min/1.73      BUN/Creatinine Ratio 23.1     Anion Gap 7.0 mmol/L     Narrative:      GFR Normal >60  Chronic Kidney Disease <60  Kidney Failure <15      Magnesium [[id number]]  (Normal) Collected: 08/15/21 0227    Specimen: Blood Updated: 08/15/21 0252     Magnesium 1.9 mg/dL     aPTT [[id number]]  (Abnormal) Collected: 08/15/21 0227    Specimen: Blood Updated: 08/15/21 0247     PTT 29.7 seconds     CBC & Differential [[id number]]  (Abnormal) Collected: 08/15/21 0227    Specimen: Blood Updated: 08/15/21 0233    Narrative:      The following orders were created for panel order CBC & Differential.  Procedure                               Abnormality         Status                     ---------                               -----------         ------                     CBC Auto Differential[[id number]]        Abnormal            Final result                 Please view results for these tests on the individual orders.    CBC Auto Differential [[id number]]  (Abnormal) Collected: 08/15/21 0227    Specimen: Blood Updated: 08/15/21 0233     WBC 10.40 10*3/mm3      RBC 5.45 10*6/mm3      Hemoglobin 15.4 g/dL      Hematocrit 46.1 %      MCV 84.6 fL      MCH 28.2 pg      MCHC 33.3 g/dL      RDW 14.6 %      RDW-SD 43.3 fl      MPV 8.2 fL       Platelets 192 10*3/mm3      Neutrophil % 75.6 %      Lymphocyte % 17.8 %      Monocyte % 5.4 %      Eosinophil % 0.5 %      Basophil % 0.7 %      Neutrophils, Absolute 7.90 10*3/mm3      Lymphocytes, Absolute 1.90 10*3/mm3      Monocytes, Absolute 0.60 10*3/mm3      Eosinophils, Absolute 0.10 10*3/mm3      Basophils, Absolute 0.10 10*3/mm3      nRBC 0.1 /100 WBC     Lipid Panel [[id number]]  (Abnormal) Collected: 08/14/21 1919    Specimen: Blood Updated: 08/14/21 2125     Total Cholesterol 207 mg/dL      Triglycerides 106 mg/dL      HDL Cholesterol 38 mg/dL      LDL Cholesterol  150 mg/dL      VLDL Cholesterol 19 mg/dL      LDL/HDL Ratio 3.89    Narrative:      Cholesterol Reference Ranges  (U.S. Department of Health and Human Services ATP III Classifications)    Desirable          <200 mg/dL  Borderline High    200-239 mg/dL  High Risk          >240 mg/dL      Triglyceride Reference Ranges  (U.S. Department of Health and Human Services ATP III Classifications)    Normal           <150 mg/dL  Borderline High  150-199 mg/dL  High             200-499 mg/dL  Very High        >500 mg/dL    HDL Reference Ranges  (U.S. Department of Health and Human Services ATP III Classifcations)    Low     <40 mg/dl (major risk factor for CHD)  High    >60 mg/dl ('negative' risk factor for CHD)        LDL Reference Ranges  (U.S. Department of Health and Human Services ATP III Classifcations)    Optimal          <100 mg/dL  Near Optimal     100-129 mg/dL  Borderline High  130-159 mg/dL  High             160-189 mg/dL  Very High        >189 mg/dL    Hemoglobin A1c [[id number]] Collected: 08/14/21 1613    Specimen: Blood Updated: 08/14/21 2115    Troponin [[id number]]  (Normal) Collected: 08/14/21 1919    Specimen: Blood Updated: 08/14/21 1945     Troponin T 0.028 ng/mL     Narrative:      Troponin T Reference Range:  <= 0.03 ng/mL-   Negative for AMI  >0.03 ng/mL-     Abnormal for myocardial necrosis.  Clinicians would have to utilize  clinical acumen, EKG, Troponin and serial changes to determine if it is an Acute Myocardial Infarction or myocardial injury due to an underlying chronic condition.       Results may be falsely decreased if patient taking Biotin.      Extra Tubes [[id number]] Collected: 08/14/21 1615    Specimen: Blood, Venous Line Updated: 08/14/21 1705    Narrative:      The following orders were created for panel order Extra Tubes.  Procedure                               Abnormality         Status                     ---------                               -----------         ------                     Gold Top - SST[[id number]]                                   Final result                 Please view results for these tests on the individual orders.    Gold Top - SST [[id number]] Collected: 08/14/21 1615    Specimen: Blood Updated: 08/14/21 1705    D-dimer, Quantitative [[id number]]  (Abnormal) Collected: 08/14/21 1613    Specimen: Blood Updated: 08/14/21 1658     D-Dimer, Quantitative 0.75 mg/L (FEU)     Narrative:      Reference Range  --------------------------------------------------------------------     < 0.50   Negative Predictive Value  0.50-0.59   Indeterminate    >= 0.60   Probable VTE             A very low percentage of patients with DVT may yield D-Dimer results   below the cut-off of 0.50 mg/L FEU.  This is known to be more   prevalent in patients with distal DVT.             Results of this test should always be interpreted in conjunction with   the patient's medical history, clinical presentation and other   findings.  Clinical diagnosis should not be based on the result of   [place] D-Dimer alone.    Comprehensive Metabolic Panel [[id number]]  (Abnormal) Collected: 08/14/21 1613    Specimen: Blood Updated: 08/14/21 1648     Glucose 148 mg/dL      BUN 21 mg/dL      Creatinine 0.92 mg/dL      Sodium 139 mmol/L      Potassium 3.6 mmol/L      Chloride 104 mmol/L      CO2 20.0 mmol/L      Calcium 9.2 mg/dL      Total  Protein 7.1 g/dL      Albumin 4.00 g/dL      ALT (SGPT) 36 U/L      AST (SGOT) 25 U/L      Alkaline Phosphatase 74 U/L      Total Bilirubin 0.4 mg/dL      eGFR Non African Amer 82 mL/min/1.73      Globulin 3.1 gm/dL      A/G Ratio 1.3 g/dL      BUN/Creatinine Ratio 22.8     Anion Gap 15.0 mmol/L     Narrative:      GFR Normal >60  Chronic Kidney Disease <60  Kidney Failure <15      COVID PRE-OP / PRE-PROCEDURE SCREENING ORDER (NO ISOLATION) - Swab, Nasopharynx [[id number]]  (Normal) Collected: 08/14/21 1616    Specimen: Swab from Nasopharynx Updated: 08/14/21 1639    Narrative:      The following orders were created for panel order COVID PRE-OP / PRE-PROCEDURE SCREENING ORDER (NO ISOLATION) - Swab, Nasopharynx.  Procedure                               Abnormality         Status                     ---------                               -----------         ------                     COVID-19,CEPHEID/DANE/BD...[[id number]]  Normal              Final result                 Please view results for these tests on the individual orders.    COVID-19,CEPHEID/DANE/BDMAX,COR/[facility]MANUEL/PAD/BRENDA IN-HOUSE(OR EMERGENT/ADD-ON),NP SWAB IN TRANSPORT MEDIA 3-4 HR TAT, RT-PCR - Swab, Nasopharynx [[id number]]  (Normal) Collected: 08/14/21 1616    Specimen: Swab from Nasopharynx Updated: 08/14/21 1639     COVID19 Not Detected    Narrative:      Fact sheet for providers: https://www.fda.gov/media/455545/download     Fact sheet for patients: https://www.fda.gov/media/587441/download  Fact sheet for providers: https://www.fda.gov/media/635737/download    Fact sheet for patients: https://www.fda.gov/media/893134/download    Test performed by PCR.    Protime-INR [[id number]]  (Normal) Collected: 08/14/21 1613    Specimen: Blood Updated: 08/14/21 1631     Protime 10.8 Seconds      INR 0.97    CBC & Differential [[id number]]  (Abnormal) Collected: 08/14/21 1613    Specimen: Blood Updated: 08/14/21 1621    Narrative:      The following orders were created  for panel order CBC & Differential.  Procedure                               Abnormality         Status                     ---------                               -----------         ------                     CBC Auto Differential[[id number]]        Abnormal            Final result                 Please view results for these tests on the individual orders.    CBC Auto Differential [[id number]]  (Abnormal) Collected: 08/14/21 1613    Specimen: Blood Updated: 08/14/21 1621     WBC 9.80 10*3/mm3      RBC 5.78 10*6/mm3      Hemoglobin 16.3 g/dL      Hematocrit 48.6 %      MCV 84.1 fL      MCH 28.2 pg      MCHC 33.6 g/dL      RDW 14.9 %      RDW-SD 44.2 fl      MPV 8.5 fL      Platelets 266 10*3/mm3      Neutrophil % 54.4 %      Lymphocyte % 35.8 %      Monocyte % 5.9 %      Eosinophil % 2.6 %      Basophil % 1.3 %      Neutrophils, Absolute 5.30 10*3/mm3      Lymphocytes, Absolute 3.50 10*3/mm3      Monocytes, Absolute 0.60 10*3/mm3      Eosinophils, Absolute 0.30 10*3/mm3      Basophils, Absolute 0.10 10*3/mm3      nRBC 0.1 /100 WBC           Imaging Results (Most Recent)     Procedure Component Value Units Date/Time    CT Chest Pulmonary Embolism [[id number]] Collected: 08/14/21 1737     Updated: 08/14/21 1751    Narrative:      CT CHEST PULMONARY EMBOLISM-     Date of Exam: 8/14/2021 5:26 PM     Indication: chest pain/back pain.     Comparison: Radiograph 08/14/2021     Technique: Serial and axial CT images of the chest were obtained  following the uneventful intravenous administration of 100 cc of  Isovue-370 contrast. Reconstructions in the coronal and sagittal planes  were also performed.  Automated exposure control and iterative  reconstruction methods were used.     FINDINGS:     Pulmonary Arteries: Excellent contrast opacification of the pulmonary  arteries.  No intraluminal filling defects to suggest pulmonary embolus.   The pulmonary arteries are normal in caliber.     Hilum and Mediastinum: No  pathologically enlarged lymph nodes.  Normal  heart size.  No significant coronary artery atherosclerotic disease.  Unremarkable thoracic aorta.   No pericardial effusion.     Lung Parenchyma and Pleura: No focal consolidations.  No suspicious  pulmonary nodules.  No endobronchial lesions.  No significant pleural  effusions.     Upper Abdomen: Unremarkable.     Soft tissues: Unremarkable.     Osseous structures: No aggressive focal lytic or sclerotic osseous  lesions.       Impression:         1. No evidence of pulmonary embolus.  2. No acute cardiopulmonary process.  3. Ancillary findings as described above.              Electronically Signed By-Irina Freeman MD On:8/14/2021 5:39 PM  This report was finalized on [id number] by  Irina Freeman MD.    XR Chest 1 View [[id number]] Collected: 08/14/21 1652     Updated: 08/14/21 1654    Narrative:      Examination: XR CHEST 1 VW-     Date of Exam: 8/14/2021 4:30 PM     Indication: chest pain.     Comparison: None available.     Technique: 1 view of the chest      Findings:  There are no airspace consolidations. No pleural effusions. No  pneumothorax. The heart size is normal. The pulmonary vasculature  appears within normal limits. No acute osseous abnormality identified.       Impression:      No acute cardiopulmonary process.     Electronically Signed By-Irina Freeman MD On:8/14/2021 4:52 PM  This report was finalized on [id number] by  Irina Freeman MD.        reviewed    ECG/EMG Results (most recent)     Procedure Component Value Units Date/Time    ECG 12 Lead [[id number]] Collected: 08/14/21 1556     Updated: 08/15/21 0652     QT Interval 426 ms     Narrative:      HEART RATE= 59  bpm  RR Interval= 1012  ms  OR Interval= 166  ms  P Horizontal Axis= 16  deg  P Front Axis= 52  deg  QRSD Interval= 94  ms  QT Interval= 426  ms  QRS Axis= 10  deg  T Wave Axis= 50  deg  - ABNORMAL ECG -  Sinus bradycardia  ST elevation, consider inferior injury  No previous ECG available  for comparison  Electronically Signed By: Elie Melo (EMMANUEL) 15-Aug-2021 06:52:17  Date and Time of Study: 2021-08-14 15:56:21    Adult Transthoracic Echo Complete W/ Cont if Necessary Per Protocol [[id number]] Collected: 08/15/21 0751     Updated: 08/15/21 1850     BSA 2.3 m^2      RVIDd 1.7 cm      IVSd 0.9 cm      LVIDd 5.7 cm      LVIDs 3.6 cm      LVPWd 1.1 cm      IVS/LVPW 0.82     FS 36.8 %      EDV(Teich) 162.4 ml      ESV(Teich) 55.3 ml      EF(Teich) 66.0 %      EDV(cubed) 188.7 ml      ESV(cubed) 47.6 ml      EF(cubed) 74.8 %      LV mass(C)d 226.3 grams      LV mass(C)dI 100.0 grams/m^2      SV(Teich) 107.1 ml      SI(Teich) 47.3 ml/m^2      SV(cubed) 141.2 ml      SI(cubed) 62.4 ml/m^2      Ao root diam 3.7 cm      Ao root area 10.7 cm^2      ACS 1.9 cm      asc Aorta Diam 3.5 cm      LVOT diam 2.3 cm      LVOT area 4.1 cm^2      EDV(MOD-sp4) 97.1 ml      ESV(MOD-sp4) 43.4 ml      EF(MOD-sp4) 55.2 %      SV(MOD-sp4) 53.6 ml      SI(MOD-sp4) 23.7 ml/m^2      Ao root area (BSA corrected) 1.6     LV Petty Vol (BSA corrected) 42.9 ml/m^2      LV Sys Vol (BSA corrected) 19.2 ml/m^2      Aortic R-R 1.0 sec      Aortic HR 57.7 BPM      MV E max deneen 51.6 cm/sec      MV A max deneen 55.8 cm/sec      MV E/A 0.92     MV V2 max 64.3 cm/sec      MV max PG 1.7 mmHg      MV V2 mean 40.3 cm/sec      MV mean PG 0.75 mmHg      MV V2 VTI 22.7 cm      MVA(VTI) 3.5 cm^2      MV dec slope 201.2 cm/sec^2      MV dec time 0.26 sec      Ao pk deneen 96.3 cm/sec      Ao max PG 3.7 mmHg      Ao max PG (full) -0.05 mmHg      Ao V2 mean 64.2 cm/sec      Ao mean PG 1.9 mmHg      Ao mean PG (full) 0.19 mmHg      Ao V2 VTI 18.6 cm      SAMMI(I,A) 4.2 cm^2      SAMMI(I,D) 4.2 cm^2      SAMMI(V,A) 4.1 cm^2      SAMMI(V,D) 4.1 cm^2      LV V1 max PG 3.8 mmHg      LV V1 mean PG 1.7 mmHg      LV V1 max 97.0 cm/sec      LV V1 mean 59.4 cm/sec      LV V1 VTI 19.2 cm      CO(Ao) 11.5 l/min      CI(Ao) 5.1 l/min/m^2      SV(Ao) 199.0 ml      SI(Ao) 88.0  ml/m^2      CO(LVOT) 4.5 l/min      CI(LVOT) 2.0 l/min/m^2      SV(LVOT) 78.3 ml      SI(LVOT) 34.6 ml/m^2      PA V2 max 86.7 cm/sec      PA max PG 3.0 mmHg      PA max PG (full) 1.3 mmHg      PA V2 mean 66.1 cm/sec      PA mean PG 1.9 mmHg      PA mean PG (full) 1.1 mmHg      PA V2 VTI 18.9 cm      PA acc time 0.1 sec      RV V1 max PG 1.7 mmHg      RV V1 mean PG 0.8 mmHg      RV V1 max 65.0 cm/sec      RV V1 mean 41.5 cm/sec      RV V1 VTI 13.7 cm      TR max deneen 178.4 cm/sec      RVSP(TR) 15.7 mmHg      RAP systole 3.0 mmHg      PA pr(Accel) 32.8 mmHg       CV ECHO SAI - BZI_BMI 26.9 kilograms/m^2       CV ECHO SAI - BSA(HAYCOCK) 2.3 m^2       CV ECHO SAI - BZI_METRIC_WEIGHT 97.5 kg       CV ECHO SAI - BZI_METRIC_HEIGHT 190.5 cm      EF(MOD-bp) 55.0 %      LA dimension(2D) 4.4 cm     Addenda:          · Estimated left ventricular EF was in agreement with the calculated left   ventricular EF. Left ventricular ejection fraction appears to be 51 - 55%.   Left ventricular systolic function is normal.  · Left ventricular diastolic function was normal.  · Estimated right ventricular systolic pressure from tricuspid   regurgitation is normal (<35 mmHg).     Overall EF 55%  Regional abnormalities noted with hypokinesis of the lateral wall mild to   moderate  Normal diastolic function by criteria  Normal atrial sizes  No significant valvular abnormality identified  Normal PA systolic pressure  No masses or effusions      Signed: 08/15/21 1850 by Miky Cruz MD    Narrative:      · Estimated left ventricular EF was in agreement with the calculated left   ventricular EF. Left ventricular ejection fraction appears to be 51 - 55%.   Left ventricular systolic function is normal.  · Left ventricular diastolic function was normal.  · Estimated right ventricular systolic pressure from tricuspid   regurgitation is normal (<35 mmHg).       ECG 12 Lead [[id number]] Collected: 08/16/21 0557     Updated:  08/16/21 0559     QT Interval 404 ms     Narrative:      HEART RATE= 63  bpm  RR Interval= 952  ms  RI Interval= 152  ms  P Horizontal Axis= -12  deg  P Front Axis= 58  deg  QRSD Interval= 103  ms  QT Interval= 404  ms  QRS Axis= -13  deg  T Wave Axis= 65  deg  - ABNORMAL ECG -  Sinus rhythm  Incomplete right bundle branch block  Left anterior fascicular block  When compared with ECG of 14-Aug-2021 15:56:21,  New conduction abnormality  Electronically Signed By:   Date and Time of Study: 2021-08-16 05:57:56    ECG 12 Lead [[id number]] Collected: 08/14/21 1608     Updated: 08/16/21 1648     QT Interval 414 ms     Narrative:      HEART RATE= 62  bpm  RR Interval= 968  ms  RI Interval= 149  ms  P Horizontal Axis= 17  deg  P Front Axis= 39  deg  QRSD Interval= 99  ms  QT Interval= 414  ms  QRS Axis= 3  deg  T Wave Axis= 51  deg  - ABNORMAL ECG -  Sinus rhythm  ST elevation, consider inferior injury  Electronically Signed By:   Date and Time of Study: 2021-08-14 16:08:00        reviewed  EKG reviewed and interpreted by me demonstrates sinus rhythm with nonspecific ST-T wave abnormalities in the inferolateral leads      Assessment/Plan     Non-ST elevation myocardial infarction  Overall EF preserved 55% with lateral wall hypokinesis in setting of acute MI    Status post OM circumflex revascularization successfully  Continue aspirin Brilinta  If remains stable chest pain-free likely home tomorrow  Titrate goal-directed medical therapy as allowed by hemodynamics  High intensity statin therapy  Beta-blocker      Miky Cruz MD, PhD    Home today okay  Antiplatelet therapy discussed  Restrictions discussed  Off work for 5 to 7 days at least  Cardiac rehab will be referred in 30 days  Follow-up cardiology 2 weeks    Miky Cruz MD, PhD          I discussed the patient's findings and my recommendations with patient and staff and wife at bedside    Miky Cruz MD  08/17/21  14:19 EDT

## 2021-08-17 NOTE — OUTREACH NOTE
Prep Survey      Responses   [facility] patient discharged from?  [facility]   Is LACE score < 7 ?  Yes   Emergency Room discharge w/ pulse ox?  No   Eligibility  [facility]   Date of Admission  08/14/21   Date of Discharge  08/17/21   Discharge Disposition  Home or Self Care   Discharge diagnosis  CARDIAC CATHETERIZATION  Unstable angina    Does the patient have one of the following disease processes/diagnoses(primary or secondary)?  Other   Does the patient have Home health ordered?  No   Is there a DME ordered?  No   Prep survey completed?  Yes          Larisa Turner RN

## 2021-08-18 ENCOUNTER — TELEPHONE (OUTPATIENT)
Dept: INTERNAL MEDICINE | Facility: HOSPITAL | Age: 66
End: 2021-08-18

## 2021-08-18 ENCOUNTER — TRANSITIONAL CARE MANAGEMENT TELEPHONE ENCOUNTER (OUTPATIENT)
Dept: CALL CENTER | Facility: HOSPITAL | Age: 66
End: 2021-08-18

## 2021-08-18 LAB — QT INTERVAL: 404 MS

## 2021-08-18 NOTE — OUTREACH NOTE
Call Center TCM Note      Responses   [facility] patient discharged from?  [facility]   Does the patient have one of the following disease processes/diagnoses(primary or secondary)?  Other   TCM attempt successful?  No   Unsuccessful attempts  Attempt 1          Larisa Lake RN    8/18/2021, 12:35 EDT

## 2021-08-18 NOTE — SIGNIFICANT NOTE
Case Management Discharge Note      Final Note: (P) d/c home         Selected Continued Care - Discharged on 8/17/2021 Admission date: 8/14/2021 - Discharge disposition: Home or Self Care              Final Discharge Disposition Code: (P) 01 - home or self-care

## 2021-08-18 NOTE — TELEPHONE ENCOUNTER
Called and LM for patient to reach out to Dr Cruz's office regarding Brillinta. Medication is over $500 for patient.     I have sent a secure chat and a in basket message to Dr. Cruz.    Vivi

## 2021-08-18 NOTE — OUTREACH NOTE
Call Center TCM Note      Responses   [facility] patient discharged from?  [facility]   Does the patient have one of the following disease processes/diagnoses(primary or secondary)?  Other   TCM attempt successful?  No   Unsuccessful attempts  Attempt 2          Larisa Lake RN    8/18/2021, 13:27 EDT

## 2021-08-19 ENCOUNTER — TRANSITIONAL CARE MANAGEMENT TELEPHONE ENCOUNTER (OUTPATIENT)
Dept: CALL CENTER | Facility: HOSPITAL | Age: 66
End: 2021-08-19

## 2021-08-19 RX ORDER — PRASUGREL 10 MG/1
TABLET, FILM COATED ORAL
Qty: 35 TABLET | Refills: 5 | Status: SHIPPED | OUTPATIENT
Start: 2021-08-19 | End: 2021-08-23 | Stop reason: SDUPTHER

## 2021-08-19 NOTE — OUTREACH NOTE
Call Center TCM Note      Responses   [facility] patient discharged from?  [facility]   Does the patient have one of the following disease processes/diagnoses(primary or secondary)?  Other   TCM attempt successful?  No   Unsuccessful attempts  Attempt 3   Wrap up additional comments  Unable to reach pt x 3 attempts for TCM call. Pt is sched with PCP Flavia Duggan on 08/23/2021, which can serve as TCM FWP if provider wishes to document accordingly.           Larisa Lizama MA    8/19/2021, 16:40 EDT

## 2021-08-22 NOTE — PROGRESS NOTES
"Transitional Care Follow Up Visit  Subjective     Gil Garcia is a 66 y.o. male who presents for a transitional care management visit.    Within 48 business hours after discharge our office contacted him via telephone to coordinate his care and needs.      I reviewed and discussed the details of that call along with the discharge summary, hospital problems, inpatient lab results, inpatient diagnostic studies, and consultation reports with Gil.     Current outpatient and discharge medications have been reconciled for the patient.  Reviewed by: JIGNA Burns      Date of TCM Phone Call 8/17/2021   [facility]   Date of Admission 8/14/2021   Date of Discharge 8/17/2021   Discharge Disposition Home or Self Care     Risk for Readmission (LACE) Score: 6 (8/17/2021  6:01 AM)      History of Present Illness   Course During Hospital Stay:  Hospital course per hospital records: \"66-year-old man with no significant past medical history, presented to [facility] ED on 8/14/2021 with complaints of midsternal chest pain radiating to the back and both arms.  Pain was of moderate intensity and sharp in nature.  Had associated shortness of breath, diaphoresis and dizziness.  No aggravating or relieving factor.  In the ED CT PE protocol done no evidence of PE or aortic dissection.  Chest x-ray showed no acute cardiopulmonary abnormality.  EKG showed nonspecific changes in the inferior leads.  Serum troponin was elevated.  Patient was started on aspirin, Plavix and heparin drip.  Cardiologist was consulted and patient was admitted to the hospitalist group.  Patient underwent cardiac catheterization on 8/15/2021 with drug-eluting stents placement from the obtuse marginal branch to proximal circumflex.  Was continued on medical management with high intensity statin therapy, aspirin and Brilinta.  Conditions addressed while inpatient are as stated below  Non-ST elevation MI  Status post cardiac " "catheterization with drug-eluting stent placement from obtuse marginal branch to proximal circumflex.  8/15/2021  Continue medical management-on aspirin, Brilinta and statin  Patient to follow-up with a cardiologist in 1 month.\"      New meds at discharge included, ASA 81 mg daily; atorvastatin 40 mg daily; Brilinta 90 mg bid.   Brilinta was changed to Effient due to cost.    He is to schedule with his cardiologist for follow up in 2-4 weeks.    Denies any Cp; palpitations; SOA; dizziness; headache; trouble with vision.    Concerned today with right shoulder pain.  He reports he fell about a month ago and landed on his right shoulder.  Denies any swelling, redness or bruising.  Has taken otc pain relievers without much relief.  Heat does ease the pain.  He notices the pain increases with raising his arm; eases with rest.       The following portions of the patient's history were reviewed and updated as appropriate: allergies, current medications, past family history, past medical history, past social history, past surgical history and problem list.    Review of Systems   Constitutional: Negative for activity change, appetite change, chills, diaphoresis, fatigue, fever and unexpected weight change.   Eyes: Negative for visual disturbance.   Respiratory: Negative for cough, chest tightness, shortness of breath and wheezing.    Cardiovascular: Negative for chest pain, palpitations and leg swelling.   Gastrointestinal: Negative for abdominal distention, blood in stool, constipation, diarrhea, nausea and vomiting.   Genitourinary: Negative for dysuria, flank pain, frequency, hematuria and urgency.   Musculoskeletal: Positive for arthralgias (right shoulder). Negative for back pain, joint swelling, myalgias and neck pain.   Skin: Negative for rash.   Neurological: Negative for dizziness, weakness, light-headedness, numbness and headaches.   Psychiatric/Behavioral: Negative for dysphoric mood. The patient is not " nervous/anxious.        Objective   Physical Exam  Vitals reviewed.   Constitutional:       General: He is not in acute distress.     Appearance: Normal appearance.   Cardiovascular:      Rate and Rhythm: Normal rate and regular rhythm.      Pulses: Normal pulses.      Heart sounds: Normal heart sounds. No murmur heard.     Pulmonary:      Effort: Pulmonary effort is normal. No respiratory distress.      Breath sounds: Normal breath sounds. No wheezing or rhonchi.   Chest:      Chest wall: No tenderness.   Abdominal:      Tenderness: There is no right CVA tenderness or left CVA tenderness.   Musculoskeletal:      Right shoulder: Tenderness and crepitus present. No swelling, deformity, effusion or bony tenderness. Decreased range of motion. Normal strength.      Right upper arm: No swelling, edema or deformity.      Cervical back: Normal range of motion and neck supple. No tenderness.   Skin:     General: Skin is warm and dry.      Findings: No erythema.   Neurological:      General: No focal deficit present.      Mental Status: He is alert and oriented to person, place, and time.   Psychiatric:         Mood and Affect: Mood normal.         Assessment/Plan   Diagnoses and all orders for this visit:    1. Chest pain, unspecified type (Primary)  Comments:  Stable.   Cont. current medication.   Refills sent.   Schedule follow up with cardiology.   Orders:  -     atorvastatin (LIPITOR) 40 MG tablet; Take 1 tablet by mouth Every Night.  Dispense: 90 tablet; Refill: 3  -     prasugrel (EFFIENT) 10 MG tablet; Take 60mg by mouth on the first day and then take 10mg by mouth daily thereafter.  Dispense: 90 tablet; Refill: 3    2. Acute pain of right shoulder  Comments:  Xray ordered.   Tylenol and heat as needed.    Orders:  -     XR Shoulder 2+ View Right; Future

## 2021-08-23 ENCOUNTER — OFFICE VISIT (OUTPATIENT)
Dept: FAMILY MEDICINE CLINIC | Facility: CLINIC | Age: 66
End: 2021-08-23

## 2021-08-23 ENCOUNTER — TELEPHONE (OUTPATIENT)
Dept: CARDIAC REHAB | Facility: HOSPITAL | Age: 66
End: 2021-08-23

## 2021-08-23 ENCOUNTER — HOSPITAL ENCOUNTER (OUTPATIENT)
Dept: GENERAL RADIOLOGY | Facility: HOSPITAL | Age: 66
Discharge: HOME | End: 2021-08-23
Admitting: NURSE PRACTITIONER

## 2021-08-23 VITALS
OXYGEN SATURATION: 97 % | WEIGHT: 213 LBS | HEIGHT: 75 IN | SYSTOLIC BLOOD PRESSURE: 121 MMHG | DIASTOLIC BLOOD PRESSURE: 83 MMHG | HEART RATE: 84 BPM | BODY MASS INDEX: 26.49 KG/M2 | TEMPERATURE: 98.7 F

## 2021-08-23 DIAGNOSIS — M25.511 ACUTE PAIN OF RIGHT SHOULDER: Primary | ICD-10-CM

## 2021-08-23 DIAGNOSIS — M25.511 ACUTE PAIN OF RIGHT SHOULDER: ICD-10-CM

## 2021-08-23 DIAGNOSIS — R07.9 CHEST PAIN, UNSPECIFIED TYPE: Primary | ICD-10-CM

## 2021-08-23 PROCEDURE — 99495 TRANSJ CARE MGMT MOD F2F 14D: CPT | Performed by: NURSE PRACTITIONER

## 2021-08-23 PROCEDURE — 1111F DSCHRG MED/CURRENT MED MERGE: CPT | Performed by: NURSE PRACTITIONER

## 2021-08-23 PROCEDURE — 73030 X-RAY EXAM OF SHOULDER: CPT

## 2021-08-23 RX ORDER — ATORVASTATIN CALCIUM 40 MG/1
40 TABLET, FILM COATED ORAL NIGHTLY
Qty: 90 TABLET | Refills: 3 | Status: SHIPPED | OUTPATIENT
Start: 2021-08-23 | End: 2022-08-23

## 2021-08-23 RX ORDER — PRASUGREL 10 MG/1
TABLET, FILM COATED ORAL
Qty: 90 TABLET | Refills: 3 | Status: SHIPPED | OUTPATIENT
Start: 2021-08-23 | End: 2023-02-17 | Stop reason: SDUPTHER

## 2021-08-30 ENCOUNTER — TELEPHONE (OUTPATIENT)
Dept: CARDIAC REHAB | Facility: HOSPITAL | Age: 66
End: 2021-08-30

## 2021-08-30 NOTE — TELEPHONE ENCOUNTER
Patient returned call. Says he is interested in doing Cardiac Rehab program, but needs to get an insurance supplement first. He'll call us back when he gets one.

## 2021-09-07 ENCOUNTER — OFFICE VISIT (OUTPATIENT)
Dept: CARDIOLOGY | Facility: CLINIC | Age: 66
End: 2021-09-07

## 2021-09-07 VITALS
HEART RATE: 85 BPM | RESPIRATION RATE: 18 BRPM | DIASTOLIC BLOOD PRESSURE: 72 MMHG | WEIGHT: 214 LBS | SYSTOLIC BLOOD PRESSURE: 120 MMHG | BODY MASS INDEX: 26.61 KG/M2 | HEIGHT: 75 IN

## 2021-09-07 DIAGNOSIS — I10 ESSENTIAL HYPERTENSION: ICD-10-CM

## 2021-09-07 DIAGNOSIS — I25.700 CORONARY ARTERY DISEASE INVOLVING CORONARY BYPASS GRAFT OF NATIVE HEART WITH UNSTABLE ANGINA PECTORIS (HCC): ICD-10-CM

## 2021-09-07 DIAGNOSIS — I21.4 NSTEMI, INITIAL EPISODE OF CARE (HCC): ICD-10-CM

## 2021-09-07 DIAGNOSIS — I25.708 CORONARY ARTERY DISEASE INVOLVING CORONARY BYPASS GRAFT OF NATIVE HEART WITH OTHER FORMS OF ANGINA PECTORIS (HCC): Primary | ICD-10-CM

## 2021-09-07 DIAGNOSIS — I20.0 UNSTABLE ANGINA (HCC): ICD-10-CM

## 2021-09-07 PROCEDURE — 99214 OFFICE O/P EST MOD 30 MIN: CPT | Performed by: INTERNAL MEDICINE

## 2021-09-07 RX ORDER — METOPROLOL SUCCINATE 25 MG/1
25 TABLET, EXTENDED RELEASE ORAL DAILY
Qty: 30 TABLET | Refills: 5 | Status: SHIPPED | OUTPATIENT
Start: 2021-09-07 | End: 2022-03-08 | Stop reason: SDUPTHER

## 2021-09-07 NOTE — PROGRESS NOTES
Cardiology clinic note  Miky Cruz MD, PhD  Subjective:     Encounter Date:09/07/2021      Patient ID: Gil Garcia is a 66 y.o. male.    Chief Complaint:  Chief Complaint   Patient presents with   • Hospital Follow Up Visit   Hospital follow-up, non-ST elevation MI  Status post stenting circumflex    HPI:  History of Present Illness  I the pleasure to see this 66-year-old gentleman well-known to me from hospitalization with recent NSTEMI and chest pain, culprit was OM leading back to circumflex requiring overlapping Xience drug-eluting stents with great angiographic results, EF was 50% with mild lateral wall hypokinesis, limited injury with peak troponin of 2.  His LAD and diagonal did have some disease at the bifurcation, LAD is diffusely diseased with luminal regularities throughout possibly 30 to 40% with ostial  diagonal possibly 80% but both vessels with SERAFIN-3 flow.  Since his hospitalization he has had no further chest discomfort, he is walking without issue.  He has been lifting things again without chest pain or shortness of breath he appears well compensated.  I demonstrated his left heart catheterization images with stenting, burden of disease, discussed his case extensively today.  We did not perform FFR of the LAD or diagonal at the time of his interventional procedure given nonculprit vessels and he was not having hemodynamic compromise or any flow limitation at that time.  We did discuss given the anterior territory with bifurcation disease that would compromise the entire anterior wall that this should have risk stratification either invasively or noninvasively.  Given asymptomatic status I would prefer for treadmill stress with nuclear imaging which she is amenable for for risk stratification to ensure he is not having reversibility in the anterior wall.  He is also having shoulder pain and is having an MRI on his shoulder soon for possible need for correction of some abnormality which we  discussed should be delayed for 6 months unless emergent given recently placed stent.  His blood pressure today is 120 at goal and his heart rates in the 80s, he is quit smoking after his event and remains abstinent presently.  This was reinforced today.  No other questions or concerns    Review of systems negative x14 point review of systems except as mentioned above    Historical data copied forward from previous encounters in EMR is unchanged    Left heart catheterization is reviewed interpreted by me demonstrates the following:  =============================================================  Conclusion     Miky Cruz MD, PhD  [facility] cardiology  Date of service 8-15-21     Procedure  1.  Left heart catheterization with coronary angiography left ventriculography in PEREZ position  2.  Percutaneous coronary mention with overlapping Xience drug-eluting stents to the obtuse marginal branch back to proximal circumflex, 2.5 x 28 and 3.0 x 28 stents postdilated to 3.3 and 3.6 mm at high pressure respectively with 3.5 mm NC balloon     Indication  Non-ST elevation microinfarction  99% stenosis of the proximal first obtuse marginal branch with lesion extending back into the body of the circumflex, proximal circumflex 60% hazy thrombotic appearing lesion as well, decision was made to cover both lesions     After informed consent the patient is brought to the catheterization lab sterilely prepped and draped in usual fashion was placed in the right groin for right common femoral arterial access via micropuncture modified standard technique with placement of a 6 [place] sheath under fluoroscopic guidance which was aspirated flushed with heparinized saline.  An 035 guidewire was advanced level aortic valve followed by diagnostic JL4 and JR4 6 [place] catheters for selective left and right coronary angiography respectively.  The JR4 was used across aortic valve followed by an injection under low pressure  for LV gram, EDP assessment and pullback assessment transaortic valve gradient were also obtained.  Secondary to findings of culprit lesion in the obtuse marginal branch and circumflex decision was made to intervene despite diffuse nonobstructive but borderline disease elsewhere, patient was heparinized with 90 units/kg after ACT, ACT was kept 250 or greater with repeated boluses of heparin as needed intravenously.  EBU 6 French 4.0 guide was used engage left main, run-through wire was placed in the distal obtuse marginal branch over which a 2.5 x 12 NC balloon was used to predilate the lesion, this was then covered from the proximal portion of the obtuse marginal branch into the mid circumflex with 2.5 x 28 Xience drug-eluting stent deployed at 12 corry x 2 inflations followed by pullback of the stent balloon and postdilatation up to 20 corry with the stent balloon, further postdilatation was obtained with 3.0 x 15 NC balloon up to 20 corry avoiding the distal edge.  Lesion in the proximal circumflex was seen to be hazy and thrombotic with streaming and decision was made to cover this, an additional 3.0 x 28 Xience drug-eluting stent was overlapped with the aforementioned stent from the mid circumflex back to the proximal circumflex deployed at 12 to 14 corry on 2 inflations followed by advancing the stent balloon to the overlapping portion up to 20 corry, next this was exchanged for 3.5 x 15 NC balloon which was used to further post dilate the entire stented segment distally up to 14 corry and proximally up to 20 corry.  Final angiography revealed SERAFIN-3 flow to the circumflex and obtuse marginal with much improvement and much larger caliber obtuse marginal branch that on initiation of the procedure with restoration of SERAFIN-3 flow from slightly less than SERAFIN-3 flow, 99% stenosis reduced to 0% residual, proximal 60% reduced to 0% residual.  Intracoronary nitroglycerin was administered to the procedure secondary to age induced  spasm on both ends which resolved with intracoronary nitroglycerin, there was no edge dissection no edge issue no distal wire trauma, patient remained hemodynamically likely stable throughout the entire to the procedure.  All catheters and equipment were also removed after final angiography, 6 [place] Angio-Seal was used to close right common femoral arteriotomy with immediate hemostasis obtained distal pulses after right femoral angiogram.  Patient left the Cath Lab chest pain-free hemodynamically likely stable alert neurologically intact talking to staff.     Complications none  Blood loss less than 10 cc  Contrast 225 cc including diagnostic and interventional portion of procedure  Moderate conscious sedation time of 60 minutes with IV Versed and fentanyl administered by registered nurse with complete ECG pulse oximetry and hemodynamic body throughout the entire the case observed by me     Findings  1 opening at a pressure of 98/57  2.  Closing pressure was 114/61  3.  LVEDP 12 to 14 mmHg   #4 LV function low normal 50%  5.  No transaortic gradient        Angiography  1.  Left main nonobstructive disease less than 10 to 20% mild luminal regularities  2.  LAD diffusely diseased proximally at the takeoff of a high diagonal branch there is a 40% narrowing, there is 30 to 40% diffuse luminal regularities throughout the proximal mid into the distal LAD with diffuse atherosclerosis but nothing is obstructive, most severe lesion is again 50% in the proximal LAD flanking the bifurcation of the diagonal branch, ostium of the diagonal branch difficult to lay out but appears to have 70% stenosis, the body and distal portion of diagonal diffuse limb irregularities less than 30% with SERAFIN-3 flow, SERAFIN-3 flow in the LAD and diagonal branch and septals, LAD wraps around the apex  3.  Circumflex is a nondominant vessel with a large obtuse marginal with 4 branches distally along the lateral wall, proximal circumflex has diffuse  limb regularities, proximally 50 to 60% eccentric plaque, distally the continuation circumflex is very small after the takeoff of this very large obtuse marginal branch which is the dominant supply of the lateral wall.  Proximal obtuse marginal branch has 99% eccentric stenosis with slightly less and SERAFIN-3 flow distally.  Distal to this lesion there is not appear to be flow-limiting stenosis of the obtuse marginal branch or right subsegmental branches.  4.  The RCA is a dominant branch with PDA and PLV branch distally, the RCA has diffuse atherosclerotic disease 40% with diffuse luminal irregularities throughout the PDA and PLV branches but nothing obstructive, nothing greater than 30% or so.  SERAFIN-3 flow throughout     Conclusions and recommendations next   #1 non-ST elevation myocardial infarction with culprit of mid circumflex into first obtuse marginal branch 99% stenosis  2.  Successful intervention described above with placement of overlapping Xience drug-eluting stents 2.5 x 28 and 3.0 x 28 from the obtuse marginal branch back to proximal circumflex postdilated with 3.5 mm NC balloon up to 18 to 20 corry proximally.    #3 dual endplate with aspirin Brilinta  4.  Advance goal-directed medical therapy after acute MI as indicated by guidelines  5.  Discussed heart healthy diet with the patient at length along with diet and exercise when released by cardiology ultimately per AHA guidelines  6.  High intensity statin therapy will be pursued  7.  Patient be discharged once discharge criteria met after standard close observation from complications status post acute MI     Miky Cruz MD, PhD     2D echo was reviewed interpreted by me demonstrates the following  Interpretation Summary       · Estimated left ventricular EF was in agreement with the calculated left ventricular EF. Left ventricular ejection fraction appears to be 51 - 55%. Left ventricular systolic function is normal.  · Left ventricular diastolic  function was normal.  · Estimated right ventricular systolic pressure from tricuspid regurgitation is normal (<35 mmHg).     Overall EF 55%  Regional abnormalities noted with hypokinesis of the lateral wall mild to moderate  Normal diastolic function by criteria  Normal atrial sizes  No significant valvular abnormality identified  Normal PA systolic pressure  No masses or effusions            The following portions of the patient's history were reviewed and updated as appropriate: allergies, current medications, past family history, past medical history, past social history, past surgical history and problem list.    Problem List:  Patient Active Problem List   Diagnosis   • COVID-19   • Unstable angina (CMS/[facility])       Past Medical History:  Past Medical History:   Diagnosis Date   • Kidney stone    • Myocardial infarction (CMS/[facility]) 8/14/2021       Past Surgical History:  Past Surgical History:   Procedure Laterality Date   • APPENDECTOMY  1977   • CARDIAC CATHETERIZATION N/A 8/15/2021    Procedure: Left Heart Cath;  Surgeon: Miky Cruz MD;  Location: [facility] CATH INVASIVE LOCATION;  Service: Cardiology;  Laterality: N/A;   • CARDIAC CATHETERIZATION N/A 8/15/2021    Procedure: Percutaneous Coronary Intervention;  Surgeon: Miky Cruz MD;  Location:  [facility] CATH INVASIVE LOCATION;  Service: Cardiology;  Laterality: N/A;   • CORONARY STENT PLACEMENT  8/15/2021       Social History:  Social History     Socioeconomic History   • Marital status:      Spouse name: Not on file   • Number of children: Not on file   • Years of education: Not on file   • Highest education level: Not on file   Tobacco Use   • Smoking status: Former Smoker     Packs/day: 0.25     Years: 15.00     Pack years: 3.75     Types: Cigarettes   • Smokeless tobacco: Never Used   Vaping Use   • Vaping Use: Never used   Substance and Sexual Activity   • Alcohol use: Yes     Alcohol/week: 5.0 standard drinks     Types: 5  "Cans of beer per week   • Drug use: Never   • Sexual activity: Not Currently       Allergies:  No Known Allergies    Immunizations:    There is no immunization history on file for this patient.    ROS:  ROS       Objective:         /72 (BP Location: Left arm, Patient Position: Sitting)   Pulse 85   Resp 18   Ht 190.5 cm (75\")   Wt 97.1 kg (214 lb)   BMI 26.75 kg/m²     Physical Exam    In-Office Procedure(s):  Procedures    ASCVD RIsk Score::  The ASCVD Risk score (Rock PACHECO Jr., et al., 2013) failed to calculate for the following reasons:    The patient has a prior MI or stroke diagnosis    Recent Radiology:  Imaging Results (Most Recent)     None          Lab Review:   No results displayed because visit has over 200 results.                   Assessment:          Diagnosis Plan   1. 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 Essential hypertension  Comprehensive Metabolic Panel    TSH          Plan:        NSTEMI culprit circumflex into OM  Status post overlapping Xience drug-eluting stents as described above  Aspirin and prasugrel uninterrupted optimally for 1 year, minimum at least 6 months and should defer elective surgeries until this time  Add beta-blocker metoprolol 25 XL daily with heart rates in the 80s and lateral wall infarct with hypokinesis regionally  Blood pressure is at goal, no indication for ARB or ACE inhibitor presently with EF greater than 45%  Continue high intensity statin atorvastatin 40 mg daily  Repeat fasting lipid panel in 3 to 6 months  Residual disease of the LAD bifurcation with diagonal branch  Treadmill stress with nuclear imaging in 2 weeks which will be 30 days after his event for risk stratification of the anterior wall  He is angina free and as long as there is no reversible ischemia significantly anteriorly we will continue max " medical therapy for secondary risk reduction    Back in clinic in 6 months, sooner if needed or stressed  test is abnormal    It is a pleasure to be involved in his cardiovascular care.  Please call with any questions or concerns  Miky Cruz MD, PhD      Level of Care:                 Miky Cruz MD  09/07/21  .

## 2021-09-15 ENCOUNTER — PATIENT MESSAGE (OUTPATIENT)
Dept: CARDIOLOGY | Facility: CLINIC | Age: 66
End: 2021-09-15

## 2021-09-16 NOTE — TELEPHONE ENCOUNTER
From: Gil Garcia  To: Miky Cruz MD  Sent: 9/15/2021 5:03 PM EDT  Subject: Prescription Question    I picked up a refill for the Prasugrel 10mg tablet I have been taking. What they gave me is Metoprolol Succ Er 25mg tablet. Please let me know what I should do.

## 2021-09-19 ENCOUNTER — HOSPITAL ENCOUNTER (OUTPATIENT)
Dept: MRI IMAGING | Facility: HOSPITAL | Age: 66
Discharge: HOME | End: 2021-09-19
Admitting: NURSE PRACTITIONER

## 2021-09-19 DIAGNOSIS — M25.511 ACUTE PAIN OF RIGHT SHOULDER: ICD-10-CM

## 2021-09-19 PROCEDURE — 73221 MRI JOINT UPR EXTREM W/O DYE: CPT

## 2021-09-20 DIAGNOSIS — M25.511 RIGHT SHOULDER PAIN, UNSPECIFIED CHRONICITY: Primary | ICD-10-CM

## 2021-09-20 DIAGNOSIS — R93.6 ABNORMAL MRI, SHOULDER: ICD-10-CM

## 2021-09-30 ENCOUNTER — TELEPHONE (OUTPATIENT)
Dept: CARDIOLOGY | Facility: CLINIC | Age: 66
End: 2021-09-30

## 2021-09-30 PROBLEM — I25.700 CORONARY ARTERY DISEASE INVOLVING CORONARY BYPASS GRAFT OF NATIVE HEART WITH UNSTABLE ANGINA PECTORIS: Status: ACTIVE | Noted: 2021-09-30

## 2021-09-30 NOTE — TELEPHONE ENCOUNTER
Scheduling called and stated that the patients order for the stress test needs to be revised the diagnoses code does not meet medicare guidelines. The correct code needs to be revised they said that he needs to get on the cms website and he needs to choose and [place] ICD10 code that will work for medicare.

## 2021-10-05 ENCOUNTER — HOSPITAL ENCOUNTER (OUTPATIENT)
Dept: NUCLEAR MEDICINE | Facility: HOSPITAL | Age: 66
Discharge: HOME | End: 2021-10-05

## 2021-10-05 DIAGNOSIS — I20.0 UNSTABLE ANGINA (HCC): ICD-10-CM

## 2021-10-05 DIAGNOSIS — I25.700 CORONARY ARTERY DISEASE INVOLVING CORONARY BYPASS GRAFT OF NATIVE HEART WITH UNSTABLE ANGINA PECTORIS (HCC): ICD-10-CM

## 2021-10-05 DIAGNOSIS — I21.4 NSTEMI, INITIAL EPISODE OF CARE (HCC): ICD-10-CM

## 2021-10-05 DIAGNOSIS — I10 ESSENTIAL HYPERTENSION: ICD-10-CM

## 2021-10-05 PROCEDURE — 93017 CV STRESS TEST TRACING ONLY: CPT

## 2021-10-05 PROCEDURE — 78452 HT MUSCLE IMAGE SPECT MULT: CPT | Performed by: INTERNAL MEDICINE

## 2021-10-05 PROCEDURE — 0 TECHNETIUM TETROFOSMIN KIT: Performed by: INTERNAL MEDICINE

## 2021-10-05 PROCEDURE — A9502 TC99M TETROFOSMIN: HCPCS | Performed by: INTERNAL MEDICINE

## 2021-10-05 PROCEDURE — 93018 CV STRESS TEST I&R ONLY: CPT | Performed by: INTERNAL MEDICINE

## 2021-10-05 PROCEDURE — 78452 HT MUSCLE IMAGE SPECT MULT: CPT

## 2021-10-05 RX ADMIN — TETROFOSMIN 1 DOSE: 1.38 INJECTION, POWDER, LYOPHILIZED, FOR SOLUTION INTRAVENOUS at 12:45

## 2021-10-05 RX ADMIN — TETROFOSMIN 1 DOSE: 1.38 INJECTION, POWDER, LYOPHILIZED, FOR SOLUTION INTRAVENOUS at 11:14

## 2021-10-06 ENCOUNTER — PATIENT MESSAGE (OUTPATIENT)
Dept: CARDIOLOGY | Facility: CLINIC | Age: 66
End: 2021-10-06

## 2021-10-06 LAB
BH CV NUCLEAR PRIOR STUDY: 3
BH CV REST NUCLEAR ISOTOPE DOSE: 7.9 MCI
BH CV STRESS BP STAGE 1: NORMAL
BH CV STRESS BP STAGE 2: NORMAL
BH CV STRESS BP STAGE 3: NORMAL
BH CV STRESS DURATION MIN STAGE 1: 3
BH CV STRESS DURATION MIN STAGE 2: 3
BH CV STRESS DURATION MIN STAGE 3: 3
BH CV STRESS DURATION SEC STAGE 1: 0
BH CV STRESS DURATION SEC STAGE 2: 0
BH CV STRESS DURATION SEC STAGE 3: 0
BH CV STRESS GRADE STAGE 1: 10
BH CV STRESS GRADE STAGE 2: 12
BH CV STRESS GRADE STAGE 3: 14
BH CV STRESS HR STAGE 1: 103
BH CV STRESS HR STAGE 2: 122
BH CV STRESS HR STAGE 3: 130
BH CV STRESS METS STAGE 1: 5
BH CV STRESS METS STAGE 2: 7.5
BH CV STRESS METS STAGE 3: 10
BH CV STRESS NUCLEAR ISOTOPE DOSE: 21.5 MCI
BH CV STRESS PROTOCOL 1: NORMAL
BH CV STRESS SPEED STAGE 1: 1.7
BH CV STRESS SPEED STAGE 2: 2.5
BH CV STRESS SPEED STAGE 3: 3.4
BH CV STRESS STAGE 1: 1
BH CV STRESS STAGE 2: 2
BH CV STRESS STAGE 3: 3
LV EF NUC BP: 59 %
MAXIMAL PREDICTED HEART RATE: 154 BPM
PERCENT MAX PREDICTED HR: 84.42 %
STRESS BASELINE BP: NORMAL MMHG
STRESS BASELINE HR: 77 BPM
STRESS PERCENT HR: 99 %
STRESS POST PEAK BP: NORMAL MMHG
STRESS POST PEAK HR: 130 BPM
STRESS TARGET HR: 131 BPM

## 2021-10-07 NOTE — TELEPHONE ENCOUNTER
From: Gil Garcia  To: Miky Cruz MD  Sent: 10/6/2021 9:50 PM EDT  Subject: Test Results Question    Hi. Can you please interpret the results of my test for me?

## 2021-10-08 ENCOUNTER — TELEPHONE (OUTPATIENT)
Dept: CARDIOLOGY | Facility: CLINIC | Age: 66
End: 2021-10-08

## 2021-10-08 NOTE — TELEPHONE ENCOUNTER
Per dr merino, stress test low risk. Small abnormality but if patient is not having any symptoms he will discuss at his follow up visit. Spoke with patient he is not having any symptoms at all. He will let us know if he has any symptoms or needs to get in prior to his follow up in march.

## 2022-02-14 ENCOUNTER — E-VISIT (OUTPATIENT)
Dept: FAMILY MEDICINE CLINIC | Facility: TELEHEALTH | Age: 67
End: 2022-02-14

## 2022-02-14 NOTE — E-VISIT ESCALATED
Patient escalated   Provider Kellie Goddard chose to escalate patient to another level of care because: Patient's free text comments   Patient was sent the following message:   Needs in-person evaluation.   What to do now:    You should be seen within the next 24 hours. Please go to a walk-in clinic or urgent care, or make an appointment to be seen within the next 24 hours.   You won't be charged for your eVisit. If you paid with a credit card, the charge will be reversed.   Chief Complaint: Coronavirus (COVID-19), cold, sinus pain, allergy, or flu   Patient introduction   Patient is 66-year-old male who reports fever (which may have resolved; see below) that started 3-5 days ago.   Patient has not requested COVID testing.   COVID-19 exposure, testing history, and vaccination status:    No known exposure to a confirmed or suspected case of COVID-19.    No recent travel outside of their local community.    Patient had a self-test on 02/13/2022. Test result was negative.    Reports receiving 3 doses of the COVID-19 vaccine (Pfizer, Pfizer, Pfizer). Received their most recent dose of the vaccine > 14 days ago.   Risk factors for severe disease from COVID-19 infection:    Age 65 or older    BMI >= 25    Former smoker   When asked why they're seeking care online today, patient writes: I have really bad right side lower back pain, right side groin pain and 100.4 temperature   When asked why they're seeking care online today, patient reports they would like to mention the following reason in their own words: I have really bad right side lower back pain, right side groin pain and 100.4 temperature   Patient did not request an excuse note.   General presentation   Symptoms came on suddenly.   Fever:    Reports < 24 hours of measured fever.    Reports current measured fever since initial symptom onset.    Highest temperature of 100.4F-101.5F.   Sinus and nasal symptoms:    Denies nasal or sinus congestion.    Denies  rhinitis.    Denies itchy nose or sneezing.   Sore throat:    Denies sore throat.   Head and body aches:    Denies headache.    Denies sweats.    Denies chills.    Denies myalgia.    Denies fatigue.   Dizziness:    Denies dizziness.   Cough:    Denies cough.   Wheezing and SOB:    Denies asthma diagnosis.    Denies wheezing.    Denies shortness of breath.    Denies previous albuterol inhaler use during URIs, bronchitis, or pneumonia.    Denies previous steroid inhaler use during URIs, bronchitis, or pneumonia.   Chest pain:    Denies chest pain.   Allergies:    Denies history of allergies.   Flu exposure:    Denies recent exposure to confirmed flu diagnosis.    Denies receiving a flu vaccine this season.   Patient denies the following red flags:    Changes in alertness or awareness    Decreased urination   Self-exam:    No difficulty moving their chin toward their chest    Neck lymph nodes feel normal    Denies antibiotic treatment for similar symptoms within the past month    For weight-based dosing, patient reports current weight is 97.5 kilograms   Current medications   Reports taking over-the-counter medication for current symptoms. Patient has taken acetaminophen.   Reports taking amlodipine / atorvastatin, Apo-Metoprolol, and Prasugrel.   Medication allergies   None.   Medication contraindication review   Denies history of anaphylactic reaction to beta-lactam antibiotics; aspirin triad; blood dyscrasia; bone marrow depression; catecholamine-releasing paraganglioma; coronary artery disease; coagulation disorder; congenital long QT syndrome; depression; electrolyte abnormalities; fungal infection; GI bleeding; GI obstruction; G6PD deficiency; heart arrhythmia; hypertension; kidney disease or hemodialysis; mononucleosis; myasthenia; recent myocardial infarction; NSAID-induced asthma/urticaria; Parkinson's disease; pheochromocytoma; porphyria; Reye syndrome; seizure disorder; ulcerative colitis; and urinary  retention.   Denies history of metoclopramide-associated dystonic reaction and tardive dyskinesia.   No known history of amoxicillin-clavulanate-associated cholestatic jaundice or hepatic impairment.   No known history of azithromycin-associated cholestatic jaundice or hepatic impairment.   Past medical history   Immune conditions: Denies immunocompromising conditions. Denies history of cancer.   Social history   Former smoker.   Assessment:   Patient determined to need a level of care not appropriate to be delivered through eVisit.   Plan:   Patient informed of need to seek in-person care      ----------   Electronically signed by JIGNA Giron on 2022-02-14 at 13:44PM   ----------   Patient Interview Transcript:   Why are you getting care through eVisit today? We can't guarantee a specific treatment or test. Your provider will decide what's best for you. Select all that apply.    Other (specify on next screen)   Not selected:    I want to know if I have a cold or something more serious    I want to know if I need to be seen by a provider    I need a doctor's note    I want to be tested for COVID-19    I want to get the COVID-19 vaccine    I think I'm having side effects from the COVID-19 vaccine    I just want to feel better!   Tell us why you're getting care through eVisit today.    I have really bad right side lower back pain, right side groin pain and 100.4 temperature   COVID-19 symptoms are similar to symptoms of other illnesses. This makes it difficult to diagnose. Please carefully consider each question and answer as best you can. This will help your provider make the right diagnosis. Which of these symptoms are bothering you? Select all that apply.    Fever   Not selected:    Cough    Shortness of breath    Stuffed-up nose or sinuses    Runny nose    Itchy or watery eyes    Itchy nose or sneezing    Loss of smell or taste    Sore throat    Hoarse voice or loss of voice    Headache    Sweats     Chills    Muscle or body aches    Fatigue or tiredness    Nausea or vomiting    Diarrhea    I don't have any of these symptoms   Do you have any of these conditions? These conditions can put you at increased risk for severe disease if you're infected with COVID-19. Select all that apply.    None of the above   Not selected:    Chronic lung disease, such as cystic fibrosis or interstitial fibrosis    Heart disease, such as congenital heart disease, congestive heart failure, or coronary artery disease    Disorder of the brain, spinal cord, or nerves and muscles, such as dementia, cerebral palsy, epilepsy, muscular dystrophy, or developmental delay    Metabolic disorder or mitochondrial disease    Cerebrovascular disease, such as stroke or another condition affecting the blood vessels or blood supply to the brain    Down syndrome    Mood disorder, including depression or schizophrenia spectrum disorders    Substance use disorder, such as alcohol, opioid, or cocaine use disorder    Tuberculosis   Do you live in a group care setting? Examples include: - Nursing home - Residential care - Psychiatric treatment facility - [facility] - [facility] - Board and care home - Homeless shelter - Foster care setting Select one.    No   Not selected:    Yes   Have you ever been tested for COVID-19? Select one.    Yes   Not selected:    No   When was your most recent COVID-19 test? Select one.    Within the last week (specify date in MM/DD/YYYY format): 02/13/2022   Not selected:    7 to 14 days ago    15 to 30 days ago    1 to 3 months ago    More than 3 months ago   What type of COVID-19 test did you most recently have? There are two types of COVID-19 tests: - Viral tests check if you're currently infected with COVID-19. For these tests, a nose swab or saliva sample is taken. Viral tests include self-tests and tests done at a doctor's office, lab, or testing site. - Antibody tests check if you've been infected in the past. For these  "tests, your blood is drawn. Antibody tests can only be done at a doctor's office, lab, or testing site. Select one.    Viral self-test   Not selected:    Viral test at a doctor's office, lab, or testing site    Antibody test   What was the result of your most recent COVID-19 test? Select one.    Negative   Not selected:    Positive    I'm not sure   Have you gotten the COVID-19 vaccine? Select one.    Yes   Not selected:    No   How many doses of the COVID-19 vaccine have you gotten? This includes boosters. Select one.    3 doses   Not selected:    1 dose    2 doses   Which COVID-19 vaccine did you get for your first dose? Check your Vaccination Record Card under Product Name/. Select one.    Pfizer-BioNTech (Pfizer)   Not selected:    Dandre & Dandre's Priscilla Vaccine (J&J/Priscilla)    Moderna   Which COVID-19 vaccine did you get for your second dose? Check your Vaccination Record Card under Product Name/. Select one.    Pfizer-BioNTech (Pfizer)   Not selected:    Dandre & Dandre's Priscilla Vaccine (J&J/Priscilla)    Moderna   Which COVID-19 vaccine did you get for your third dose? Check your Vaccination Record Card under Product Name/. Select one.    Pfizer-BioNTech (Pfizer)   Not selected:    Dandre & Dandre's Priscilla Vaccine (J&J/Priscilla)    Moderna   When did you get your most recent dose of the COVID-19 vaccine?    More than 14 days ago   Not selected:    Less than 48 hours (2 days) ago    48 to 72 hours (3 days) ago    3 to 5 days ago    5 to 7 days ago    7 to 14 days ago   In the last 14 days, have you traveled outside of your local community? This includes travel by car, RV, bus, train, or plane. Travel increases your chances of getting and spreading COVID-19. Select one.    No   Not selected:    Yes   In the last 14 days, have you had close contact with someone who has coronavirus (COVID-19)? \"Close contact\" means any of these: - Living in the same household as " someone with COVID-19. - Caring for someone with COVID-19. - Being within 6 feet of someone with COVID-19 for a total of at least 15 minutes over a 24-hour period. For example, three 5-minute exposures for a total of 15 minutes. - Being in direct contact with respiratory droplets from someone with COVID-19 (being coughed on, kissing, sharing utensils). Select one.    No, not that I know of   Not selected:    Yes, a confirmed case    Yes, a suspected case   When did your current symptoms start? If you know the exact date your symptoms started, choose Other and enter the month and day. Select one.    3 to 5 days ago   Not selected:    Less than 48 hours ago    6 to 9 days ago    10 to 14 days ago    2 to 4 weeks ago    More than a month ago    Other (specify)   Did your symptoms come on suddenly or gradually? Select one.    Suddenly   Not selected:    Gradually    I'm not sure   You mentioned having a fever. Do you have a fever now? Select one.    Yes, and I've had one since my symptoms started   Not selected:    Yes, but I didn't have one when my symptoms started    No, it's gone now    I'm not sure   Did you take your temperature with a thermometer? Select one.    Yes   Not selected:    No, but it felt mild    No, but it felt high   What was the highest reading on the thermometer? Select one.    100.4 to 101.5F   Not selected:    Below 100.4F    101.6 to 101.9F    102.0 to 103.0F    Above 103.0F   How long have you had a fever? Select one.    Less than 24 hours   Not selected:    1 to 3 days    4 or more days   Since your symptoms started, have you felt dizzy? Select one.    No   Not selected:    Yes, but I can continue with my regular daily activities    Yes, and it makes it hard to stand, walk, or do daily activities   Do you have chest pain? You might also feel it as discomfort, aching, tightness, or squeezing in the chest. Select one.    No   Not selected:    Yes   Have you urinated at least 3 times in the last  24 hours? Select one.    Yes   Not selected:    No    I'm not sure   Changes in alertness or awareness may mean you need emergency care. Since your symptoms started, have you had any of these? Select all that apply.    None of the above   Not selected:    Confusion    Slurred speech    Not knowing where you are or what day it is    Difficulty staying conscious    Fainting or passing out   Do your symptoms include a whistling sound, or wheezing, when you breathe? Select one.    No   Not selected:    Yes    I'm not sure   Do you have any of these symptoms in your ear(s)? Select all that apply.    None of the above   Not selected:    Pain    Pressure    Fullness    Crackling or popping    Plugged or blocked sensation   Can you move your chin toward your chest?    Yes   Not selected:    No, my neck is too stiff   Are your glands/lymph nodes swollen, or does it hurt when you touch them?    No   Not selected:    Yes    I'm not sure   People with a very high body mass index (BMI) are at higher risk for developing complications from the flu and severe illness from COVID-19. To determine your BMI, we need to know your weight and height. Please enter your weight (in pounds).    Weight   Please enter your height.    Height   In the past week, has anyone around you (such as at school, work, or home) had a confirmed diagnosis of the flu? A confirmed diagnosis means that a nose swab was done to verify a flu infection. Select all that apply.    No   Not selected:    I live with someone who has the flu    I've been within touching distance of someone who has the flu    I've walked by, or sat about 3 feet away from, someone who has the flu    I've been in the same building as someone who has the flu    I'm not sure   Have you ever been diagnosed with asthma? Select one.    No   Not selected:    Yes   Have you ever been prescribed albuterol to use for wheezing, cough, or shortness of breath caused by a cold, bronchitis, or pneumonia?  Albuterol (ProAir, Proventil, Ventolin) is prescribed as an inhaler or a solution to be used with a nebulizer machine. Select one.    No   Not selected:    Yes    I'm not sure   Have you ever been prescribed a steroid inhaler to use for wheezing, cough, or shortness of breath caused by a cold, bronchitis, or pneumonia? Some examples of steroid inhalers include Pulmicort, Flovent, Qvar, and Alvesco. Select one.    No   Not selected:    Yes    I'm not sure   Have you ever been diagnosed with chronic obstructive pulmonary disease (COPD)? Select one.    I'm not sure   Not selected:    Yes    No   Do you have allergies (pollen, dust mites, mold, animal dander)? Select one.    No   Not selected:    Yes    I'm not sure   Have you had a flu shot this season? Select one.    No   Not selected:    Yes, less than 2 weeks ago    Yes, 2 to 4 weeks ago    Yes, 1 to 3 months ago    Yes, 3 to 6 months ago    Yes, more than 6 months ago    I'm not sure   The flu and COVID-19 can be more serious for people with certain conditions or characteristics. These questions help us figure out if you or anyone you live with is at higher risk for complications from these infections. Do either of these statements apply to you? Select all that apply.    None of the above   Not selected:    I'm  or Native [place]    I'm a healthcare worker   Do you smoke tobacco? Select one.    No, I quit   Not selected:    Yes, every day    Yes, some days    No, never   Some conditions can put you at risk for more serious infections. Do any of these apply to you? Select all that apply.    None of the above   Not selected:    I've been hospitalized within the last 5 days    I have diabetes    I'm in close contact with a child in    Are you currently being treated for any of these conditions? Scroll to see all options. Select all that apply.    None of the above   Not selected:    Aspirin triad (also known as Samter's triad or ASA triad)     Asthma or hives from taking aspirin or other NSAIDs, such as ibuprofen or naproxen    Blockage or narrowing of the blood vessels of the heart    Blood dyscrasia, such anemia, leukemia, lymphoma, or myeloma    Bone marrow depression    Catecholamine-releasing paraganglioma    Blood clotting disorder    Congenital long QT syndrome    Depression    Difficulty urinating or completely emptying your bladder    Uncorrected electrolyte abnormalities    Fungal infection    Gastrointestinal (GI) bleeding    Gastrointestinal (GI) obstruction    G6PD deficiency    Recent heart attack    High blood pressure    Irregular heartbeat or heart rhythm    Kidney disease or hemodialysis    Mononucleosis (mono)    Myasthenia gravis    Parkinson's disease    Pheochromocytoma    Reye syndrome    Seizure disorder    Ulcerative colitis   Do you have any of these conditions that can affect the immune system? Scroll to see all options. Select all that apply.    None of these   Not selected:    History of bone marrow transplant    Chronic kidney disease    Chronic liver disease (including cirrhosis)    HIV/AIDS    Inflammatory bowel disease (Crohn's disease or ulcerative colitis)    Lupus    Moderate to severe plaque psoriasis    Multiple sclerosis    Rheumatoid arthritis    Sickle cell anemia    Alpha or beta thalassemia    History of solid organ transplant (kidney, liver, or heart)    History of spleen removal    An autoimmune disorder not listed here    A condition requiring treatment with long-term use of oral steroids (such as prednisone, prednisolone, or dexamethasone)   Have you ever been diagnosed with cancer? Select one.    No   Not selected:    Yes, I have cancer now    Yes, but I'm in remission   Have you ever had either of these conditions? Select all that apply.    No   Not selected:    Metoclopramide-associated dystonic reaction    Tardive dyskinesia   Do any of these apply to the people who live with you? Select all that  apply.    None of the above   Not selected:    A child under the age of 5    An adult 65 or older    A person who is pregnant    A person who has given birth, had a miscarriage, had a pregnancy loss, or had an  in the last 2 weeks    An  or Native [place]   Does any member of your household have any of these medical conditions? Select all that apply.    None of the above   Not selected:    Asthma    Disorders of the brain, spinal cord, or nerves and muscles, such as dementia, cerebral palsy, epilepsy, muscular dystrophy, or developmental delay    Chronic lung disease, such as COPD or cystic fibrosis    Heart disease, such as congenital heart disease, congestive heart failure, or coronary artery disease    Cerebrovascular disease, such as stroke or another condition affecting the blood vessels or blood supply to the brain    Blood disorders, such as sickle cell disease    Diabetes    Metabolic disorders such as inherited metabolic disorders or mitochondrial disease    Kidney disorders    Liver disorders    Weakened immune system due to illness or medications such as chemotherapy or steroids    Children under the age of 19 who are on long-term aspirin therapy    Extreme obesity (BMI > 40)   Just a few more questions about medications, and then you're finished. Have you used any non-prescription medications or nasal sprays for your current symptoms? Examples include saline sprays, decongestants, NyQuil, and Tylenol. Select one.    Yes   Not selected:    No   Which of these non-prescription medications have you tried? Scroll to see all options. Select all that apply.    Acetaminophen (Tylenol)   Not selected:    Budesonide (Rhinocort)    Cetirizine (Zyrtec)    Chlorpheniramine (Aller-chlor, Chlor-Trimeton)    Cromolyn (NasalCrom)    Dextromethorphan (Delsym, Robitussin, Vicks DayQuil Cough)    Diphenhydramine (Benadryl)    Fexofenadine (Allegra)    Fluticasone (Flonase)    Guaifenesin (Mucinex)     Guaifenesin/dextromethorphan (Delsym DM, Mucinex DM, Robitussin DM)    Ibuprofen (Advil, Motrin, Midol)    Ketotifen (Alaway, Zaditor)    Loratadine (Alavert, Claritin)    Naphazoline-pheniramine (Naphcon-A, Opcon-A, Visine-A)    Omeprazole (Prilosec)    Oxymetazoline (Afrin)    Phenylephrine (Sudafed)    Triamcinolone (Nasacort)    None of the above   In the past month, have you taken antibiotics for similar symptoms? Examples of antibiotics include amoxicillin, amoxicillin-clavulanate (Augmentin), penicillin, cefdinir (Omnicef), doxycycline, and clindamycin (Cleocin). Select one.    No   Not selected:    Yes    I'm not sure   Have you taken any monoamine oxidase inhibitor (MAOI) medications in the last 14 days? Examples include rasagiline (Azilect), selegiline (Eldepryl, Zelapar), isocarboxazid (Marplan), phenelzine (Nardil), and tranylcypromine (Parnate). Select one.    No   Not selected:    Yes    I'm not sure   Do you take Kynmobi or Apokyn (apomorphine)? Select one.    No   Not selected:    Yes    I'm not sure   Are you taking any other medications or supplements? On the next screen, you need to list all vitamins, supplements, non-prescription medications (such as aspirin or Aleve), and prescription medications that you're taking. Select one.    Yes   Not selected:    Yes, but I'm not sure what they are    No   Have you ever had an allergic or bad reaction to any medication? Select one.    No   Not selected:    Yes   Are you allergic to milk or to the proteins found in milk (for example, whey or casein)? A milk allergy is different from lactose intolerance. Select one.    No   Not selected:    Yes    I'm not sure   Have you ever had jaundice or liver problems as a result of taking amoxicillin-clavulanate (Augmentin)? Jaundice is a condition in which the skin and the whites of the eyes turn yellow. Select all that apply.    No, not that I know of   Not selected:    Yes, jaundice    Yes, liver problems   Have  you ever had jaundice or liver problems as a result of taking azithromycin (Zithromax, Zmax)? Jaundice is a condition in which the skin and the whites of the eyes turn yellow. Select all that apply.    No, not that I know of   Not selected:    Yes, jaundice    Yes, liver problems   Do you need a doctor's note? A doctor's note confirms that you received care today and states when you can return to school or work. It does not contain information about your diagnosis or treatment plan. Your provider will make the final decision on whether to give you a doctor's note and for how long. Doctor's notes CANNOT be backdated. We can't provide medical leave paperwork through this type of visit. If more paperwork is needed to request time off, contact your primary care provider. Select one.    No   Not selected:    Today only (1 day)    Today and tomorrow (2 days)    3 days    7 days    10 days    14 days   Is there anything else you'd like to tell us about your symptoms?   The patient did not enter any additional information.   ----------   Medical history   Medical history data does not currently exist for this patient.

## 2022-02-22 NOTE — PROGRESS NOTES
Subjective   Gil Garcia is a 66 y.o. male.       HPI   Pt is here today for medication follow up.   1) HTN - currently on metoprolol XL 25 mg daily. BP is stable. Denies ay CP; palpitations; SOA; dizziness; headache; trouble with vision.   2) Hyperlipidemia - currently on atorvastatin 40 mg daily.   3) Abdominal pain - started a few weeks ago as right sided back pain; later moved into right lower abd/pelvic area.  Thinks he possible passed a kidney stone.  Had fever for a couple of days then symptoms went away.  He feels urination is normal. Not seeing blood with urine.  No further fevers.  BM's are normal.     The following portions of the patient's history were reviewed and updated as appropriate: allergies, current medications, past family history, past medical history, past social history, past surgical history and problem list.    Review of Systems   Constitutional: Positive for fever. Negative for activity change, appetite change, chills, diaphoresis, fatigue, unexpected weight gain and unexpected weight loss.   Eyes: Negative for visual disturbance.   Respiratory: Negative for cough, chest tightness, shortness of breath and wheezing.    Cardiovascular: Negative for chest pain and palpitations.   Gastrointestinal: Positive for abdominal pain. Negative for blood in stool, constipation, nausea, vomiting and indigestion.   Endocrine: Negative for polydipsia, polyphagia and polyuria.   Genitourinary: Positive for dysuria. Negative for difficulty urinating, flank pain, frequency, hematuria, urgency and urinary incontinence.   Musculoskeletal: Positive for back pain. Negative for arthralgias and myalgias.   Skin: Negative for rash.   Neurological: Negative for dizziness, weakness, numbness and headache.   Psychiatric/Behavioral: Negative for depressed mood. The patient is not nervous/anxious.        Objective   Physical Exam  Vitals reviewed.   Constitutional:       General: He is not in acute distress.      Appearance: Normal appearance.   Cardiovascular:      Rate and Rhythm: Normal rate and regular rhythm.      Pulses: Normal pulses.      Heart sounds: Normal heart sounds. No murmur heard.      Pulmonary:      Effort: Pulmonary effort is normal. No respiratory distress.      Breath sounds: Normal breath sounds. No wheezing or rhonchi.   Chest:      Chest wall: No tenderness.   Abdominal:      General: Bowel sounds are normal. There is no distension.      Palpations: Abdomen is soft.      Tenderness: There is no abdominal tenderness. There is no right CVA tenderness, left CVA tenderness, guarding or rebound.   Musculoskeletal:      Cervical back: Normal range of motion and neck supple.      Right lower leg: No edema.      Left lower leg: No edema.   Skin:     General: Skin is warm and dry.      Findings: No erythema.   Neurological:      General: No focal deficit present.      Mental Status: He is alert and oriented to person, place, and time.   Psychiatric:         Mood and Affect: Mood normal.           Assessment/Plan   Diagnoses and all orders for this visit:    1. Essential hypertension (Primary)  Comments:  Stable.   Cont. current medication.   Labs ordered.   RTO in 6 mo.   Orders:  -     Comprehensive metabolic panel; Future  -     Lipid panel; Future    2. Mixed hyperlipidemia  Comments:  Stable.   Cont. current medication.   Labs ordered.   RTO in 6 mo.   Orders:  -     Comprehensive metabolic panel; Future  -     Lipid panel; Future    3. Colon cancer screening  Comments:  Order given for screening colonoscopy  Orders:  -     Ambulatory Referral For Screening Colonoscopy    4. Encounter for hepatitis C screening test for low risk patient  Comments:  Labs today  Orders:  -     Hepatitis C antibody; Future    5. Prostate cancer screening  Comments:  PSA today.  Orders:  -     PSA SCREENING; Future    6. Suprapubic pain  Comments:  Resolved.   Cont. with plenty of fluids.   Contact office if symptoms return.

## 2022-02-23 ENCOUNTER — LAB (OUTPATIENT)
Dept: FAMILY MEDICINE CLINIC | Facility: CLINIC | Age: 67
End: 2022-02-23

## 2022-02-23 ENCOUNTER — OFFICE VISIT (OUTPATIENT)
Dept: FAMILY MEDICINE CLINIC | Facility: CLINIC | Age: 67
End: 2022-02-23

## 2022-02-23 VITALS
SYSTOLIC BLOOD PRESSURE: 122 MMHG | DIASTOLIC BLOOD PRESSURE: 82 MMHG | BODY MASS INDEX: 26.49 KG/M2 | OXYGEN SATURATION: 97 % | WEIGHT: 213 LBS | HEART RATE: 80 BPM | HEIGHT: 75 IN

## 2022-02-23 DIAGNOSIS — I10 ESSENTIAL HYPERTENSION: Primary | ICD-10-CM

## 2022-02-23 DIAGNOSIS — Z12.5 PROSTATE CANCER SCREENING: ICD-10-CM

## 2022-02-23 DIAGNOSIS — Z11.59 ENCOUNTER FOR HEPATITIS C SCREENING TEST FOR LOW RISK PATIENT: ICD-10-CM

## 2022-02-23 DIAGNOSIS — E78.2 MIXED HYPERLIPIDEMIA: ICD-10-CM

## 2022-02-23 DIAGNOSIS — Z12.11 COLON CANCER SCREENING: ICD-10-CM

## 2022-02-23 DIAGNOSIS — I10 ESSENTIAL HYPERTENSION: ICD-10-CM

## 2022-02-23 DIAGNOSIS — R10.2 SUPRAPUBIC PAIN: ICD-10-CM

## 2022-02-23 LAB
ALBUMIN SERPL-MCNC: 3.9 G/DL (ref 3.5–5.2)
ALBUMIN/GLOB SERPL: 1 G/DL
ALP SERPL-CCNC: 124 U/L (ref 39–117)
ALT SERPL W P-5'-P-CCNC: 140 U/L (ref 1–41)
ANION GAP SERPL CALCULATED.3IONS-SCNC: 11 MMOL/L (ref 5–15)
AST SERPL-CCNC: 58 U/L (ref 1–40)
BILIRUB SERPL-MCNC: 0.6 MG/DL (ref 0–1.2)
BUN SERPL-MCNC: 26 MG/DL (ref 8–23)
BUN/CREAT SERPL: 19.8 (ref 7–25)
CALCIUM SPEC-SCNC: 10.1 MG/DL (ref 8.6–10.5)
CHLORIDE SERPL-SCNC: 98 MMOL/L (ref 98–107)
CHOLEST SERPL-MCNC: 136 MG/DL (ref 0–200)
CO2 SERPL-SCNC: 27 MMOL/L (ref 22–29)
CREAT SERPL-MCNC: 1.31 MG/DL (ref 0.76–1.27)
GFR SERPL CREATININE-BSD FRML MDRD: 55 ML/MIN/1.73
GLOBULIN UR ELPH-MCNC: 3.8 GM/DL
GLUCOSE SERPL-MCNC: 109 MG/DL (ref 65–99)
HCV AB SER DONR QL: NORMAL
HDLC SERPL-MCNC: 26 MG/DL (ref 40–60)
LDLC SERPL CALC-MCNC: 91 MG/DL (ref 0–100)
LDLC/HDLC SERPL: 3.47 {RATIO}
POTASSIUM SERPL-SCNC: 4.7 MMOL/L (ref 3.5–5.2)
PROT SERPL-MCNC: 7.7 G/DL (ref 6–8.5)
PSA SERPL-MCNC: 2.43 NG/ML (ref 0–4)
SODIUM SERPL-SCNC: 136 MMOL/L (ref 136–145)
TRIGL SERPL-MCNC: 99 MG/DL (ref 0–150)
VLDLC SERPL-MCNC: 19 MG/DL (ref 5–40)

## 2022-02-23 PROCEDURE — 86803 HEPATITIS C AB TEST: CPT | Performed by: NURSE PRACTITIONER

## 2022-02-23 PROCEDURE — 99214 OFFICE O/P EST MOD 30 MIN: CPT | Performed by: NURSE PRACTITIONER

## 2022-02-23 PROCEDURE — 80061 LIPID PANEL: CPT | Performed by: NURSE PRACTITIONER

## 2022-02-23 PROCEDURE — 80053 COMPREHEN METABOLIC PANEL: CPT | Performed by: NURSE PRACTITIONER

## 2022-02-23 PROCEDURE — 36415 COLL VENOUS BLD VENIPUNCTURE: CPT

## 2022-02-23 PROCEDURE — G0103 PSA SCREENING: HCPCS | Performed by: NURSE PRACTITIONER

## 2022-02-24 DIAGNOSIS — R79.89 ELEVATED SERUM CREATININE: ICD-10-CM

## 2022-02-24 DIAGNOSIS — R74.8 ELEVATED LIVER ENZYMES: Primary | ICD-10-CM

## 2022-03-08 ENCOUNTER — OFFICE VISIT (OUTPATIENT)
Dept: CARDIOLOGY | Facility: CLINIC | Age: 67
End: 2022-03-08

## 2022-03-08 VITALS
HEIGHT: 75 IN | RESPIRATION RATE: 18 BRPM | BODY MASS INDEX: 26.41 KG/M2 | HEART RATE: 95 BPM | WEIGHT: 212.4 LBS | DIASTOLIC BLOOD PRESSURE: 82 MMHG | SYSTOLIC BLOOD PRESSURE: 116 MMHG

## 2022-03-08 DIAGNOSIS — I25.10 CORONARY ARTERY DISEASE INVOLVING NATIVE CORONARY ARTERY OF NATIVE HEART WITHOUT ANGINA PECTORIS: Primary | ICD-10-CM

## 2022-03-08 PROCEDURE — 99214 OFFICE O/P EST MOD 30 MIN: CPT | Performed by: INTERNAL MEDICINE

## 2022-03-08 RX ORDER — ASPIRIN 81 MG/1
81 TABLET ORAL DAILY
COMMUNITY

## 2022-03-08 RX ORDER — METOPROLOL SUCCINATE 25 MG/1
25 TABLET, EXTENDED RELEASE ORAL DAILY
Qty: 90 TABLET | Refills: 1 | Status: SHIPPED | OUTPATIENT
Start: 2022-03-08 | End: 2022-11-15 | Stop reason: SDUPTHER

## 2022-03-14 ENCOUNTER — LAB (OUTPATIENT)
Dept: FAMILY MEDICINE CLINIC | Facility: CLINIC | Age: 67
End: 2022-03-14

## 2022-03-14 DIAGNOSIS — R79.89 ELEVATED SERUM CREATININE: ICD-10-CM

## 2022-03-14 DIAGNOSIS — R74.8 ELEVATED LIVER ENZYMES: ICD-10-CM

## 2022-03-14 LAB
ALBUMIN SERPL-MCNC: 3.6 G/DL (ref 3.5–5.2)
ALBUMIN/GLOB SERPL: 0.8 G/DL
ALP SERPL-CCNC: 113 U/L (ref 39–117)
ALT SERPL W P-5'-P-CCNC: 45 U/L (ref 1–41)
ANION GAP SERPL CALCULATED.3IONS-SCNC: 9.9 MMOL/L (ref 5–15)
AST SERPL-CCNC: 20 U/L (ref 1–40)
BILIRUB SERPL-MCNC: 0.3 MG/DL (ref 0–1.2)
BUN SERPL-MCNC: 20 MG/DL (ref 8–23)
BUN/CREAT SERPL: 17.1 (ref 7–25)
CALCIUM SPEC-SCNC: 9.6 MG/DL (ref 8.6–10.5)
CHLORIDE SERPL-SCNC: 101 MMOL/L (ref 98–107)
CO2 SERPL-SCNC: 27.1 MMOL/L (ref 22–29)
CREAT SERPL-MCNC: 1.17 MG/DL (ref 0.76–1.27)
EGFRCR SERPLBLD CKD-EPI 2021: 68.8 ML/MIN/1.73
GLOBULIN UR ELPH-MCNC: 4.3 GM/DL
GLUCOSE SERPL-MCNC: 169 MG/DL (ref 65–99)
POTASSIUM SERPL-SCNC: 3.9 MMOL/L (ref 3.5–5.2)
PROT SERPL-MCNC: 7.9 G/DL (ref 6–8.5)
SODIUM SERPL-SCNC: 138 MMOL/L (ref 136–145)

## 2022-03-14 PROCEDURE — 36415 COLL VENOUS BLD VENIPUNCTURE: CPT

## 2022-03-14 PROCEDURE — 80053 COMPREHEN METABOLIC PANEL: CPT | Performed by: NURSE PRACTITIONER

## 2022-03-28 NOTE — PROGRESS NOTES
Cardiology Clinic Note  Miky Cruz MD, PhD    Subjective:     Encounter Date:03/08/2022      Patient ID: Gil Garcia is a 66 y.o. male.    Chief Complaint:  Chief Complaint   Patient presents with   • Follow-up       HPI:    I the pleasure to see this 66-year-old gentleman well-known to me from hospitalization with recent NSTEMI and chest pain, culprit was OM leading back to circumflex requiring overlapping Xience drug-eluting stents with great angiographic results, EF was 50% with mild lateral wall hypokinesis, limited injury with peak troponin of 2.  His LAD and diagonal did have some disease at the bifurcation, LAD is diffusely diseased with luminal regularities throughout possibly 30 to 40% with ostial  diagonal possibly 80% but both vessels with SERAFIN-3 flow.  Since his hospitalization he has had no further chest discomfort, he is walking without issue.  He has been lifting things again without chest pain or shortness of breath he appears well compensated.  I demonstrated his left heart catheterization images with stenting, burden of disease, discussed his case extensively today.  We did not perform FFR of the LAD or diagonal at the time of his interventional procedure given nonculprit vessels and he was not having hemodynamic compromise or any flow limitation at that time.  We did a stress test demonstrating no anterior reversibility by the lateral wall infarct consistent with his NSTEMI with mild anahi-infarct ischemia.  We did discuss secondary prevention goals.  He still complains of significant myalgias and joint pains on statin therapy which he is requesting to stop.  We will stop this for about 3 months and then reevaluate for alternative statin therapy and if able to tolerate will evaluate for PCSK9 inhibitors which he is amenable for     Continue abstinence from smoking  Nondiabetic  Continue aspirin and Effient beta-blocker     Review of systems negative x14 point review of systems except as  mentioned above     Historical data copied forward from previous encounters in EMR is unchanged     Left heart catheterization is reviewed interpreted by me demonstrates the following:  =============================================================  Conclusion      Miky Cruz MD, PhD  [facility] cardiology  Date of service 8-15-21     Procedure  1.  Left heart catheterization with coronary angiography left ventriculography in PEREZ position  2.  Percutaneous coronary mention with overlapping Xience drug-eluting stents to the obtuse marginal branch back to proximal circumflex, 2.5 x 28 and 3.0 x 28 stents postdilated to 3.3 and 3.6 mm at high pressure respectively with 3.5 mm NC balloon     Indication  Non-ST elevation microinfarction  99% stenosis of the proximal first obtuse marginal branch with lesion extending back into the body of the circumflex, proximal circumflex 60% hazy thrombotic appearing lesion as well, decision was made to cover both lesions     After informed consent the patient is brought to the catheterization lab sterilely prepped and draped in usual fashion was placed in the right groin for right common femoral arterial access via micropuncture modified standard technique with placement of a 6 Guinean sheath under fluoroscopic guidance which was aspirated flushed with heparinized saline.  An 035 guidewire was advanced level aortic valve followed by diagnostic JL4 and JR4 6 Guinean catheters for selective left and right coronary angiography respectively.  The JR4 was used across aortic valve followed by an injection under low pressure for LV gram, EDP assessment and pullback assessment transaortic valve gradient were also obtained.  Secondary to findings of culprit lesion in the obtuse marginal branch and circumflex decision was made to intervene despite diffuse nonobstructive but borderline disease elsewhere, patient was heparinized with 90 units/kg after ACT, ACT was kept 250 or  greater with repeated boluses of heparin as needed intravenously.  EBU 6 French 4.0 guide was used engage left main, run-through wire was placed in the distal obtuse marginal branch over which a 2.5 x 12 NC balloon was used to predilate the lesion, this was then covered from the proximal portion of the obtuse marginal branch into the mid circumflex with 2.5 x 28 Xience drug-eluting stent deployed at 12 corry x 2 inflations followed by pullback of the stent balloon and postdilatation up to 20 corry with the stent balloon, further postdilatation was obtained with 3.0 x 15 NC balloon up to 20 corry avoiding the distal edge.  Lesion in the proximal circumflex was seen to be hazy and thrombotic with streaming and decision was made to cover this, an additional 3.0 x 28 Xience drug-eluting stent was overlapped with the aforementioned stent from the mid circumflex back to the proximal circumflex deployed at 12 to 14 corry on 2 inflations followed by advancing the stent balloon to the overlapping portion up to 20 corry, next this was exchanged for 3.5 x 15 NC balloon which was used to further post dilate the entire stented segment distally up to 14 corry and proximally up to 20 corry.  Final angiography revealed SERAFIN-3 flow to the circumflex and obtuse marginal with much improvement and much larger caliber obtuse marginal branch that on initiation of the procedure with restoration of SERAFIN-3 flow from slightly less than SERAFIN-3 flow, 99% stenosis reduced to 0% residual, proximal 60% reduced to 0% residual.  Intracoronary nitroglycerin was administered to the procedure secondary to age induced spasm on both ends which resolved with intracoronary nitroglycerin, there was no edge dissection no edge issue no distal wire trauma, patient remained hemodynamically likely stable throughout the entire to the procedure.  All catheters and equipment were also removed after final angiography, 6 French Angio-Seal was used to close right common femoral  arteriotomy with immediate hemostasis obtained distal pulses after right femoral angiogram.  Patient left the Cath Lab chest pain-free hemodynamically likely stable alert neurologically intact talking to staff.     Complications none  Blood loss less than 10 cc  Contrast 225 cc including diagnostic and interventional portion of procedure  Moderate conscious sedation time of 60 minutes with IV Versed and fentanyl administered by registered nurse with complete ECG pulse oximetry and hemodynamic body throughout the entire the case observed by me     Findings  1 opening at a pressure of 98/57  2.  Closing pressure was 114/61  3.  LVEDP 12 to 14 mmHg   #4 LV function low normal 50%  5.  No transaortic gradient        Angiography  1.  Left main nonobstructive disease less than 10 to 20% mild luminal regularities  2.  LAD diffusely diseased proximally at the takeoff of a high diagonal branch there is a 40% narrowing, there is 30 to 40% diffuse luminal regularities throughout the proximal mid into the distal LAD with diffuse atherosclerosis but nothing is obstructive, most severe lesion is again 50% in the proximal LAD flanking the bifurcation of the diagonal branch, ostium of the diagonal branch difficult to lay out but appears to have 70% stenosis, the body and distal portion of diagonal diffuse limb irregularities less than 30% with SERAFIN-3 flow, SERAFIN-3 flow in the LAD and diagonal branch and septals, LAD wraps around the apex  3.  Circumflex is a nondominant vessel with a large obtuse marginal with 4 branches distally along the lateral wall, proximal circumflex has diffuse limb regularities, proximally 50 to 60% eccentric plaque, distally the continuation circumflex is very small after the takeoff of this very large obtuse marginal branch which is the dominant supply of the lateral wall.  Proximal obtuse marginal branch has 99% eccentric stenosis with slightly less and SERAFIN-3 flow distally.  Distal to this lesion there  is not appear to be flow-limiting stenosis of the obtuse marginal branch or right subsegmental branches.  4.  The RCA is a dominant branch with PDA and PLV branch distally, the RCA has diffuse atherosclerotic disease 40% with diffuse luminal irregularities throughout the PDA and PLV branches but nothing obstructive, nothing greater than 30% or so.  SERAFIN-3 flow throughout     Conclusions and recommendations next   #1 non-ST elevation myocardial infarction with culprit of mid circumflex into first obtuse marginal branch 99% stenosis  2.  Successful intervention described above with placement of overlapping Xience drug-eluting stents 2.5 x 28 and 3.0 x 28 from the obtuse marginal branch back to proximal circumflex postdilated with 3.5 mm NC balloon up to 18 to 20 corry proximally.    #3 dual endplate with aspirin Brilinta  4.  Advance goal-directed medical therapy after acute MI as indicated by guidelines  5.  Discussed heart healthy diet with the patient at length along with diet and exercise when released by cardiology ultimately per AHA guidelines  6.  High intensity statin therapy will be pursued  7.  Patient be discharged once discharge criteria met after standard close observation from complications status post acute MI     Miky Cruz MD, PhD      2D echo was reviewed interpreted by me demonstrates the following  Interpretation Summary        · Estimated left ventricular EF was in agreement with the calculated left ventricular EF. Left ventricular ejection fraction appears to be 51 - 55%. Left ventricular systolic function is normal.  · Left ventricular diastolic function was normal.  · Estimated right ventricular systolic pressure from tricuspid regurgitation is normal (<35 mmHg).     Overall EF 55%  Regional abnormalities noted with hypokinesis of the lateral wall mild to moderate  Normal diastolic function by criteria  Normal atrial sizes  No significant valvular abnormality identified  Normal PA systolic  "pressure  No masses or effusions                 Historical data copied forward from previous encounters in EMR including the history, exam, and assessment/plan has been reviewed and is unchanged unless noted otherwise.    Cardiac medicines reviewed with risk, benefits, and necessity of each discussed.    Risk and benefit of cardiac testing reviewed including death heart attack stroke pain bleeding infection need for vascular /cardiovascular surgery were discussed and the patient     Objective:         /82 (BP Location: Left arm, Patient Position: Sitting)   Pulse 95   Resp 18   Ht 190.5 cm (75\")   Wt 96.3 kg (212 lb 6.4 oz)   BMI 26.55 kg/m²     Physical Exam  Regular rate and rhythm no rubs gallops [place]  Clubbing cyanosis or edema  No carotid bruits or JVD  Warm and dry  Normal cap refill  Intact grossly  Clear to auscultation  Assessment:         Diagnoses and all orders for this visit:    1. Coronary artery disease involving native coronary artery of native heart without angina pectoris (Primary)  -     Comprehensive Metabolic Panel; Future    Other orders  -     metoprolol succinate XL (TOPROL-XL) 25 MG 24 hr tablet; Take 1 tablet by mouth Daily.  Dispense: 90 tablet; Refill: 1           Plan:      CAD  Essential hypertension  Hyperlipidemia  History of MI circumflex distribution status post PCI  Abnormal stress test  Risk factors for vascular disease      NSTEMI culprit circumflex into OM  Status post overlapping Xience drug-eluting stents as described above  Aspirin and prasugrel uninterrupted optimally for 1 year, minimum at least 6 months and should defer elective surgeries until this time  Refill beta-blocker metoprolol 25 XL daily with heart rates in the 80s and lateral wall infarct with hypokinesis regionally  Blood pressure is at goal, no indication for ARB or ACE inhibitor presently with EF greater than 45%  Significant myalgias, stop atorvastatin for 3 months, CMP in 30 days  We will " evaluate for alternative statin therapy, if unable to tolerate multiple will evaluate for PCSK9 inhibitors  Repeat fasting lipid panel in 3 to 6 months as well  Residual disease of the LAD bifurcation with diagonal branch, chest pain-free presently  Treadmill stress with nuclear imaging demonstrated medium size infarct in the lateral wall with anahi-infarct ischemia mildly but no anterior reversibility seen, EF 55 to 60%  He is angina free and as long as there is no reversible ischemia significantly anteriorly we will continue max medical therapy for secondary risk reduction     Back in clinic in 6 months, sooner if needed,     It is a pleasure to be involved in his cardiovascular care.  Please call with any questions or concerns  Miky Cruz MD, PhD    The pleasure to be involved in this patient's cardiovascular care.  Please call with any questions or concerns  Miky Cruz MD, PhD    Most recent EKG as reviewed and interpreted by me:  Procedures     Most recent echo as reviewed and interpreted by me:  Results for orders placed during the hospital encounter of 08/14/21    Adult Transthoracic Echo Complete W/ Cont if Necessary Per Protocol    Interpretation Summary  · Estimated left ventricular EF was in agreement with the calculated left ventricular EF. Left ventricular ejection fraction appears to be 51 - 55%. Left ventricular systolic function is normal.  · Left ventricular diastolic function was normal.  · Estimated right ventricular systolic pressure from tricuspid regurgitation is normal (<35 mmHg).    Overall EF 55%  Regional abnormalities noted with hypokinesis of the lateral wall mild to moderate  Normal diastolic function by criteria  Normal atrial sizes  No significant valvular abnormality identified  Normal PA systolic pressure  No masses or effusions      Most recent stress test as reviewed and interpreted by me:  Results for orders placed during the hospital encounter of 10/05/21    Stress Test  With Myocardial Perfusion One Day    Interpretation Summary  · Left ventricular ejection fraction is normal. (Calculated EF = 59%).  · Myocardial perfusion imaging indicates a medium-sized infarct located in the lateral wall with mild anahi-infarct ischemia.  · There is no prior study available for comparison.  · Impressions are consistent with a low risk study.  · Findings consistent with a normal ECG stress test.    Normal stress ECG, restarted heart rate, no ischemic changes at maximal stress or recovery  Occasional PVCs at peak stress and recovery  No significant tachyarrhythmias  Nuclear perfusion imaging demonstrates decreased uptake in the inferolateral wall at stress and rest consistent with small to moderate inferolateral infarct, there is mild anahi-infarct reversibility, summed difference score of 3 with summed stress score of 8 and summed rest score of 7.    Abnormal study  Inferolateral infarct  Mild anahi-infarct ischemia  Low to intermediate risk study      Most recent cardiac catheterization as reviewed interpreted by me:  Results for orders placed during the hospital encounter of 08/14/21    Cardiac Catheterization/Vascular Study    Narrative   Miky Cruz MD, PhD  [facility] cardiology  Date of service 8-15-21    Procedure  1.  Left heart catheterization with coronary angiography left ventriculography in PEREZ position  2.  Percutaneous coronary mention with overlapping Xience drug-eluting stents to the obtuse marginal branch back to proximal circumflex, 2.5 x 28 and 3.0 x 28 stents postdilated to 3.3 and 3.6 mm at high pressure respectively with 3.5 mm NC balloon    Indication  Non-ST elevation microinfarction  99% stenosis of the proximal first obtuse marginal branch with lesion extending back into the body of the circumflex, proximal circumflex 60% hazy thrombotic appearing lesion as well, decision was made to cover both lesions    After informed consent the patient is brought  to the catheterization lab sterilely prepped and draped in usual fashion was placed in the right groin for right common femoral arterial access via micropuncture modified standard technique with placement of a 6 French sheath under fluoroscopic guidance which was aspirated flushed with heparinized saline.  An 035 guidewire was advanced level aortic valve followed by diagnostic JL4 and JR4 6 French catheters for selective left and right coronary angiography respectively.  The JR4 was used across aortic valve followed by an injection under low pressure for LV gram, EDP assessment and pullback assessment transaortic valve gradient were also obtained.  Secondary to findings of culprit lesion in the obtuse marginal branch and circumflex decision was made to intervene despite diffuse nonobstructive but borderline disease elsewhere, patient was heparinized with 90 units/kg after ACT, ACT was kept 250 or greater with repeated boluses of heparin as needed intravenously.  EBU 6 French 4.0 guide was used engage left main, run-through wire was placed in the distal obtuse marginal branch over which a 2.5 x 12 NC balloon was used to predilate the lesion, this was then covered from the proximal portion of the obtuse marginal branch into the mid circumflex with 2.5 x 28 Xience drug-eluting stent deployed at 12 corry x 2 inflations followed by pullback of the stent balloon and postdilatation up to 20 corry with the stent balloon, further postdilatation was obtained with 3.0 x 15 NC balloon up to 20 corry avoiding the distal edge.  Lesion in the proximal circumflex was seen to be hazy and thrombotic with streaming and decision was made to cover this, an additional 3.0 x 28 Xience drug-eluting stent was overlapped with the aforementioned stent from the mid circumflex back to the proximal circumflex deployed at 12 to 14 corry on 2 inflations followed by advancing the stent balloon to the overlapping portion up to 20 corry, next this was  exchanged for 3.5 x 15 NC balloon which was used to further post dilate the entire stented segment distally up to 14 corry and proximally up to 20 corry.  Final angiography revealed SERAFIN-3 flow to the circumflex and obtuse marginal with much improvement and much larger caliber obtuse marginal branch that on initiation of the procedure with restoration of SERAFIN-3 flow from slightly less than SERAFIN-3 flow, 99% stenosis reduced to 0% residual, proximal 60% reduced to 0% residual.  Intracoronary nitroglycerin was administered to the procedure secondary to age induced spasm on both ends which resolved with intracoronary nitroglycerin, there was no edge dissection no edge issue no distal wire trauma, patient remained hemodynamically likely stable throughout the entire to the procedure.  All catheters and equipment were also removed after final angiography, 6 Japanese Angio-Seal was used to close right common femoral arteriotomy with immediate hemostasis obtained distal pulses after right femoral angiogram.  Patient left the Cath Lab chest pain-free hemodynamically likely stable alert neurologically intact talking to staff.    Complications none  Blood loss less than 10 cc  Contrast 225 cc including diagnostic and interventional portion of procedure  Moderate conscious sedation time of 60 minutes with IV Versed and fentanyl administered by registered nurse with complete ECG pulse oximetry and hemodynamic body throughout the entire the case observed by me    Findings  1 opening at a pressure of 98/57  2.  Closing pressure was 114/61  3.  LVEDP 12 to 14 mmHg  #4 LV function low normal 50%  5.  No transaortic gradient      Angiography  1.  Left main nonobstructive disease less than 10 to 20% mild luminal regularities  2.  LAD diffusely diseased proximally at the takeoff of a high diagonal branch there is a 40% narrowing, there is 30 to 40% diffuse luminal regularities throughout the proximal mid into the distal LAD with diffuse  atherosclerosis but nothing is obstructive, most severe lesion is again 50% in the proximal LAD flanking the bifurcation of the diagonal branch, ostium of the diagonal branch difficult to lay out but appears to have 70% stenosis, the body and distal portion of diagonal diffuse limb irregularities less than 30% with SERAFIN-3 flow, SERAFIN-3 flow in the LAD and diagonal branch and septals, LAD wraps around the apex  3.  Circumflex is a nondominant vessel with a large obtuse marginal with 4 branches distally along the lateral wall, proximal circumflex has diffuse limb regularities, proximally 50 to 60% eccentric plaque, distally the continuation circumflex is very small after the takeoff of this very large obtuse marginal branch which is the dominant supply of the lateral wall.  Proximal obtuse marginal branch has 99% eccentric stenosis with slightly less and SERAFIN-3 flow distally.  Distal to this lesion there is not appear to be flow-limiting stenosis of the obtuse marginal branch or right subsegmental branches.  4.  The RCA is a dominant branch with PDA and PLV branch distally, the RCA has diffuse atherosclerotic disease 40% with diffuse luminal irregularities throughout the PDA and PLV branches but nothing obstructive, nothing greater than 30% or so.  SERAFIN-3 flow throughout    Conclusions and recommendations next  #1 non-ST elevation myocardial infarction with culprit of mid circumflex into first obtuse marginal branch 99% stenosis  2.  Successful intervention described above with placement of overlapping Xience drug-eluting stents 2.5 x 28 and 3.0 x 28 from the obtuse marginal branch back to proximal circumflex postdilated with 3.5 mm NC balloon up to 18 to 20 corry proximally.  #3 dual endplate with aspirin Brilinta  4.  Advance goal-directed medical therapy after acute MI as indicated by guidelines  5.  Discussed heart healthy diet with the patient at length along with diet and exercise when released by cardiology  ultimately per AHA guidelines  6.  High intensity statin therapy will be pursued  7.  Patient be discharged once discharge criteria met after standard close observation from complications status post acute MI    Miky Cruz MD, PhD    The following portions of the patient's history were reviewed and updated as appropriate: allergies, current medications, past family history, past medical history, past social history, past surgical history and problem list.      ROS:  14 point review of systems negative except as mentioned above    Current Outpatient Medications:   •  aspirin 81 MG EC tablet, Take 81 mg by mouth Daily., Disp: , Rfl:   •  atorvastatin (LIPITOR) 40 MG tablet, Take 1 tablet by mouth Every Night., Disp: 90 tablet, Rfl: 3  •  metoprolol succinate XL (TOPROL-XL) 25 MG 24 hr tablet, Take 1 tablet by mouth Daily., Disp: 90 tablet, Rfl: 1  •  prasugrel (EFFIENT) 10 MG tablet, Take 60mg by mouth on the first day and then take 10mg by mouth daily thereafter., Disp: 90 tablet, Rfl: 3    Problem List:  Patient Active Problem List   Diagnosis   • COVID-19   • Unstable angina (HCC)   • Coronary artery disease involving coronary bypass graft of native heart with unstable angina pectoris (HCC)     Past Medical History:  Past Medical History:   Diagnosis Date   • Coronary artery disease involving coronary bypass graft of native heart with unstable angina pectoris (HCC) 9/30/2021   • Kidney stone    • Myocardial infarction (HCC) 8/14/2021     Past Surgical History:  Past Surgical History:   Procedure Laterality Date   • APPENDECTOMY  1977   • CARDIAC CATHETERIZATION N/A 8/15/2021    Procedure: Left Heart Cath;  Surgeon: Miky Cruz MD;  Location: [facility] CATH INVASIVE LOCATION;  Service: Cardiology;  Laterality: N/A;   • CARDIAC CATHETERIZATION N/A 8/15/2021    Procedure: Percutaneous Coronary Intervention;  Surgeon: Miky Cruz MD;  Location: [facility] CATH INVASIVE LOCATION;  Service:  Cardiology;  Laterality: N/A;   • CORONARY STENT PLACEMENT  8/15/2021     Social History:  Social History     Socioeconomic History   • Marital status:    Tobacco Use   • Smoking status: Former Smoker     Packs/day: 0.25     Years: 15.00     Pack years: 3.75     Types: Cigarettes   • Smokeless tobacco: Never Used   Vaping Use   • Vaping Use: Never used   Substance and Sexual Activity   • Alcohol use: Yes     Alcohol/week: 5.0 standard drinks     Types: 5 Cans of beer per week   • Drug use: Never   • Sexual activity: Not Currently     Allergies:  No Known Allergies  Immunizations:  Immunization History   Administered Date(s) Administered   • COVID-19 (PFIZER) PURPLE CAP 05/10/2021, 06/01/2021   • Flu Vaccine Quad PF 6-35MO 10/20/2018   • Flu Vaccine Quad PF >18YRS 10/23/2019   • Influenza Quad Vaccine (Inpatient) 10/29/2015            In-Office Procedure(s):  No orders to display        ASCVD RIsk Score::  The ASCVD Risk score (Rock TARA Jr., et al., 2013) failed to calculate for the following reasons:    The patient has a prior MI or stroke diagnosis    Imaging:    Results for orders placed during the hospital encounter of 08/23/21    XR Shoulder 2+ View Right    Narrative  XR SHOULDER 2+ VW RIGHT-    Date of Exam: 8/23/2021 11:40 AM    Indication: right shoulder pain; M25.511-Pain in right shoulder.    Comparison: None available.    Technique: 3 views of the shoulder were obtained.    FINDINGS:  There is no acute fracture. There is no dislocation. There is  mild AC joint arthritic change. There are no lytic or destructive bony  lesions.    Impression  No acute osseous abnormality.    Electronically Signed By-Benny Shirley MD On:8/23/2021 12:16 PM  This report was finalized on [id number] by  Benny Shirley MD.       Results for orders placed during the hospital encounter of 08/14/21    CT Chest Pulmonary Embolism    Narrative  CT CHEST PULMONARY EMBOLISM-    Date of Exam: 8/14/2021 5:26 PM    Indication: chest  pain/back pain.    Comparison: Radiograph 08/14/2021    Technique: Serial and axial CT images of the chest were obtained  following the uneventful intravenous administration of 100 cc of  Isovue-370 contrast. Reconstructions in the coronal and sagittal planes  were also performed.  Automated exposure control and iterative  reconstruction methods were used.    FINDINGS:    Pulmonary Arteries: Excellent contrast opacification of the pulmonary  arteries.  No intraluminal filling defects to suggest pulmonary embolus.  The pulmonary arteries are normal in caliber.    Hilum and Mediastinum: No pathologically enlarged lymph nodes.  Normal  heart size.  No significant coronary artery atherosclerotic disease.  Unremarkable thoracic aorta.   No pericardial effusion.    Lung Parenchyma and Pleura: No focal consolidations.  No suspicious  pulmonary nodules.  No endobronchial lesions.  No significant pleural  effusions.    Upper Abdomen: Unremarkable.    Soft tissues: Unremarkable.    Osseous structures: No aggressive focal lytic or sclerotic osseous  lesions.    Impression  1. No evidence of pulmonary embolus.  2. No acute cardiopulmonary process.  3. Ancillary findings as described above.          Electronically Signed By-Irina Freeman MD On:8/14/2021 5:39 PM  This report was finalized on [id number] by  Irina Freeman MD.      Results for orders placed during the hospital encounter of 08/14/21    CT Chest Pulmonary Embolism    Narrative  CT CHEST PULMONARY EMBOLISM-    Date of Exam: 8/14/2021 5:26 PM    Indication: chest pain/back pain.    Comparison: Radiograph 08/14/2021    Technique: Serial and axial CT images of the chest were obtained  following the uneventful intravenous administration of 100 cc of  Isovue-370 contrast. Reconstructions in the coronal and sagittal planes  were also performed.  Automated exposure control and iterative  reconstruction methods were used.    FINDINGS:    Pulmonary Arteries: Excellent  contrast opacification of the pulmonary  arteries.  No intraluminal filling defects to suggest pulmonary embolus.  The pulmonary arteries are normal in caliber.    Hilum and Mediastinum: No pathologically enlarged lymph nodes.  Normal  heart size.  No significant coronary artery atherosclerotic disease.  Unremarkable thoracic aorta.   No pericardial effusion.    Lung Parenchyma and Pleura: No focal consolidations.  No suspicious  pulmonary nodules.  No endobronchial lesions.  No significant pleural  effusions.    Upper Abdomen: Unremarkable.    Soft tissues: Unremarkable.    Osseous structures: No aggressive focal lytic or sclerotic osseous  lesions.    Impression  1. No evidence of pulmonary embolus.  2. No acute cardiopulmonary process.  3. Ancillary findings as described above.          Electronically Signed By-Irina Freeman MD On:8/14/2021 5:39 PM  This report was finalized on [id number] by  Irina Freeman MD.      Lab Review:   Lab on 02/23/2022   Component Date Value   • Glucose 02/23/2022 109 (A)   • BUN 02/23/2022 26 (A)   • Creatinine 02/23/2022 1.31 (A)   • Sodium 02/23/2022 136    • Potassium 02/23/2022 4.7    • Chloride 02/23/2022 98    • CO2 02/23/2022 27.0    • Calcium 02/23/2022 10.1    • Total Protein 02/23/2022 7.7    • Albumin 02/23/2022 3.90    • ALT (SGPT) 02/23/2022 140 (A)   • AST (SGOT) 02/23/2022 58 (A)   • Alkaline Phosphatase 02/23/2022 124 (A)   • Total Bilirubin 02/23/2022 0.6    • eGFR Non  Amer 02/23/2022 55 (A)   • Globulin 02/23/2022 3.8    • A/G Ratio 02/23/2022 1.0    • BUN/Creatinine Ratio 02/23/2022 19.8    • Anion Gap 02/23/2022 11.0    • Total Cholesterol 02/23/2022 136    • Triglycerides 02/23/2022 99    • HDL Cholesterol 02/23/2022 26 (A)   • LDL Cholesterol  02/23/2022 91    • VLDL Cholesterol 02/23/2022 19    • LDL/HDL Ratio 02/23/2022 3.47    • Hepatitis C Ab 02/23/2022 Non-Reactive    • PSA 02/23/2022 2.430    Hospital Outpatient Visit on 10/05/2021   Component  Date Value   • Target HR (85%) 10/05/2021 131    • Max. Pred. HR (100%) 10/05/2021 154    • BH CV STRESS PROTOCOL 1 10/05/2021 Shree    • Stage 1 10/05/2021 1    • HR Stage 1 10/05/2021 103    • BP Stage 1 10/05/2021 160/80    • Duration Min Stage 1 10/05/2021 3    • Duration Sec Stage 1 10/05/2021 0    • Grade Stage 1 10/05/2021 10    • Speed Stage 1 10/05/2021 1.7    • BH CV STRESS METS STAGE 1 10/05/2021 5    • Stage 2 10/05/2021 2    • HR Stage 2 10/05/2021 122    • BP Stage 2 10/05/2021 160/80    • Duration Min Stage 2 10/05/2021 3    • Duration Sec Stage 2 10/05/2021 0    • Grade Stage 2 10/05/2021 12    • Speed Stage 2 10/05/2021 2.5    • BH CV STRESS METS STAGE 2 10/05/2021 7.5    • Stage 3 10/05/2021 3    • HR Stage 3 10/05/2021 130    • BP Stage 3 10/05/2021 180/80    • Duration Min Stage 3 10/05/2021 3    • Duration Sec Stage 3 10/05/2021 0    • Grade Stage 3 10/05/2021 14    • Speed Stage 3 10/05/2021 3.4    • BH CV STRESS METS STAGE 3 10/05/2021 10.0    • Baseline HR 10/05/2021 77    • Baseline BP 10/05/2021 138/80    • Peak HR 10/05/2021 130    • Percent Max Pred HR 10/05/2021 84.42    • Percent Target HR 10/05/2021 99    • Peak BP 10/05/2021 140/80    • Nuclear Prior Study 10/05/2021 3    • BH CV REST NUCLEAR ISOTO* 10/05/2021 7.9    • BH CV STRESS NUCLEAR ISO* 10/05/2021 21.5    • Nuc Stress EF 10/05/2021 59      Recent labs reviewed and interpreted for clinical significance and application            Level of Care:           Miky Cruz MD  03/28/22  .

## 2022-04-24 ENCOUNTER — PATIENT MESSAGE (OUTPATIENT)
Dept: CARDIOLOGY | Facility: CLINIC | Age: 67
End: 2022-04-24

## 2022-04-26 DIAGNOSIS — Z83.2 FAMILY HX-BLOOD DISORDER: Primary | ICD-10-CM

## 2022-05-03 ENCOUNTER — TELEPHONE (OUTPATIENT)
Dept: ONCOLOGY | Facility: CLINIC | Age: 67
End: 2022-05-03

## 2022-05-11 ENCOUNTER — TELEPHONE (OUTPATIENT)
Dept: ONCOLOGY | Facility: CLINIC | Age: 67
End: 2022-05-11

## 2022-05-11 NOTE — TELEPHONE ENCOUNTER
Hub is instructed to read the documentation below to patient  ATTEMPTED TO CONTACT PATIENT REGARDING HEMATOLOGY REFERRAL.  NO ANSWER.  LMV ASKING PATIENT TO CALL BACK.

## 2022-08-23 ENCOUNTER — OFFICE VISIT (OUTPATIENT)
Dept: FAMILY MEDICINE CLINIC | Facility: CLINIC | Age: 67
End: 2022-08-23

## 2022-08-23 ENCOUNTER — LAB (OUTPATIENT)
Dept: FAMILY MEDICINE CLINIC | Facility: CLINIC | Age: 67
End: 2022-08-23

## 2022-08-23 VITALS
DIASTOLIC BLOOD PRESSURE: 88 MMHG | SYSTOLIC BLOOD PRESSURE: 140 MMHG | BODY MASS INDEX: 26.86 KG/M2 | HEART RATE: 89 BPM | OXYGEN SATURATION: 95 % | WEIGHT: 216 LBS | HEIGHT: 75 IN

## 2022-08-23 DIAGNOSIS — E78.2 MIXED HYPERLIPIDEMIA: ICD-10-CM

## 2022-08-23 DIAGNOSIS — I10 ESSENTIAL HYPERTENSION: ICD-10-CM

## 2022-08-23 DIAGNOSIS — I10 ESSENTIAL HYPERTENSION: Primary | ICD-10-CM

## 2022-08-23 LAB
ALBUMIN SERPL-MCNC: 4.2 G/DL (ref 3.5–5.2)
ALBUMIN/GLOB SERPL: 1.3 G/DL
ALP SERPL-CCNC: 65 U/L (ref 39–117)
ALT SERPL W P-5'-P-CCNC: 25 U/L (ref 1–41)
ANION GAP SERPL CALCULATED.3IONS-SCNC: 10.1 MMOL/L (ref 5–15)
AST SERPL-CCNC: 21 U/L (ref 1–40)
BILIRUB SERPL-MCNC: 0.4 MG/DL (ref 0–1.2)
BUN SERPL-MCNC: 19 MG/DL (ref 8–23)
BUN/CREAT SERPL: 17.4 (ref 7–25)
CALCIUM SPEC-SCNC: 9.4 MG/DL (ref 8.6–10.5)
CHLORIDE SERPL-SCNC: 108 MMOL/L (ref 98–107)
CHOLEST SERPL-MCNC: 231 MG/DL (ref 0–200)
CO2 SERPL-SCNC: 22.9 MMOL/L (ref 22–29)
CREAT SERPL-MCNC: 1.09 MG/DL (ref 0.76–1.27)
EGFRCR SERPLBLD CKD-EPI 2021: 74.4 ML/MIN/1.73
GLOBULIN UR ELPH-MCNC: 3.2 GM/DL
GLUCOSE SERPL-MCNC: 113 MG/DL (ref 65–99)
HDLC SERPL-MCNC: 42 MG/DL (ref 40–60)
LDLC SERPL CALC-MCNC: 173 MG/DL (ref 0–100)
LDLC/HDLC SERPL: 4.08 {RATIO}
POTASSIUM SERPL-SCNC: 4.3 MMOL/L (ref 3.5–5.2)
PROT SERPL-MCNC: 7.4 G/DL (ref 6–8.5)
SODIUM SERPL-SCNC: 141 MMOL/L (ref 136–145)
TRIGL SERPL-MCNC: 88 MG/DL (ref 0–150)
VLDLC SERPL-MCNC: 16 MG/DL (ref 5–40)

## 2022-08-23 PROCEDURE — 36415 COLL VENOUS BLD VENIPUNCTURE: CPT

## 2022-08-23 PROCEDURE — 80061 LIPID PANEL: CPT | Performed by: NURSE PRACTITIONER

## 2022-08-23 PROCEDURE — 99214 OFFICE O/P EST MOD 30 MIN: CPT | Performed by: NURSE PRACTITIONER

## 2022-08-23 PROCEDURE — 80053 COMPREHEN METABOLIC PANEL: CPT | Performed by: NURSE PRACTITIONER

## 2022-08-23 NOTE — PROGRESS NOTES
Subjective   Gil Garcia is a 67 y.o. male.       HPI   Pt is here today for 6 month follow up.  1) HTN - currently on metoprolol XL 25 mg daily.  Denies ay CP; palpitations; SOA; dizziness; headache; trouble with vision.  Doesn't regularly monitor BP at home.  Initial reading today was elevated but it has improved upon recheck.  Has follow up with cardiology in 2 weeks.   2) Hyperlipidemia - was on atorvastatin 40 mg daily. His cardiologist took him off of this about 6 months ago.  He has been working to maintain a healthy diet plan and exercise.      The following portions of the patient's history were reviewed and updated as appropriate: allergies, current medications, past family history, past medical history, past social history, past surgical history and problem list.    Review of Systems   Constitutional: Negative for chills, fatigue and fever.   Eyes: Negative for visual disturbance.   Respiratory: Negative for cough, chest tightness, shortness of breath and wheezing.    Cardiovascular: Negative for chest pain and palpitations.   Gastrointestinal: Negative for diarrhea, nausea and vomiting.   Neurological: Negative for dizziness, weakness and headache.   Psychiatric/Behavioral: Negative for depressed mood. The patient is not nervous/anxious.        Objective   Physical Exam  Vitals reviewed.   Constitutional:       General: He is not in acute distress.     Appearance: Normal appearance.   Cardiovascular:      Rate and Rhythm: Normal rate and regular rhythm.      Pulses: Normal pulses.      Heart sounds: Normal heart sounds. No murmur heard.  Pulmonary:      Effort: Pulmonary effort is normal. No respiratory distress.      Breath sounds: Normal breath sounds. No wheezing.   Chest:      Chest wall: No tenderness.   Musculoskeletal:      Right lower leg: No edema.      Left lower leg: No edema.   Skin:     General: Skin is warm and dry.      Findings: No erythema.   Neurological:      General: No focal  deficit present.      Mental Status: He is alert and oriented to person, place, and time.   Psychiatric:         Mood and Affect: Mood normal.           Assessment & Plan   Diagnoses and all orders for this visit:    1. Essential hypertension (Primary)  Comments:  Stable.   Cont. current medicaiton.   Labs ordered.   RTO in 6 mo.   Orders:  -     Lipid panel; Future  -     Comprehensive metabolic panel; Future    2. Mixed hyperlipidemia  Comments:  Stable.   Labs ordered.   RTO in 6 mo.   Orders:  -     Lipid panel; Future  -     Comprehensive metabolic panel; Future

## 2022-08-24 DIAGNOSIS — R73.9 HYPERGLYCEMIA: Primary | ICD-10-CM

## 2022-11-14 ENCOUNTER — PATIENT MESSAGE (OUTPATIENT)
Dept: CARDIOLOGY | Facility: CLINIC | Age: 67
End: 2022-11-14

## 2022-11-15 RX ORDER — METOPROLOL SUCCINATE 25 MG/1
25 TABLET, EXTENDED RELEASE ORAL DAILY
Qty: 90 TABLET | Refills: 1 | Status: SHIPPED | OUTPATIENT
Start: 2022-11-15 | End: 2022-11-17 | Stop reason: SDUPTHER

## 2022-11-17 RX ORDER — METOPROLOL SUCCINATE 25 MG/1
25 TABLET, EXTENDED RELEASE ORAL DAILY
Qty: 90 TABLET | Refills: 1 | Status: SHIPPED | OUTPATIENT
Start: 2022-11-17

## 2023-02-17 DIAGNOSIS — R07.9 CHEST PAIN, UNSPECIFIED TYPE: ICD-10-CM

## 2023-02-17 DIAGNOSIS — L60.8 TOENAIL DEFORMITY: Primary | ICD-10-CM

## 2023-02-17 RX ORDER — PRASUGREL 10 MG/1
TABLET, FILM COATED ORAL
Qty: 90 TABLET | Refills: 3 | Status: SHIPPED | OUTPATIENT
Start: 2023-02-17

## 2023-02-27 ENCOUNTER — LAB (OUTPATIENT)
Dept: FAMILY MEDICINE CLINIC | Facility: CLINIC | Age: 68
End: 2023-02-27
Payer: MEDICARE

## 2023-02-27 ENCOUNTER — OFFICE VISIT (OUTPATIENT)
Dept: FAMILY MEDICINE CLINIC | Facility: CLINIC | Age: 68
End: 2023-02-27
Payer: MEDICARE

## 2023-02-27 VITALS
DIASTOLIC BLOOD PRESSURE: 93 MMHG | HEART RATE: 81 BPM | HEIGHT: 75 IN | OXYGEN SATURATION: 98 % | BODY MASS INDEX: 27.48 KG/M2 | SYSTOLIC BLOOD PRESSURE: 143 MMHG | WEIGHT: 221 LBS

## 2023-02-27 DIAGNOSIS — R73.9 HYPERGLYCEMIA: ICD-10-CM

## 2023-02-27 DIAGNOSIS — I10 ESSENTIAL HYPERTENSION: ICD-10-CM

## 2023-02-27 DIAGNOSIS — E78.2 MIXED HYPERLIPIDEMIA: ICD-10-CM

## 2023-02-27 DIAGNOSIS — I10 ESSENTIAL HYPERTENSION: Primary | ICD-10-CM

## 2023-02-27 DIAGNOSIS — Z12.11 COLON CANCER SCREENING: ICD-10-CM

## 2023-02-27 PROCEDURE — 99214 OFFICE O/P EST MOD 30 MIN: CPT | Performed by: NURSE PRACTITIONER

## 2023-02-27 NOTE — PROGRESS NOTES
Subjective   Gil Garcia is a 67 y.o. male.       HPI   Pt is here today for 6 month follow up.   1) HTN - currently on metoprolol XL 25 mg daily. Doesn't monitor BP at home. Denies any CP; palpitations; SOA; dizziness; headache; trouble with vision.  BP today is 143/93; HR 81.   2) Hyperlipidemia - not currently on medication.  ;  in Aug 2022.    3) Colon screen - due   The following portions of the patient's history were reviewed and updated as appropriate: allergies, current medications, past family history, past medical history, past social history, past surgical history and problem list.    Review of Systems   Constitutional: Negative for chills, fatigue and fever.   Respiratory: Negative for cough, shortness of breath and wheezing.    Cardiovascular: Negative for chest pain and palpitations.   Gastrointestinal: Negative for abdominal pain, blood in stool, constipation, diarrhea, nausea, vomiting and GERD.   Genitourinary: Negative for dysuria, frequency, hematuria and urgency.   Neurological: Negative for dizziness, weakness and headache.   Psychiatric/Behavioral: Negative for depressed mood. The patient is not nervous/anxious.        Objective   Physical Exam  Vitals reviewed.   Constitutional:       General: He is not in acute distress.     Appearance: Normal appearance.   Cardiovascular:      Rate and Rhythm: Normal rate and regular rhythm.      Pulses: Normal pulses.      Heart sounds: Normal heart sounds. No murmur heard.  Pulmonary:      Effort: Pulmonary effort is normal. No respiratory distress.      Breath sounds: Normal breath sounds. No wheezing or rhonchi.   Chest:      Chest wall: No tenderness.   Abdominal:      Tenderness: There is no right CVA tenderness or left CVA tenderness.   Musculoskeletal:      Right lower leg: No edema.      Left lower leg: No edema.   Skin:     General: Skin is warm and dry.      Findings: No erythema.   Neurological:      General: No focal deficit  present.      Mental Status: He is alert and oriented to person, place, and time.   Psychiatric:         Mood and Affect: Mood normal.           Assessment & Plan   Diagnoses and all orders for this visit:    1. Essential hypertension (Primary)  Comments:  Cont. current medication.   BP check in one week.   Labs ordered.   RTO in 6 mo.   Orders:  -     Comprehensive metabolic panel; Future  -     Lipid panel; Future    2. Mixed hyperlipidemia  Comments:  Labs ordered.   Work on healthy diet; aim for 150 min exercise weekly.   Orders:  -     Comprehensive metabolic panel; Future  -     Lipid panel; Future    3. Colon cancer screening  Comments:  Order given for screening colonoscopy.   Orders:  -     Ambulatory Referral For Screening Colonoscopy

## 2023-03-06 ENCOUNTER — LAB (OUTPATIENT)
Dept: FAMILY MEDICINE CLINIC | Facility: CLINIC | Age: 68
End: 2023-03-06
Payer: MEDICARE

## 2023-03-06 ENCOUNTER — CLINICAL SUPPORT (OUTPATIENT)
Dept: FAMILY MEDICINE CLINIC | Facility: CLINIC | Age: 68
End: 2023-03-06
Payer: MEDICARE

## 2023-03-06 VITALS
TEMPERATURE: 98.7 F | DIASTOLIC BLOOD PRESSURE: 84 MMHG | SYSTOLIC BLOOD PRESSURE: 133 MMHG | OXYGEN SATURATION: 96 % | HEART RATE: 75 BPM

## 2023-03-06 DIAGNOSIS — R03.0 ELEVATED BP WITHOUT DIAGNOSIS OF HYPERTENSION: ICD-10-CM

## 2023-03-06 LAB
ALBUMIN SERPL-MCNC: 4.4 G/DL (ref 3.5–5.2)
ALBUMIN/GLOB SERPL: 1.4 G/DL
ALP SERPL-CCNC: 69 U/L (ref 39–117)
ALT SERPL W P-5'-P-CCNC: 37 U/L (ref 1–41)
ANION GAP SERPL CALCULATED.3IONS-SCNC: 11.2 MMOL/L (ref 5–15)
AST SERPL-CCNC: 27 U/L (ref 1–40)
BILIRUB SERPL-MCNC: 0.5 MG/DL (ref 0–1.2)
BUN SERPL-MCNC: 21 MG/DL (ref 8–23)
BUN/CREAT SERPL: 15.8 (ref 7–25)
CALCIUM SPEC-SCNC: 9.8 MG/DL (ref 8.6–10.5)
CHLORIDE SERPL-SCNC: 100 MMOL/L (ref 98–107)
CHOLEST SERPL-MCNC: 244 MG/DL (ref 0–200)
CO2 SERPL-SCNC: 25.8 MMOL/L (ref 22–29)
CREAT SERPL-MCNC: 1.33 MG/DL (ref 0.76–1.27)
EGFRCR SERPLBLD CKD-EPI 2021: 58.6 ML/MIN/1.73
GLOBULIN UR ELPH-MCNC: 3.2 GM/DL
GLUCOSE SERPL-MCNC: 102 MG/DL (ref 65–99)
HBA1C MFR BLD: 5.8 % (ref 4.8–5.6)
HDLC SERPL-MCNC: 45 MG/DL (ref 40–60)
LDLC SERPL CALC-MCNC: 167 MG/DL (ref 0–100)
LDLC/HDLC SERPL: 3.64 {RATIO}
POTASSIUM SERPL-SCNC: 4 MMOL/L (ref 3.5–5.2)
PROT SERPL-MCNC: 7.6 G/DL (ref 6–8.5)
SODIUM SERPL-SCNC: 137 MMOL/L (ref 136–145)
TRIGL SERPL-MCNC: 176 MG/DL (ref 0–150)
VLDLC SERPL-MCNC: 32 MG/DL (ref 5–40)

## 2023-03-06 PROCEDURE — 80061 LIPID PANEL: CPT | Performed by: NURSE PRACTITIONER

## 2023-03-06 PROCEDURE — 36415 COLL VENOUS BLD VENIPUNCTURE: CPT

## 2023-03-06 PROCEDURE — 83036 HEMOGLOBIN GLYCOSYLATED A1C: CPT | Performed by: NURSE PRACTITIONER

## 2023-03-06 PROCEDURE — 80053 COMPREHEN METABOLIC PANEL: CPT | Performed by: NURSE PRACTITIONER

## 2023-03-06 NOTE — PROGRESS NOTES
Pt is here today for BP check from visit on 2/27/23. Hp at that visit was 143/93 hr 81.   Per Flavia come in to check bp in 1 week, no med change at the time of visit.   Pt states that today has been stressful so bp may be elevated due to this.  Today's bp: 137/82 hr 77  No cp, soa, palpitations, dizziness, etc.  Had patient sit for 10 mins then retook bp.   Recheck bp: 133/84 hr 75  Advised pt to continue to monitor.

## 2023-03-07 DIAGNOSIS — R79.89 ELEVATED SERUM CREATININE: Primary | ICD-10-CM

## 2023-03-13 ENCOUNTER — TELEPHONE (OUTPATIENT)
Dept: FAMILY MEDICINE CLINIC | Facility: CLINIC | Age: 68
End: 2023-03-13

## 2023-03-13 DIAGNOSIS — E78.2 MIXED HYPERLIPIDEMIA: Primary | ICD-10-CM

## 2023-03-13 RX ORDER — ATORVASTATIN CALCIUM 20 MG/1
20 TABLET, FILM COATED ORAL DAILY
Qty: 90 TABLET | Refills: 3 | Status: SHIPPED | OUTPATIENT
Start: 2023-03-13

## 2023-03-13 NOTE — TELEPHONE ENCOUNTER
PATIENT WANTED TO LET MARY KNOW THAT HE DID SPEAK WITH DR. JULIO'S OFFICE AND THEY DID WANT HIM TO START TAKING A CHOLESTEROL MEDICATION AS MARY HAD SUGGESTED. PLEASE ADVISE.

## 2024-04-02 ENCOUNTER — HOSPITAL ENCOUNTER (EMERGENCY)
Facility: HOSPITAL | Age: 69
Discharge: HOME | End: 2024-04-02
Attending: EMERGENCY MEDICINE | Admitting: EMERGENCY MEDICINE
Payer: MEDICARE

## 2024-04-02 ENCOUNTER — APPOINTMENT (OUTPATIENT)
Dept: CT IMAGING | Facility: HOSPITAL | Age: 69
End: 2024-04-02
Payer: MEDICARE

## 2024-04-02 VITALS
OXYGEN SATURATION: 95 % | HEIGHT: 75 IN | WEIGHT: 204.15 LBS | TEMPERATURE: 99.4 F | HEART RATE: 88 BPM | SYSTOLIC BLOOD PRESSURE: 110 MMHG | DIASTOLIC BLOOD PRESSURE: 67 MMHG | RESPIRATION RATE: 20 BRPM | BODY MASS INDEX: 25.38 KG/M2

## 2024-04-02 DIAGNOSIS — N23 URETERAL COLIC: ICD-10-CM

## 2024-04-02 DIAGNOSIS — N20.1 URETERAL CALCULUS: Primary | ICD-10-CM

## 2024-04-02 LAB
ALBUMIN SERPL-MCNC: 3.1 G/DL (ref 3.5–5.2)
ALBUMIN/GLOB SERPL: 0.8 G/DL
ALP SERPL-CCNC: 95 U/L (ref 39–117)
ALT SERPL W P-5'-P-CCNC: 90 U/L (ref 1–41)
ANION GAP SERPL CALCULATED.3IONS-SCNC: 10 MMOL/L (ref 5–15)
AST SERPL-CCNC: 48 U/L (ref 1–40)
BACTERIA UR QL AUTO: ABNORMAL /HPF
BASOPHILS # BLD AUTO: 0.07 10*3/MM3 (ref 0–0.2)
BASOPHILS NFR BLD AUTO: 0.6 % (ref 0–1.5)
BILIRUB SERPL-MCNC: 1.2 MG/DL (ref 0–1.2)
BILIRUB UR QL STRIP: ABNORMAL
BUN SERPL-MCNC: 29 MG/DL (ref 8–23)
BUN/CREAT SERPL: 16.3 (ref 7–25)
CALCIUM SPEC-SCNC: 9.6 MG/DL (ref 8.6–10.5)
CHLORIDE SERPL-SCNC: 98 MMOL/L (ref 98–107)
CLARITY UR: CLEAR
CO2 SERPL-SCNC: 25 MMOL/L (ref 22–29)
COLOR UR: ABNORMAL
CREAT SERPL-MCNC: 1.78 MG/DL (ref 0.76–1.27)
DEPRECATED RDW RBC AUTO: 44.4 FL (ref 37–54)
EGFRCR SERPLBLD CKD-EPI 2021: 41 ML/MIN/1.73
EOSINOPHIL # BLD AUTO: 0.11 10*3/MM3 (ref 0–0.4)
EOSINOPHIL NFR BLD AUTO: 1 % (ref 0.3–6.2)
ERYTHROCYTE [DISTWIDTH] IN BLOOD BY AUTOMATED COUNT: 14.4 % (ref 12.3–15.4)
GLOBULIN UR ELPH-MCNC: 4.1 GM/DL
GLUCOSE SERPL-MCNC: 110 MG/DL (ref 65–99)
GLUCOSE UR STRIP-MCNC: NEGATIVE MG/DL
HCT VFR BLD AUTO: 42.4 % (ref 37.5–51)
HGB BLD-MCNC: 13.5 G/DL (ref 13–17.7)
HGB UR QL STRIP.AUTO: ABNORMAL
HOLD SPECIMEN: NORMAL
HOLD SPECIMEN: NORMAL
HYALINE CASTS UR QL AUTO: ABNORMAL /LPF
IMM GRANULOCYTES # BLD AUTO: 0.09 10*3/MM3 (ref 0–0.05)
IMM GRANULOCYTES NFR BLD AUTO: 0.8 % (ref 0–0.5)
KETONES UR QL STRIP: ABNORMAL
LEUKOCYTE ESTERASE UR QL STRIP.AUTO: ABNORMAL
LIPASE SERPL-CCNC: 36 U/L (ref 13–60)
LYMPHOCYTES # BLD AUTO: 0.96 10*3/MM3 (ref 0.7–3.1)
LYMPHOCYTES NFR BLD AUTO: 8.3 % (ref 19.6–45.3)
MCH RBC QN AUTO: 26.9 PG (ref 26.6–33)
MCHC RBC AUTO-ENTMCNC: 31.8 G/DL (ref 31.5–35.7)
MCV RBC AUTO: 84.6 FL (ref 79–97)
MONOCYTES # BLD AUTO: 0.65 10*3/MM3 (ref 0.1–0.9)
MONOCYTES NFR BLD AUTO: 5.6 % (ref 5–12)
NEUTROPHILS NFR BLD AUTO: 83.7 % (ref 42.7–76)
NEUTROPHILS NFR BLD AUTO: 9.66 10*3/MM3 (ref 1.7–7)
NITRITE UR QL STRIP: POSITIVE
NRBC BLD AUTO-RTO: 0 /100 WBC (ref 0–0.2)
PH UR STRIP.AUTO: 5.5 [PH] (ref 5–8)
PLATELET # BLD AUTO: 259 10*3/MM3 (ref 140–450)
PMV BLD AUTO: 9.7 FL (ref 6–12)
POTASSIUM SERPL-SCNC: 4.4 MMOL/L (ref 3.5–5.2)
PROT SERPL-MCNC: 7.2 G/DL (ref 6–8.5)
PROT UR QL STRIP: ABNORMAL
RBC # BLD AUTO: 5.01 10*6/MM3 (ref 4.14–5.8)
RBC # UR STRIP: ABNORMAL /HPF
RBC MORPH BLD: NORMAL
REF LAB TEST METHOD: ABNORMAL
SMALL PLATELETS BLD QL SMEAR: ADEQUATE
SODIUM SERPL-SCNC: 133 MMOL/L (ref 136–145)
SP GR UR STRIP: 1.02 (ref 1–1.03)
SQUAMOUS #/AREA URNS HPF: ABNORMAL /HPF
UROBILINOGEN UR QL STRIP: ABNORMAL
WBC # UR STRIP: ABNORMAL /HPF
WBC MORPH BLD: NORMAL
WBC NRBC COR # BLD AUTO: 11.54 10*3/MM3 (ref 3.4–10.8)
WHOLE BLOOD HOLD COAG: NORMAL
WHOLE BLOOD HOLD SPECIMEN: NORMAL

## 2024-04-02 PROCEDURE — 99284 EMERGENCY DEPT VISIT MOD MDM: CPT

## 2024-04-02 PROCEDURE — 74176 CT ABD & PELVIS W/O CONTRAST: CPT

## 2024-04-02 PROCEDURE — 85025 COMPLETE CBC W/AUTO DIFF WBC: CPT | Performed by: EMERGENCY MEDICINE

## 2024-04-02 PROCEDURE — 80053 COMPREHEN METABOLIC PANEL: CPT | Performed by: EMERGENCY MEDICINE

## 2024-04-02 PROCEDURE — 83690 ASSAY OF LIPASE: CPT | Performed by: EMERGENCY MEDICINE

## 2024-04-02 PROCEDURE — 81001 URINALYSIS AUTO W/SCOPE: CPT | Performed by: EMERGENCY MEDICINE

## 2024-04-02 PROCEDURE — 85007 BL SMEAR W/DIFF WBC COUNT: CPT | Performed by: EMERGENCY MEDICINE

## 2024-04-02 RX ORDER — HYDROCODONE BITARTRATE AND ACETAMINOPHEN 7.5; 325 MG/1; MG/1
1-2 TABLET ORAL EVERY 6 HOURS PRN
Qty: 15 TABLET | Refills: 0 | Status: SHIPPED | OUTPATIENT
Start: 2024-04-02

## 2024-04-02 RX ORDER — SODIUM CHLORIDE 0.9 % (FLUSH) 0.9 %
10 SYRINGE (ML) INJECTION AS NEEDED
Status: DISCONTINUED | OUTPATIENT
Start: 2024-04-02 | End: 2024-04-02 | Stop reason: HOSPADM

## 2024-04-02 NOTE — ED PROVIDER NOTES
Subjective   History of Present Illness  Patient is a 68-year-old male complaint of feeling generalized weakness.  States he has abdominal pain for the past 2 weeks.  2 weeks ago the pain was severe but minimal at this time.  Denies fever vomit diarrhea dysuria or other complaint.      Review of Systems    Past Medical History:   Diagnosis Date    Coronary artery disease involving coronary bypass graft of native heart with unstable angina pectoris 9/30/2021    Kidney stone     Myocardial infarction 8/14/2021       No Known Allergies    Past Surgical History:   Procedure Laterality Date    APPENDECTOMY  1977    CARDIAC CATHETERIZATION N/A 8/15/2021    Procedure: Left Heart Cath;  Surgeon: Miky Cruz MD;  Location: [facility] CATH INVASIVE LOCATION;  Service: Cardiology;  Laterality: N/A;    CARDIAC CATHETERIZATION N/A 8/15/2021    Procedure: Percutaneous Coronary Intervention;  Surgeon: Miky Cruz MD;  Location:  [facility] CATH INVASIVE LOCATION;  Service: Cardiology;  Laterality: N/A;    CORONARY STENT PLACEMENT  8/15/2021       Family History   Problem Relation Age of Onset    Other Mother     Hyperlipidemia Mother     Other Sister     Other Brother     Heart attack Father     Heart disease Father        Social History     Socioeconomic History    Marital status:    Tobacco Use    Smoking status: Former     Current packs/day: 0.25     Average packs/day: 0.3 packs/day for 15.0 years (3.8 ttl pk-yrs)     Types: Cigarettes    Smokeless tobacco: Never   Vaping Use    Vaping status: Never Used   Substance and Sexual Activity    Alcohol use: Not Currently     Alcohol/week: 5.0 standard drinks of alcohol     Types: 5 Cans of beer per week    Drug use: Never    Sexual activity: Not Currently           Objective   Physical Exam  Neck has no adenopathy JVD or bruits.  Lungs are clear.  Heart has regular rate rhythm without murmur.  Chest is nontender.  Abdomen is soft with no tenderness.  Patient  has normal bowel sounds without rebound or guard.  Back has no CVA tenderness.  Procedures           ED Course      Results for orders placed or performed during the hospital encounter of 04/02/24   Comprehensive Metabolic Panel    Specimen: Blood   Result Value Ref Range    Glucose 110 (H) 65 - 99 mg/dL    BUN 29 (H) 8 - 23 mg/dL    Creatinine 1.78 (H) 0.76 - 1.27 mg/dL    Sodium 133 (L) 136 - 145 mmol/L    Potassium 4.4 3.5 - 5.2 mmol/L    Chloride 98 98 - 107 mmol/L    CO2 25.0 22.0 - 29.0 mmol/L    Calcium 9.6 8.6 - 10.5 mg/dL    Total Protein 7.2 6.0 - 8.5 g/dL    Albumin 3.1 (L) 3.5 - 5.2 g/dL    ALT (SGPT) 90 (H) 1 - 41 U/L    AST (SGOT) 48 (H) 1 - 40 U/L    Alkaline Phosphatase 95 39 - 117 U/L    Total Bilirubin 1.2 0.0 - 1.2 mg/dL    Globulin 4.1 gm/dL    A/G Ratio 0.8 g/dL    BUN/Creatinine Ratio 16.3 7.0 - 25.0    Anion Gap 10.0 5.0 - 15.0 mmol/L    eGFR 41.0 (L) >60.0 mL/min/1.73   Lipase    Specimen: Blood   Result Value Ref Range    Lipase 36 13 - 60 U/L   Urinalysis With Microscopic If Indicated (No Culture) - Urine, Clean Catch    Specimen: Urine, Clean Catch   Result Value Ref Range    Color, UA Giovana (A) Yellow, Straw    Appearance, UA Clear Clear    pH, UA 5.5 5.0 - 8.0    Specific Gravity, UA 1.018 1.005 - 1.030    Glucose, UA Negative Negative    Ketones, UA Trace (A) Negative    Bilirubin, UA Small (1+) (A) Negative    Blood, UA Small (1+) (A) Negative    Protein, UA Trace (A) Negative    Leuk Esterase, UA Small (1+) (A) Negative    Nitrite, UA Positive (A) Negative    Urobilinogen, UA 2.0 E.U./dL (A) 0.2 - 1.0 E.U./dL   CBC Auto Differential    Specimen: Blood   Result Value Ref Range    WBC 11.54 (H) 3.40 - 10.80 10*3/mm3    RBC 5.01 4.14 - 5.80 10*6/mm3    Hemoglobin 13.5 13.0 - 17.7 g/dL    Hematocrit 42.4 37.5 - 51.0 %    MCV 84.6 79.0 - 97.0 fL    MCH 26.9 26.6 - 33.0 pg    MCHC 31.8 31.5 - 35.7 g/dL    RDW 14.4 12.3 - 15.4 %    RDW-SD 44.4 37.0 - 54.0 fl    MPV 9.7 6.0 - 12.0 fL     Platelets 259 140 - 450 10*3/mm3    Neutrophil % 83.7 (H) 42.7 - 76.0 %    Lymphocyte % 8.3 (L) 19.6 - 45.3 %    Monocyte % 5.6 5.0 - 12.0 %    Eosinophil % 1.0 0.3 - 6.2 %    Basophil % 0.6 0.0 - 1.5 %    Immature Grans % 0.8 (H) 0.0 - 0.5 %    Neutrophils, Absolute 9.66 (H) 1.70 - 7.00 10*3/mm3    Lymphocytes, Absolute 0.96 0.70 - 3.10 10*3/mm3    Monocytes, Absolute 0.65 0.10 - 0.90 10*3/mm3    Eosinophils, Absolute 0.11 0.00 - 0.40 10*3/mm3    Basophils, Absolute 0.07 0.00 - 0.20 10*3/mm3    Immature Grans, Absolute 0.09 (H) 0.00 - 0.05 10*3/mm3    nRBC 0.0 0.0 - 0.2 /100 WBC   Scan Slide    Specimen: Blood   Result Value Ref Range    RBC Morphology Normal Normal    WBC Morphology Normal Normal    Platelet Estimate Adequate Normal   Urinalysis, Microscopic Only - Urine, Clean Catch    Specimen: Urine, Clean Catch   Result Value Ref Range    RBC, UA 0-2 None Seen, 0-2 /HPF    WBC, UA 11-20 (A) None Seen, 0-2 /HPF    Bacteria, UA None Seen None Seen /HPF    Squamous Epithelial Cells, UA 0-2 None Seen, 0-2 /HPF    Hyaline Casts, UA 0-2 None Seen /LPF    Methodology Automated Microscopy    Green Top (Gel)   Result Value Ref Range    Extra Tube Hold for add-ons.    Lavender Top   Result Value Ref Range    Extra Tube hold for add-on    Gold Top - SST   Result Value Ref Range    Extra Tube Hold for add-ons.    Light Blue Top   Result Value Ref Range    Extra Tube Hold for add-ons.      CT Abdomen Pelvis Without Contrast    Result Date: 4/2/2024  Impression: 1.Obstructive uropathy on the left due to a mid left ureteral calculus. There are bilateral nonobstructing intrarenal calculi. 2.Bilateral renal lesions suggestive of cysts difficult completely characterize on this noncontrasted exam. 3.There is some right renal atrophy 4.Colonic diverticulosis. 5.Degenerative change lumbar spine and sacroiliac joints 6.Atherosclerotic vascular calcification 7.Bibasilar atelectasis. Electronically Signed: Levi Otto MD  4/2/2024  12:22 PM EDT  Workstation ID: [id number]                                          Medical Decision Making  CT scan abdomen pelvis without contrast Rosario my reading shows a 5 mm stone in the left proximal ureter with obstructive change.  There is no perforation obstruction or other acute abnormality.  Patient does have chronic renal insufficiency unchanged.  LFTs are normal with no evidence of hepatitis.  There is no electrolyte abnormality.  Lipase is normal with no evidence of pancreatitis.  UA shows no evidence infectious process.  Patient will be discharged.  He will be placed on hydrocodone.  Will follow with the on-call urologist.    Amount and/or Complexity of Data Reviewed  Labs: ordered. Decision-making details documented in ED Course.  Radiology: ordered and independent interpretation performed.    Risk  Prescription drug management.        Final diagnoses:   Ureteral calculus   Ureteral colic       ED Disposition  ED Disposition       ED Disposition   Discharge    Condition   Stable    Comment   --               No follow-up provider specified.       Medication List        New Prescriptions      HYDROcodone-acetaminophen 7.5-325 MG per tablet  Commonly known as: NORCO  Take 1-2 tablets by mouth Every 6 (Six) Hours As Needed for Moderate Pain.               Where to Get Your Medications        These medications were sent to [facility] [id number] - [place], [place] - [address] - [phone]  - [phone] FX  [address] [place] [address]      Phone: [phone]   HYDROcodone-acetaminophen 7.5-325 MG per tablet            Benny Butcher MD  04/02/24 [id number]

## 2024-04-05 ENCOUNTER — LAB (OUTPATIENT)
Dept: LAB | Facility: HOSPITAL | Age: 69
End: 2024-04-05
Payer: MEDICARE

## 2024-04-05 ENCOUNTER — TRANSCRIBE ORDERS (OUTPATIENT)
Dept: LAB | Facility: HOSPITAL | Age: 69
End: 2024-04-05
Payer: MEDICARE

## 2024-04-05 ENCOUNTER — HOSPITAL ENCOUNTER (OUTPATIENT)
Dept: CARDIOLOGY | Facility: HOSPITAL | Age: 69
Discharge: HOME | End: 2024-04-05
Payer: MEDICARE

## 2024-04-05 DIAGNOSIS — Z01.818 PRE-OP TESTING: ICD-10-CM

## 2024-04-05 DIAGNOSIS — Z01.818 PRE-OP TESTING: Primary | ICD-10-CM

## 2024-04-05 LAB
ANION GAP SERPL CALCULATED.3IONS-SCNC: 11 MMOL/L (ref 5–15)
BASOPHILS # BLD AUTO: 0.06 10*3/MM3 (ref 0–0.2)
BASOPHILS NFR BLD AUTO: 0.6 % (ref 0–1.5)
BUN SERPL-MCNC: 37 MG/DL (ref 8–23)
BUN/CREAT SERPL: 22.4 (ref 7–25)
CALCIUM SPEC-SCNC: 9.9 MG/DL (ref 8.6–10.5)
CHLORIDE SERPL-SCNC: 99 MMOL/L (ref 98–107)
CO2 SERPL-SCNC: 27 MMOL/L (ref 22–29)
CREAT SERPL-MCNC: 1.65 MG/DL (ref 0.76–1.27)
DEPRECATED RDW RBC AUTO: 39.7 FL (ref 37–54)
EGFRCR SERPLBLD CKD-EPI 2021: 45 ML/MIN/1.73
EOSINOPHIL # BLD AUTO: 0.14 10*3/MM3 (ref 0–0.4)
EOSINOPHIL NFR BLD AUTO: 1.4 % (ref 0.3–6.2)
ERYTHROCYTE [DISTWIDTH] IN BLOOD BY AUTOMATED COUNT: 13.7 % (ref 12.3–15.4)
GLUCOSE SERPL-MCNC: 121 MG/DL (ref 65–99)
HCT VFR BLD AUTO: 47.2 % (ref 37.5–51)
HGB BLD-MCNC: 15.5 G/DL (ref 13–17.7)
IMM GRANULOCYTES # BLD AUTO: 0.07 10*3/MM3 (ref 0–0.05)
IMM GRANULOCYTES NFR BLD AUTO: 0.7 % (ref 0–0.5)
LYMPHOCYTES # BLD AUTO: 1.31 10*3/MM3 (ref 0.7–3.1)
LYMPHOCYTES NFR BLD AUTO: 13.1 % (ref 19.6–45.3)
MCH RBC QN AUTO: 26.7 PG (ref 26.6–33)
MCHC RBC AUTO-ENTMCNC: 32.8 G/DL (ref 31.5–35.7)
MCV RBC AUTO: 81.4 FL (ref 79–97)
MONOCYTES # BLD AUTO: 0.44 10*3/MM3 (ref 0.1–0.9)
MONOCYTES NFR BLD AUTO: 4.4 % (ref 5–12)
NEUTROPHILS NFR BLD AUTO: 7.96 10*3/MM3 (ref 1.7–7)
NEUTROPHILS NFR BLD AUTO: 79.8 % (ref 42.7–76)
NRBC BLD AUTO-RTO: 0 /100 WBC (ref 0–0.2)
PLATELET # BLD AUTO: 329 10*3/MM3 (ref 140–450)
PMV BLD AUTO: 10.4 FL (ref 6–12)
POTASSIUM SERPL-SCNC: 4.4 MMOL/L (ref 3.5–5.2)
RBC # BLD AUTO: 5.8 10*6/MM3 (ref 4.14–5.8)
SODIUM SERPL-SCNC: 137 MMOL/L (ref 136–145)
WBC NRBC COR # BLD AUTO: 9.98 10*3/MM3 (ref 3.4–10.8)

## 2024-04-05 PROCEDURE — 93005 ELECTROCARDIOGRAM TRACING: CPT | Performed by: UROLOGY

## 2024-04-05 PROCEDURE — 80048 BASIC METABOLIC PNL TOTAL CA: CPT

## 2024-04-05 PROCEDURE — 36415 COLL VENOUS BLD VENIPUNCTURE: CPT

## 2024-04-05 PROCEDURE — 85025 COMPLETE CBC W/AUTO DIFF WBC: CPT

## 2024-04-07 LAB
QT INTERVAL: 423 MS
QTC INTERVAL: 451 MS

## 2024-04-09 ENCOUNTER — TELEPHONE (OUTPATIENT)
Dept: CARDIOLOGY | Facility: CLINIC | Age: 69
End: 2024-04-09
Payer: MEDICARE

## 2024-04-09 NOTE — TELEPHONE ENCOUNTER
REQUEST FOR CARDIAC CLEARANCE    Caller name: Gil Garcia     Phone Number: [phone]    Surgeon's name: DR BLUM WITH [facility]    Type of planned surgery: KIDNEY STONE REMOVAL     Date of planned surgery: WAS TOMORROW BUT CANCELED UNTIL PT GETS THIS CLEARANCE     Type of anesthesia: UNKNOWN    Have you been experiencing chest pain or shortness of breath? NO     Is your doctor requesting for you to stop any of your medications prior to your surgery? NO     Where should we fax the clearance to? [facility] IS FAXING OVER A CARDIAC CLEARANCE FORM TO BE FILLED OUT AND FAXED BACK.

## 2024-04-12 ENCOUNTER — OFFICE VISIT (OUTPATIENT)
Dept: CARDIOLOGY | Facility: CLINIC | Age: 69
End: 2024-04-12
Payer: MEDICARE

## 2024-04-12 ENCOUNTER — TELEPHONE (OUTPATIENT)
Dept: CARDIOLOGY | Facility: CLINIC | Age: 69
End: 2024-04-12

## 2024-04-12 VITALS
DIASTOLIC BLOOD PRESSURE: 80 MMHG | WEIGHT: 199 LBS | BODY MASS INDEX: 24.74 KG/M2 | HEIGHT: 75 IN | HEART RATE: 111 BPM | SYSTOLIC BLOOD PRESSURE: 114 MMHG | RESPIRATION RATE: 18 BRPM

## 2024-04-12 DIAGNOSIS — I25.700 CORONARY ARTERY DISEASE INVOLVING CORONARY BYPASS GRAFT OF NATIVE HEART WITH UNSTABLE ANGINA PECTORIS: Primary | ICD-10-CM

## 2024-04-12 PROCEDURE — 99214 OFFICE O/P EST MOD 30 MIN: CPT | Performed by: INTERNAL MEDICINE

## 2024-04-12 NOTE — TELEPHONE ENCOUNTER
Caller: SANTY    Relationship: [facility] PRE ADMISSION TESTING    Best call back number: [phone]    What is the best time to reach you: ANY      What was the call regarding: PLEASE SEND THE VISIT NOTE AND ANY TESTING FROM 4/12/24- PT'S PROCEDURE IS ON 4/15/24    [phone]

## 2024-04-16 DIAGNOSIS — E78.2 MIXED HYPERLIPIDEMIA: ICD-10-CM

## 2024-04-16 RX ORDER — ATORVASTATIN CALCIUM 20 MG/1
20 TABLET, FILM COATED ORAL DAILY
Qty: 90 TABLET | Refills: 3 | Status: SHIPPED | OUTPATIENT
Start: 2024-04-16

## 2024-04-16 RX ORDER — METOPROLOL SUCCINATE 25 MG/1
25 TABLET, EXTENDED RELEASE ORAL DAILY
COMMUNITY

## 2024-04-16 RX ORDER — CEPHALEXIN 500 MG/1
500 CAPSULE ORAL 3 TIMES DAILY
COMMUNITY
Start: 2024-04-15 | End: 2024-04-22 | Stop reason: HOSPADM

## 2024-04-16 RX ORDER — METOPROLOL SUCCINATE 25 MG/1
25 TABLET, EXTENDED RELEASE ORAL DAILY
Qty: 90 TABLET | Refills: 1 | Status: SHIPPED | OUTPATIENT
Start: 2024-04-16 | End: 2024-04-16

## 2024-04-16 NOTE — TELEPHONE ENCOUNTER
Caller: Gil Garcia    Relationship: Self    Best call back number: [phone]     Requested Prescriptions:   Requested Prescriptions     Pending Prescriptions Disp Refills    metoprolol succinate XL (TOPROL-XL) 25 MG 24 hr tablet 90 tablet 1     Sig: Take 1 tablet by mouth Daily.    atorvastatin (LIPITOR) 20 MG tablet 90 tablet 3     Sig: Take 1 tablet by mouth Daily.        Pharmacy where request should be sent: [facility] [id number] [place] [place] [address] - [phone]  - [phone] FX     Last office visit with prescribing clinician: 4/12/2024   Last telemedicine visit with prescribing clinician: Visit date not found   Next office visit with prescribing clinician: 10/15/2024     Additional details provided by patient: PT WAS STARTED BACK ON MEDICATION AND IS NEEDING NEW PRESCRIPTIONS SENT TO PHARMACY - PT REQUESTING 60 DAY REFILLS AND HAS ABOUT A WEEK AND A HALF OF MEDICATION LEFTOVER     Does the patient have less than a 3 day supply:  [] Yes  [x] No      Mitzy Cole Rep   04/16/24 09:53 EDT

## 2024-04-17 ENCOUNTER — LAB (OUTPATIENT)
Dept: LAB | Facility: HOSPITAL | Age: 69
End: 2024-04-17
Payer: MEDICARE

## 2024-04-17 LAB
ANION GAP SERPL CALCULATED.3IONS-SCNC: 13 MMOL/L (ref 5–15)
BUN SERPL-MCNC: 24 MG/DL (ref 8–23)
BUN/CREAT SERPL: 13.9 (ref 7–25)
CALCIUM SPEC-SCNC: 8.2 MG/DL (ref 8.6–10.5)
CHLORIDE SERPL-SCNC: 104 MMOL/L (ref 98–107)
CO2 SERPL-SCNC: 23 MMOL/L (ref 22–29)
CREAT SERPL-MCNC: 1.73 MG/DL (ref 0.76–1.27)
EGFRCR SERPLBLD CKD-EPI 2021: 42.5 ML/MIN/1.73
GLUCOSE SERPL-MCNC: 163 MG/DL (ref 65–99)
POTASSIUM SERPL-SCNC: 3.6 MMOL/L (ref 3.5–5.2)
SODIUM SERPL-SCNC: 140 MMOL/L (ref 136–145)

## 2024-04-17 PROCEDURE — 36415 COLL VENOUS BLD VENIPUNCTURE: CPT | Performed by: UROLOGY

## 2024-04-17 PROCEDURE — 80048 BASIC METABOLIC PNL TOTAL CA: CPT | Performed by: UROLOGY

## 2024-04-22 ENCOUNTER — ANESTHESIA EVENT (OUTPATIENT)
Dept: PERIOP | Facility: HOSPITAL | Age: 69
End: 2024-04-22
Payer: MEDICARE

## 2024-04-22 ENCOUNTER — HOSPITAL ENCOUNTER (OUTPATIENT)
Facility: HOSPITAL | Age: 69
Setting detail: HOSPITAL OUTPATIENT SURGERY
Discharge: HOME | End: 2024-04-22
Attending: UROLOGY | Admitting: UROLOGY
Payer: MEDICARE

## 2024-04-22 ENCOUNTER — ANESTHESIA (OUTPATIENT)
Dept: PERIOP | Facility: HOSPITAL | Age: 69
End: 2024-04-22
Payer: MEDICARE

## 2024-04-22 ENCOUNTER — HOSPITAL ENCOUNTER (OUTPATIENT)
Dept: GENERAL RADIOLOGY | Facility: HOSPITAL | Age: 69
Discharge: HOME | End: 2024-04-22
Admitting: UROLOGY
Payer: MEDICARE

## 2024-04-22 VITALS
DIASTOLIC BLOOD PRESSURE: 85 MMHG | WEIGHT: 203.6 LBS | BODY MASS INDEX: 25.31 KG/M2 | HEART RATE: 65 BPM | SYSTOLIC BLOOD PRESSURE: 134 MMHG | TEMPERATURE: 97.8 F | HEIGHT: 75 IN | RESPIRATION RATE: 16 BRPM | OXYGEN SATURATION: 95 %

## 2024-04-22 DIAGNOSIS — N20.1 URETERAL STONE: ICD-10-CM

## 2024-04-22 DIAGNOSIS — N20.1 CALCULUS OF URETER: ICD-10-CM

## 2024-04-22 PROCEDURE — 25010000002 ONDANSETRON PER 1 MG: Performed by: NURSE ANESTHETIST, CERTIFIED REGISTERED

## 2024-04-22 PROCEDURE — 76000 FLUOROSCOPY <1 HR PHYS/QHP: CPT

## 2024-04-22 PROCEDURE — 82365 CALCULUS SPECTROSCOPY: CPT | Performed by: UROLOGY

## 2024-04-22 PROCEDURE — 25010000002 GLYCOPYRROLATE 0.2 MG/ML SOLUTION: Performed by: NURSE ANESTHETIST, CERTIFIED REGISTERED

## 2024-04-22 PROCEDURE — 25010000002 DEXAMETHASONE PER 1 MG: Performed by: NURSE ANESTHETIST, CERTIFIED REGISTERED

## 2024-04-22 PROCEDURE — 25010000002 KETOROLAC TROMETHAMINE PER 15 MG: Performed by: NURSE ANESTHETIST, CERTIFIED REGISTERED

## 2024-04-22 PROCEDURE — 25010000002 PROPOFOL 200 MG/20ML EMULSION: Performed by: NURSE ANESTHETIST, CERTIFIED REGISTERED

## 2024-04-22 PROCEDURE — C1894 INTRO/SHEATH, NON-LASER: HCPCS | Performed by: UROLOGY

## 2024-04-22 PROCEDURE — 25810000003 LACTATED RINGERS PER 1000 ML: Performed by: UROLOGY

## 2024-04-22 PROCEDURE — 25010000002 FENTANYL CITRATE (PF) 50 MCG/ML SOLUTION: Performed by: NURSE ANESTHETIST, CERTIFIED REGISTERED

## 2024-04-22 PROCEDURE — C2617 STENT, NON-COR, TEM W/O DEL: HCPCS | Performed by: UROLOGY

## 2024-04-22 PROCEDURE — 25010000002 CEFAZOLIN PER 500 MG: Performed by: UROLOGY

## 2024-04-22 PROCEDURE — C1769 GUIDE WIRE: HCPCS | Performed by: UROLOGY

## 2024-04-22 PROCEDURE — 0 IOHEXOL 300 MG/ML SOLUTION: Performed by: UROLOGY

## 2024-04-22 PROCEDURE — C1758 CATHETER, URETERAL: HCPCS | Performed by: UROLOGY

## 2024-04-22 DEVICE — VARIABLE LENGTH URETERAL STENT
Type: IMPLANTABLE DEVICE | Site: URETER | Status: FUNCTIONAL
Brand: CONTOUR VL™

## 2024-04-22 RX ORDER — OXYCODONE AND ACETAMINOPHEN 7.5; 325 MG/1; MG/1
1 TABLET ORAL EVERY 8 HOURS PRN
Qty: 20 TABLET | Refills: 0 | Status: SHIPPED | OUTPATIENT
Start: 2024-04-22

## 2024-04-22 RX ORDER — ONDANSETRON 2 MG/ML
4 INJECTION INTRAMUSCULAR; INTRAVENOUS ONCE AS NEEDED
Status: COMPLETED | OUTPATIENT
Start: 2024-04-22 | End: 2024-04-22

## 2024-04-22 RX ORDER — LIDOCAINE HYDROCHLORIDE 10 MG/ML
0.5 INJECTION, SOLUTION INFILTRATION; PERINEURAL ONCE AS NEEDED
Status: DISCONTINUED | OUTPATIENT
Start: 2024-04-22 | End: 2024-04-22 | Stop reason: HOSPADM

## 2024-04-22 RX ORDER — ALBUTEROL SULFATE 2.5 MG/3ML
2.5 SOLUTION RESPIRATORY (INHALATION) ONCE AS NEEDED
Status: DISCONTINUED | OUTPATIENT
Start: 2024-04-22 | End: 2024-04-22 | Stop reason: HOSPADM

## 2024-04-22 RX ORDER — LABETALOL HYDROCHLORIDE 5 MG/ML
5 INJECTION, SOLUTION INTRAVENOUS
Status: DISCONTINUED | OUTPATIENT
Start: 2024-04-22 | End: 2024-04-22 | Stop reason: HOSPADM

## 2024-04-22 RX ORDER — ONDANSETRON 2 MG/ML
INJECTION INTRAMUSCULAR; INTRAVENOUS AS NEEDED
Status: DISCONTINUED | OUTPATIENT
Start: 2024-04-22 | End: 2024-04-22

## 2024-04-22 RX ORDER — ONDANSETRON 2 MG/ML
4 INJECTION INTRAMUSCULAR; INTRAVENOUS ONCE AS NEEDED
Status: DISCONTINUED | OUTPATIENT
Start: 2024-04-22 | End: 2024-04-22 | Stop reason: HOSPADM

## 2024-04-22 RX ORDER — SODIUM CHLORIDE, SODIUM LACTATE, POTASSIUM CHLORIDE, CALCIUM CHLORIDE 600; 310; 30; 20 MG/100ML; MG/100ML; MG/100ML; MG/100ML
1000 INJECTION, SOLUTION INTRAVENOUS CONTINUOUS
Status: DISCONTINUED | OUTPATIENT
Start: 2024-04-22 | End: 2024-04-22 | Stop reason: HOSPADM

## 2024-04-22 RX ORDER — FENTANYL CITRATE 50 UG/ML
50 INJECTION, SOLUTION INTRAMUSCULAR; INTRAVENOUS
Status: DISCONTINUED | OUTPATIENT
Start: 2024-04-22 | End: 2024-04-22 | Stop reason: HOSPADM

## 2024-04-22 RX ORDER — GLYCOPYRROLATE 0.2 MG/ML
INJECTION INTRAMUSCULAR; INTRAVENOUS AS NEEDED
Status: DISCONTINUED | OUTPATIENT
Start: 2024-04-22 | End: 2024-04-22 | Stop reason: SURG

## 2024-04-22 RX ORDER — PROPOFOL 10 MG/ML
INJECTION, EMULSION INTRAVENOUS AS NEEDED
Status: DISCONTINUED | OUTPATIENT
Start: 2024-04-22 | End: 2024-04-22 | Stop reason: SURG

## 2024-04-22 RX ORDER — KETOROLAC TROMETHAMINE 30 MG/ML
INJECTION, SOLUTION INTRAMUSCULAR; INTRAVENOUS AS NEEDED
Status: DISCONTINUED | OUTPATIENT
Start: 2024-04-22 | End: 2024-04-22 | Stop reason: SURG

## 2024-04-22 RX ORDER — DEXAMETHASONE SODIUM PHOSPHATE 4 MG/ML
INJECTION, SOLUTION INTRA-ARTICULAR; INTRALESIONAL; INTRAMUSCULAR; INTRAVENOUS; SOFT TISSUE AS NEEDED
Status: DISCONTINUED | OUTPATIENT
Start: 2024-04-22 | End: 2024-04-22 | Stop reason: SURG

## 2024-04-22 RX ORDER — ACETAMINOPHEN 325 MG/1
650 TABLET ORAL ONCE AS NEEDED
Status: DISCONTINUED | OUTPATIENT
Start: 2024-04-22 | End: 2024-04-22 | Stop reason: HOSPADM

## 2024-04-22 RX ORDER — DIPHENHYDRAMINE HYDROCHLORIDE 50 MG/ML
12.5 INJECTION INTRAMUSCULAR; INTRAVENOUS
Status: DISCONTINUED | OUTPATIENT
Start: 2024-04-22 | End: 2024-04-22 | Stop reason: HOSPADM

## 2024-04-22 RX ORDER — SODIUM CHLORIDE 0.9 % (FLUSH) 0.9 %
10 SYRINGE (ML) INJECTION AS NEEDED
Status: DISCONTINUED | OUTPATIENT
Start: 2024-04-22 | End: 2024-04-22 | Stop reason: HOSPADM

## 2024-04-22 RX ORDER — HYDRALAZINE HYDROCHLORIDE 20 MG/ML
5 INJECTION INTRAMUSCULAR; INTRAVENOUS
Status: DISCONTINUED | OUTPATIENT
Start: 2024-04-22 | End: 2024-04-22 | Stop reason: HOSPADM

## 2024-04-22 RX ORDER — LIDOCAINE HYDROCHLORIDE 20 MG/ML
INJECTION, SOLUTION EPIDURAL; INFILTRATION; INTRACAUDAL; PERINEURAL AS NEEDED
Status: DISCONTINUED | OUTPATIENT
Start: 2024-04-22 | End: 2024-04-22 | Stop reason: SURG

## 2024-04-22 RX ORDER — ACETAMINOPHEN 120 MG/1
120 SUPPOSITORY RECTAL EVERY 4 HOURS PRN
Status: DISCONTINUED | OUTPATIENT
Start: 2024-04-22 | End: 2024-04-22 | Stop reason: HOSPADM

## 2024-04-22 RX ORDER — NALOXONE HCL 0.4 MG/ML
0.4 VIAL (ML) INJECTION AS NEEDED
Status: DISCONTINUED | OUTPATIENT
Start: 2024-04-22 | End: 2024-04-22 | Stop reason: HOSPADM

## 2024-04-22 RX ORDER — NITROFURANTOIN 25; 75 MG/1; MG/1
100 CAPSULE ORAL 2 TIMES DAILY
Qty: 14 CAPSULE | Refills: 0 | Status: SHIPPED | OUTPATIENT
Start: 2024-04-22 | End: 2024-05-02

## 2024-04-22 RX ORDER — HYDROCODONE BITARTRATE AND ACETAMINOPHEN 5; 325 MG/1; MG/1
1 TABLET ORAL ONCE AS NEEDED
Status: DISCONTINUED | OUTPATIENT
Start: 2024-04-22 | End: 2024-04-22 | Stop reason: HOSPADM

## 2024-04-22 RX ADMIN — FENTANYL CITRATE 50 MCG: 50 INJECTION, SOLUTION INTRAMUSCULAR; INTRAVENOUS at 07:45

## 2024-04-22 RX ADMIN — PROPOFOL 200 MG: 10 INJECTION, EMULSION INTRAVENOUS at 07:45

## 2024-04-22 RX ADMIN — DEXAMETHASONE SODIUM PHOSPHATE 4 MG: 4 INJECTION, SOLUTION INTRA-ARTICULAR; INTRALESIONAL; INTRAMUSCULAR; INTRAVENOUS; SOFT TISSUE at 07:53

## 2024-04-22 RX ADMIN — KETOROLAC TROMETHAMINE 30 MG: 30 INJECTION, SOLUTION INTRAMUSCULAR at 08:10

## 2024-04-22 RX ADMIN — FENTANYL CITRATE 50 MCG: 50 INJECTION, SOLUTION INTRAMUSCULAR; INTRAVENOUS at 07:54

## 2024-04-22 RX ADMIN — SODIUM CHLORIDE, POTASSIUM CHLORIDE, SODIUM LACTATE AND CALCIUM CHLORIDE 1000 ML: 600; 310; 30; 20 INJECTION, SOLUTION INTRAVENOUS at 06:27

## 2024-04-22 RX ADMIN — CEFAZOLIN 2 G: 2 INJECTION, POWDER, FOR SOLUTION INTRAMUSCULAR; INTRAVENOUS at 07:48

## 2024-04-22 RX ADMIN — GLYCOPYRROLATE 0.1 MG: 0.2 INJECTION INTRAMUSCULAR; INTRAVENOUS at 07:42

## 2024-04-22 RX ADMIN — LIDOCAINE HYDROCHLORIDE 100 MG: 20 INJECTION, SOLUTION EPIDURAL; INFILTRATION; INTRACAUDAL; PERINEURAL at 07:43

## 2024-04-22 RX ADMIN — ONDANSETRON 4 MG: 2 INJECTION INTRAMUSCULAR; INTRAVENOUS at 07:55

## 2024-04-22 NOTE — ANESTHESIA POSTPROCEDURE EVALUATION
Patient: Gil Garcia    Procedure Summary       Date: 04/22/24 Room / Location: [facility] OR 09 / [facility] MAIN OR    Anesthesia Start: 0738 Anesthesia Stop: 0830    Procedure: CYSTOSCOPY URETEROSCOPY LASER LITHOTRIPSY, STONE BASKET EXTRACTION, STENT INSERTION (Left) Diagnosis:       Calculus of ureter      (Calculus of ureter [N20.1])    Surgeons: Tejas Meyer MD Provider: Davion Kline MD    Anesthesia Type: general ASA Status: 3            Anesthesia Type: general    Vitals  Vitals Value Taken Time   /95 04/22/24 0912   Temp 97.6 °F (36.4 °C) 04/22/24 0912   Pulse 69 04/22/24 0914   Resp 10 04/22/24 0912   SpO2 95 % 04/22/24 0914   Vitals shown include unfiled device data.        Post Anesthesia Care and Evaluation    Patient location during evaluation: PACU  Patient participation: complete - patient participated  Level of consciousness: awake  Pain scale: See nurse's notes for pain score.  Pain management: adequate    Airway patency: patent  Anesthetic complications: No anesthetic complications  PONV Status: none  Cardiovascular status: acceptable  Respiratory status: acceptable and spontaneous ventilation  Hydration status: acceptable    Comments: Patient seen and examined postoperatively; vital signs stable; SpO2 greater than or equal to 90%; cardiopulmonary status stable; nausea/vomiting adequately controlled; pain adequately controlled; no apparent anesthesia complications; patient discharged from anesthesia care when discharge criteria were met

## 2024-04-22 NOTE — ANESTHESIA PREPROCEDURE EVALUATION
Anesthesia Evaluation     Patient summary reviewed and Nursing notes reviewed   no history of anesthetic complications:   NPO Solid Status: > 8 hours  NPO Liquid Status: > 8 hours           Airway   Mallampati: II  TM distance: >3 FB  Neck ROM: full  No difficulty expected  Dental      Comment: Denies loose teeth      Pulmonary - normal exam   (+) a smoker Former,  Cardiovascular - normal exam    (+) past MI  >12 months, CAD, hyperlipidemia      Neuro/Psych- negative ROS  GI/Hepatic/Renal/Endo    (+) renal disease- stones    Musculoskeletal (-) negative ROS    Abdominal  - normal exam   Substance History - negative use     OB/GYN negative ob/gyn ROS         Other                          Anesthesia Plan    ASA 3     general     intravenous induction     Anesthetic plan, risks, benefits, and alternatives have been provided, discussed and informed consent has been obtained with: patient.    Plan discussed with CRNA.        CODE STATUS:

## 2024-04-22 NOTE — H&P
Urology History and Physical    Patient Identification:  Name:  Gil Garcia  Age:  68 y.o.  Sex:  male  :  1955  MRN:  [id number]    Chief Complaint:  here for stones    History of Present Illness:      69 yo with left renal and ureteral stones s/p stent  Minimal stent colic  No dysuria  Here for URS     Problem List:  [unfilled]  Past Medical History:  Past Medical History:   Diagnosis Date    Coronary artery disease involving coronary bypass graft of native heart with unstable angina pectoris 2021    Hyperlipidemia     Kidney stone     Myocardial infarction 2021    UTI (urinary tract infection)      Past Surgical History:  Past Surgical History:   Procedure Laterality Date    APPENDECTOMY      CARDIAC CATHETERIZATION N/A 08/15/2021    Procedure: Left Heart Cath;  Surgeon: Miky Cruz MD;  Location:  [facility];  Service: Cardiology;  Laterality: N/A;    CARDIAC CATHETERIZATION N/A 08/15/2021    Procedure: Percutaneous Coronary Intervention;  Surgeon: Miky Cruz MD;  Location:  [facility];  Service: Cardiology;  Laterality: N/A;    CORONARY STENT PLACEMENT  08/15/2021    KIDNEY STONE SURGERY       Home Meds:  Medications Prior to Admission   Medication Sig Dispense Refill Last Dose    atorvastatin (LIPITOR) 20 MG tablet Take 1 tablet by mouth Daily. (Patient taking differently: Take 1 tablet by mouth Daily. Has not restarted yet, refilling) 90 tablet 3 2024    cephalexin (KEFLEX) 500 MG capsule Take 1 capsule by mouth 3 (Three) Times a Day.   2024    metoprolol succinate XL (TOPROL-XL) 25 MG 24 hr tablet Take 1 tablet by mouth Daily. Has not started back yet- refilling prescription   2024    Phenazopyridine HCl (AZO URINARY PAIN RELIEF PO) Take 2 tablets by mouth 3 times a day.   Past Week    HYDROcodone-acetaminophen (NORCO) 7.5-325 MG per tablet Take 1-2 tablets by mouth Every 6 (Six) Hours As Needed for Moderate  "Pain. 15 tablet 0 More than a month     Current Meds:    Current Facility-Administered Medications:     ceFAZolin 2000 mg IVPB in 100 mL NS (MBP), 2,000 mg, Intravenous, Once, Tejas Meyer MD    lactated ringers infusion 1,000 mL, 1,000 mL, Intravenous, Continuous, Tejas Meyer MD, Last Rate: 25 mL/hr at 24, 1,000 mL at 24  Allergies:  Patient has no known allergies.    Review of Systems:  Negative 12 point system review except: none    Objective:  tMax 24 hours:  Temp (24hrs), Av.2 °F (36.8 °C), Min:98.2 °F (36.8 °C), Max:98.2 °F (36.8 °C)    Vital Sign Ranges:  Temp:  [98.2 °F (36.8 °C)] 98.2 °F (36.8 °C)  Heart Rate:  [75] 75  Resp:  [26] 26  BP: (139)/(87) 139/87  Intake and Output Last 3 Shifts:  No intake/output data recorded.    Exam:  /87 (BP Location: Left arm, Patient Position: Sitting)   Pulse 75   Temp 98.2 °F (36.8 °C) (Oral)   Resp 26   Ht 190.5 cm (75\")   Wt 92.4 kg (203 lb 9.6 oz)   SpO2 96%   BMI 25.45 kg/m²    General Appearance:    Alert, cooperative, no acute distress, general       appearance is normal   Head:    Normocephalic, without obvious abnormality, atraumatic   Eyes:            Pupils/Irises normal. Exterior inspection conjunctivae       and lids normal.   Ears:    Normal external inspection   Nose:   Exterior inspection of nose is normal   Throat:   Lips, mucosa, and tongue normal   Neck:   No adenopathy, supple, symmetrical, trachea midline   Back:     No CVA tenderness   Lungs:     Respirations unlabored; normal effort, no audible       abnormality   CV:   Regular rhythm and normal rate, no edema   Abdomen:     No hernia, examination of the abdomen is normal with     no  masses, tenderness, or distension    Male :  normal    Musculoskeletal:   Inspection of the nails and digits reveal no abnormalities   Skin:   Inspection normal, palpation normal   Neurologic/Psych:   Orientation normal; Mood/Affect normal     Data Review:  All " labs (24hrs):    Lab Results (last 24 hours)       ** No results found for the last 24 hours. **          Radiology:   Imaging Results (Last 72 Hours)       ** No results found for the last 72 hours. **            Assessment:  Active Problems:    * No active hospital problems. *    Left Renal and Ureteral Stones    Plan:  1. Proceed with Left URS/LL/SBE/Stent exchange    Tejas Meyer MD  4/22/2024  07:40 EDT

## 2024-04-22 NOTE — ANESTHESIA PROCEDURE NOTES
Airway  Urgency: elective    Date/Time: 4/22/2024 7:45 AM    General Information and Staff    Patient location during procedure: OR    Indications and Patient Condition  Indications for airway management: airway protection    Preoxygenated: yes  MILS maintained throughout  Mask difficulty assessment: 0 - not attempted    Final Airway Details  Final airway type: supraglottic airway      Successful airway: I-gel  Size 5     Number of attempts at approach: 1  Assessment: lips, teeth, and gum same as pre-op and atraumatic intubation

## 2024-04-23 NOTE — PROGRESS NOTES
Cardiology Clinic Note  Miky Cruz MD, PhD    Subjective:     Encounter Date:04/12/2024      Patient ID: Gil Garcia is a 68 y.o. male.    Chief Complaint:  Chief Complaint   Patient presents with    Follow-up       HPI:    I the pleasure to see this 68-year-old gentleman well-known to me from hospitalization with history of NSTEMI 2021, culprit was OM leading back to circumflex requiring overlapping Xience drug-eluting stents with great angiographic results at that time, EF was 50% with mild lateral wall hypokinesis, limited injury with peak troponin of 2.  His LAD and diagonal did have some disease at the bifurcation, LAD is diffusely diseased with luminal regularities throughout possibly 30 to 40% with ostial  diagonal possibly 80% but both vessels with SERAFIN-3 flow.  Since his hospitalization he has had no further chest discomfort, he is managed with routine follow-up since this time.  For evaluation of his LAD and diagonal branch, we did a stress test demonstrating no anterior reversibility by the lateral wall infarct consistent with his NSTEMI with mild anahi-infarct ischemia.  We did discuss secondary prevention goals.  He is on moderate intensity statin with significant myalgias and joint pains on higher doses.  We discussed PCSK9 inhibitor therapy with indications if were unable to achieve LDLs less than 70 opted less than 55.      Smoking abstinence discussed, he continues to be abstinent which is good  Nondiabetic  Continue aspirin, off P2 Y12 inhibitor    Perioperative evaluation for lithotripsy with kidney stone possible extraction  Does not need ischemic evaluation prior to noncardiovascular surgery in this instance, low risk surgery  He is able to do multiple flights of stairs without any anginal chest pain, preserved EF with no clinical heart failure     Review of systems negative x14 point review of systems except as mentioned above     Historical data copied forward from previous encounters in  EMR is unchanged     Left heart catheterization is reviewed interpreted by me demonstrates the following:  =============================================================  Conclusion      Miky Cruz MD, PhD  [facility]  Date of service 8-15-21     Procedure  1.  Left heart catheterization with coronary angiography left ventriculography in PEREZ position  2.  Percutaneous coronary mention with overlapping Xience drug-eluting stents to the obtuse marginal branch back to proximal circumflex, 2.5 x 28 and 3.0 x 28 stents postdilated to 3.3 and 3.6 mm at high pressure respectively with 3.5 mm NC balloon     Indication  Non-ST elevation microinfarction  99% stenosis of the proximal first obtuse marginal branch with lesion extending back into the body of the circumflex, proximal circumflex 60% hazy thrombotic appearing lesion as well, decision was made to cover both lesions     After informed consent the patient is brought to the catheterization lab sterilely prepped and draped in usual fashion was placed in the right groin for right common femoral arterial access via micropuncture modified standard technique with placement of a 6 French sheath under fluoroscopic guidance which was aspirated flushed with heparinized saline.  An 035 guidewire was advanced level aortic valve followed by diagnostic JL4 and JR4 6 French catheters for selective left and right coronary angiography respectively.  The JR4 was used across aortic valve followed by an injection under low pressure for LV gram, EDP assessment and pullback assessment transaortic valve gradient were also obtained.  Secondary to findings of culprit lesion in the obtuse marginal branch and circumflex decision was made to intervene despite diffuse nonobstructive but borderline disease elsewhere, patient was heparinized with 90 units/kg after ACT, ACT was kept 250 or greater with repeated boluses of heparin as needed intravenously.  EBU 6  Bel[place]ian 4.0 guide was used engage left main, run-through wire was placed in the distal obtuse marginal branch over which a 2.5 x 12 NC balloon was used to predilate the lesion, this was then covered from the proximal portion of the obtuse marginal branch into the mid circumflex with 2.5 x 28 Xience drug-eluting stent deployed at 12 corry x 2 inflations followed by pullback of the stent balloon and postdilatation up to 20 corry with the stent balloon, further postdilatation was obtained with 3.0 x 15 NC balloon up to 20 corry avoiding the distal edge.  Lesion in the proximal circumflex was seen to be hazy and thrombotic with streaming and decision was made to cover this, an additional 3.0 x 28 Xience drug-eluting stent was overlapped with the aforementioned stent from the mid circumflex back to the proximal circumflex deployed at 12 to 14 corry on 2 inflations followed by advancing the stent balloon to the overlapping portion up to 20 corry, next this was exchanged for 3.5 x 15 NC balloon which was used to further post dilate the entire stented segment distally up to 14 corry and proximally up to 20 corry.  Final angiography revealed SERAFIN-3 flow to the circumflex and obtuse marginal with much improvement and much larger caliber obtuse marginal branch that on initiation of the procedure with restoration of SERAFIN-3 flow from slightly less than SERAFIN-3 flow, 99% stenosis reduced to 0% residual, proximal 60% reduced to 0% residual.  Intracoronary nitroglycerin was administered to the procedure secondary to age induced spasm on both ends which resolved with intracoronary nitroglycerin, there was no edge dissection no edge issue no distal wire trauma, patient remained hemodynamically likely stable throughout the entire to the procedure.  All catheters and equipment were also removed after final angiography, 6 [place]ian Angio-Seal was used to close right common femoral arteriotomy with immediate hemostasis obtained distal pulses after right  femoral angiogram.  Patient left the Cath Lab chest pain-free hemodynamically likely stable alert neurologically intact talking to staff.     Complications none  Blood loss less than 10 cc  Contrast 225 cc including diagnostic and interventional portion of procedure  Moderate conscious sedation time of 60 minutes with IV Versed and fentanyl administered by registered nurse with complete ECG pulse oximetry and hemodynamic body throughout the entire the case observed by me     Findings  1 opening at a pressure of 98/57  2.  Closing pressure was 114/61  3.  LVEDP 12 to 14 mmHg   #4 LV function low normal 50%  5.  No transaortic gradient        Angiography  1.  Left main nonobstructive disease less than 10 to 20% mild luminal regularities  2.  LAD diffusely diseased proximally at the takeoff of a high diagonal branch there is a 40% narrowing, there is 30 to 40% diffuse luminal regularities throughout the proximal mid into the distal LAD with diffuse atherosclerosis but nothing is obstructive, most severe lesion is again 50% in the proximal LAD flanking the bifurcation of the diagonal branch, ostium of the diagonal branch difficult to lay out but appears to have 70% stenosis, the body and distal portion of diagonal diffuse limb irregularities less than 30% with SERAFIN-3 flow, SERAFIN-3 flow in the LAD and diagonal branch and septals, LAD wraps around the apex  3.  Circumflex is a nondominant vessel with a large obtuse marginal with 4 branches distally along the lateral wall, proximal circumflex has diffuse limb regularities, proximally 50 to 60% eccentric plaque, distally the continuation circumflex is very small after the takeoff of this very large obtuse marginal branch which is the dominant supply of the lateral wall.  Proximal obtuse marginal branch has 99% eccentric stenosis with slightly less and SERAFIN-3 flow distally.  Distal to this lesion there is not appear to be flow-limiting stenosis of the obtuse marginal branch  or right subsegmental branches.  4.  The RCA is a dominant branch with PDA and PLV branch distally, the RCA has diffuse atherosclerotic disease 40% with diffuse luminal irregularities throughout the PDA and PLV branches but nothing obstructive, nothing greater than 30% or so.  SERAFIN-3 flow throughout     Conclusions and recommendations next   #1 non-ST elevation myocardial infarction with culprit of mid circumflex into first obtuse marginal branch 99% stenosis  2.  Successful intervention described above with placement of overlapping Xience drug-eluting stents 2.5 x 28 and 3.0 x 28 from the obtuse marginal branch back to proximal circumflex postdilated with 3.5 mm NC balloon up to 18 to 20 corry proximally.    #3 dual endplate with aspirin Brilinta  4.  Advance goal-directed medical therapy after acute MI as indicated by guidelines  5.  Discussed heart healthy diet with the patient at length along with diet and exercise when released by cardiology ultimately per AHA guidelines  6.  High intensity statin therapy will be pursued  7.  Patient be discharged once discharge criteria met after standard close observation from complications status post acute MI     Miky Cruz MD, PhD      2D echo was reviewed interpreted by me demonstrates the following  Interpretation Summary        Estimated left ventricular EF was in agreement with the calculated left ventricular EF. Left ventricular ejection fraction appears to be 51 - 55%. Left ventricular systolic function is normal.  Left ventricular diastolic function was normal.  Estimated right ventricular systolic pressure from tricuspid regurgitation is normal (<35 mmHg).     Overall EF 55%  Regional abnormalities noted with hypokinesis of the lateral wall mild to moderate  Normal diastolic function by criteria  Normal atrial sizes  No significant valvular abnormality identified  Normal PA systolic pressure  No masses or effusions                 Objective:         /80 (BP  "Location: Left arm, Patient Position: Sitting)   Pulse 111   Resp 18   Ht 190.5 cm (75\")   Wt 90.3 kg (199 lb)   BMI 24.87 kg/m²     Physical Exam  Regular rate and rhythm with no rubs murmurs or gallops  No heave no lift  No clubbing cyanosis or edema  Intact grossly  Clear to auscultation  No carotid bruits or JVD  Assessment:       Independently manage medical conditions    Coronary artery disease status post PCI 2021  History of non-STEMI  Hypertension  Hyperlipidemia  Secondary prevention goals  Perioperative evaluation  Statin intolerance    Diagnoses and all orders for this visit:    1. Coronary artery disease involving coronary bypass graft of native heart with unstable angina pectoris (Primary)  -     Lipid Panel; Future  -     Comprehensive Metabolic Panel; Future  -     Magnesium; Future  -     CBC (No Diff); Future    Continue aspirin  Mod intensity statin, repeat LDL and fasting lipid panels intermittently, if unable to achieve LDL less than 70 would recommend Zetia 10 mg daily and ultimately PCSK9 if unable to reach targets  Secondary prevention goals discussed  Continue beta-blocker with metoprolol  Blood pressures are stable, no reduced EF, does not warrant ARB therapy  No clinical heart failure  Not on loop diuretics  Continue smoking abstinence  Repeat labs in 3 months    Recent EKG reviewed sinus rhythm with PACs, right bundle branch block and left intrafascicular block unchanged from prior    Okay to follow-up with us in 6 months to year       Plan:              The pleasure to be involved in this patient's cardiovascular care.  Please call with any questions or concerns  Miky Cruz MD, PhD    Most recent EKG as reviewed and interpreted by me:  Procedures     Most recent echo as reviewed and interpreted by me:  Results for orders placed during the hospital encounter of 08/14/21    Adult Transthoracic Echo Complete W/ Cont if Necessary Per Protocol    Interpretation Summary  · Estimated " left ventricular EF was in agreement with the calculated left ventricular EF. Left ventricular ejection fraction appears to be 51 - 55%. Left ventricular systolic function is normal.  · Left ventricular diastolic function was normal.  · Estimated right ventricular systolic pressure from tricuspid regurgitation is normal (<35 mmHg).    Overall EF 55%  Regional abnormalities noted with hypokinesis of the lateral wall mild to moderate  Normal diastolic function by criteria  Normal atrial sizes  No significant valvular abnormality identified  Normal PA systolic pressure  No masses or effusions      Most recent stress test as reviewed and interpreted by me:  Results for orders placed during the hospital encounter of 10/05/21    Stress Test With Myocardial Perfusion One Day    Interpretation Summary  · Left ventricular ejection fraction is normal. (Calculated EF = 59%).  · Myocardial perfusion imaging indicates a medium-sized infarct located in the lateral wall with mild anahi-infarct ischemia.  · There is no prior study available for comparison.  · Impressions are consistent with a low risk study.  · Findings consistent with a normal ECG stress test.    Normal stress ECG, restarted heart rate, no ischemic changes at maximal stress or recovery  Occasional PVCs at peak stress and recovery  No significant tachyarrhythmias  Nuclear perfusion imaging demonstrates decreased uptake in the inferolateral wall at stress and rest consistent with small to moderate inferolateral infarct, there is mild anahi-infarct reversibility, summed difference score of 3 with summed stress score of 8 and summed rest score of 7.    Abnormal study  Inferolateral infarct  Mild anahi-infarct ischemia  Low to intermediate risk study      Most recent cardiac catheterization as reviewed interpreted by me:  Results for orders placed during the hospital encounter of 08/14/21    Cardiac Catheterization/Vascular Study    Narrative   Miky  MD Anthony, PhD  [facility] cardiology  Date of service 8-15-21    Procedure  1.  Left heart catheterization with coronary angiography left ventriculography in PEREZ position  2.  Percutaneous coronary mention with overlapping Xience drug-eluting stents to the obtuse marginal branch back to proximal circumflex, 2.5 x 28 and 3.0 x 28 stents postdilated to 3.3 and 3.6 mm at high pressure respectively with 3.5 mm NC balloon    Indication  Non-ST elevation microinfarction  99% stenosis of the proximal first obtuse marginal branch with lesion extending back into the body of the circumflex, proximal circumflex 60% hazy thrombotic appearing lesion as well, decision was made to cover both lesions    After informed consent the patient is brought to the catheterization lab sterilely prepped and draped in usual fashion was placed in the right groin for right common femoral arterial access via micropuncture modified standard technique with placement of a 6 French sheath under fluoroscopic guidance which was aspirated flushed with heparinized saline.  An 035 guidewire was advanced level aortic valve followed by diagnostic JL4 and JR4 6 French catheters for selective left and right coronary angiography respectively.  The JR4 was used across aortic valve followed by an injection under low pressure for LV gram, EDP assessment and pullback assessment transaortic valve gradient were also obtained.  Secondary to findings of culprit lesion in the obtuse marginal branch and circumflex decision was made to intervene despite diffuse nonobstructive but borderline disease elsewhere, patient was heparinized with 90 units/kg after ACT, ACT was kept 250 or greater with repeated boluses of heparin as needed intravenously.  EBU 6 French 4.0 guide was used engage left main, run-through wire was placed in the distal obtuse marginal branch over which a 2.5 x 12 NC balloon was used to predilate the lesion, this was then covered from the proximal  portion of the obtuse marginal branch into the mid circumflex with 2.5 x 28 Xience drug-eluting stent deployed at 12 corry x 2 inflations followed by pullback of the stent balloon and postdilatation up to 20 corry with the stent balloon, further postdilatation was obtained with 3.0 x 15 NC balloon up to 20 corry avoiding the distal edge.  Lesion in the proximal circumflex was seen to be hazy and thrombotic with streaming and decision was made to cover this, an additional 3.0 x 28 Xience drug-eluting stent was overlapped with the aforementioned stent from the mid circumflex back to the proximal circumflex deployed at 12 to 14 corry on 2 inflations followed by advancing the stent balloon to the overlapping portion up to 20 corry, next this was exchanged for 3.5 x 15 NC balloon which was used to further post dilate the entire stented segment distally up to 14 corry and proximally up to 20 corry.  Final angiography revealed SERAFIN-3 flow to the circumflex and obtuse marginal with much improvement and much larger caliber obtuse marginal branch that on initiation of the procedure with restoration of SERAFIN-3 flow from slightly less than SERAFIN-3 flow, 99% stenosis reduced to 0% residual, proximal 60% reduced to 0% residual.  Intracoronary nitroglycerin was administered to the procedure secondary to age induced spasm on both ends which resolved with intracoronary nitroglycerin, there was no edge dissection no edge issue no distal wire trauma, patient remained hemodynamically likely stable throughout the entire to the procedure.  All catheters and equipment were also removed after final angiography, 6 Urdu Angio-Seal was used to close right common femoral arteriotomy with immediate hemostasis obtained distal pulses after right femoral angiogram.  Patient left the Cath Lab chest pain-free hemodynamically likely stable alert neurologically intact talking to staff.    Complications none  Blood loss less than 10 cc  Contrast 225 cc including  diagnostic and interventional portion of procedure  Moderate conscious sedation time of 60 minutes with IV Versed and fentanyl administered by registered nurse with complete ECG pulse oximetry and hemodynamic body throughout the entire the case observed by me    Findings  1 opening at a pressure of 98/57  2.  Closing pressure was 114/61  3.  LVEDP 12 to 14 mmHg  #4 LV function low normal 50%  5.  No transaortic gradient      Angiography  1.  Left main nonobstructive disease less than 10 to 20% mild luminal regularities  2.  LAD diffusely diseased proximally at the takeoff of a high diagonal branch there is a 40% narrowing, there is 30 to 40% diffuse luminal regularities throughout the proximal mid into the distal LAD with diffuse atherosclerosis but nothing is obstructive, most severe lesion is again 50% in the proximal LAD flanking the bifurcation of the diagonal branch, ostium of the diagonal branch difficult to lay out but appears to have 70% stenosis, the body and distal portion of diagonal diffuse limb irregularities less than 30% with SERAFIN-3 flow, SERAFIN-3 flow in the LAD and diagonal branch and septals, LAD wraps around the apex  3.  Circumflex is a nondominant vessel with a large obtuse marginal with 4 branches distally along the lateral wall, proximal circumflex has diffuse limb regularities, proximally 50 to 60% eccentric plaque, distally the continuation circumflex is very small after the takeoff of this very large obtuse marginal branch which is the dominant supply of the lateral wall.  Proximal obtuse marginal branch has 99% eccentric stenosis with slightly less and SERAFIN-3 flow distally.  Distal to this lesion there is not appear to be flow-limiting stenosis of the obtuse marginal branch or right subsegmental branches.  4.  The RCA is a dominant branch with PDA and PLV branch distally, the RCA has diffuse atherosclerotic disease 40% with diffuse luminal irregularities throughout the PDA and PLV branches  but nothing obstructive, nothing greater than 30% or so.  SERAFIN-3 flow throughout    Conclusions and recommendations next  #1 non-ST elevation myocardial infarction with culprit of mid circumflex into first obtuse marginal branch 99% stenosis  2.  Successful intervention described above with placement of overlapping Xience drug-eluting stents 2.5 x 28 and 3.0 x 28 from the obtuse marginal branch back to proximal circumflex postdilated with 3.5 mm NC balloon up to 18 to 20 corry proximally.  #3 dual endplate with aspirin Brilinta  4.  Advance goal-directed medical therapy after acute MI as indicated by guidelines  5.  Discussed heart healthy diet with the patient at length along with diet and exercise when released by cardiology ultimately per AHA guidelines  6.  High intensity statin therapy will be pursued  7.  Patient be discharged once discharge criteria met after standard close observation from complications status post acute MI    Miky Cruz MD, PhD    The following portions of the patient's history were reviewed and updated as appropriate: allergies, current medications, past family history, past medical history, past social history, past surgical history, and problem list.      ROS:  14 point review of systems negative except as mentioned above    Current Outpatient Medications:     atorvastatin (LIPITOR) 20 MG tablet, Take 1 tablet by mouth Daily., Disp: 90 tablet, Rfl: 3    metoprolol succinate XL (TOPROL-XL) 25 MG 24 hr tablet, Take 1 tablet by mouth Daily. Has not started back yet- refilling prescription, Disp: , Rfl:     nitrofurantoin, macrocrystal-monohydrate, (Macrobid) 100 MG capsule, Take 1 capsule by mouth 2 (Two) Times a Day for 20 doses., Disp: 14 capsule, Rfl: 0    oxyCODONE-acetaminophen (Percocet) 7.5-325 MG per tablet, Take 1 tablet by mouth Every 8 (Eight) Hours As Needed for Moderate Pain for up to 12 doses., Disp: 20 tablet, Rfl: 0    Phenazopyridine HCl (AZO URINARY PAIN RELIEF PO), Take  2 tablets by mouth 3 times a day., Disp: , Rfl:   No current facility-administered medications for this visit.    Problem List:  Patient Active Problem List   Diagnosis    COVID-19    Unstable angina    Coronary artery disease involving coronary bypass graft of native heart with unstable angina pectoris     Past Medical History:  Past Medical History:   Diagnosis Date    Coronary artery disease involving coronary bypass graft of native heart with unstable angina pectoris 2021    Hyperlipidemia     Kidney stone     Myocardial infarction 2021    UTI (urinary tract infection)      Past Surgical History:  Past Surgical History:   Procedure Laterality Date    APPENDECTOMY      CARDIAC CATHETERIZATION N/A 08/15/2021    Procedure: Left Heart Cath;  Surgeon: Miky Cruz MD;  Location:  [facility];  Service: Cardiology;  Laterality: N/A;    CARDIAC CATHETERIZATION N/A 08/15/2021    Procedure: Percutaneous Coronary Intervention;  Surgeon: Miky Cruz MD;  Location:  [facility];  Service: Cardiology;  Laterality: N/A;    CORONARY STENT PLACEMENT  08/15/2021    KIDNEY STONE SURGERY       Social History:  Social History     Socioeconomic History    Marital status:    Tobacco Use    Smoking status: Former     Current packs/day: 0.00     Average packs/day: 0.5 packs/day for 20.0 years (10.0 ttl pk-yrs)     Types: Cigarettes     Start date:      Quit date:      Years since quittin.3    Smokeless tobacco: Never   Vaping Use    Vaping status: Never Used   Substance and Sexual Activity    Alcohol use: Not Currently     Comment: occasional    Drug use: Never    Sexual activity: Defer     Allergies:  No Known Allergies  Immunizations:  Immunization History   Administered Date(s) Administered    COVID-19 (PFIZER) Purple Cap Monovalent 05/10/2021, 2021    Flu Vaccine Quad PF 6-35MO 10/20/2018    Fluzone Quad >6mos (Multi-dose)  10/23/2019    Influenza Quad Vaccine (Inpatient) 10/29/2015            In-Office Procedure(s):  No orders to display        ASCVD RIsk Score::  The ASCVD Risk score (Armando MCFADDEN, et al., 2019) failed to calculate for the following reasons:    The patient has a prior MI or stroke diagnosis    Imaging:    Results for orders placed during the hospital encounter of 08/23/21    XR Shoulder 2+ View Right    Narrative  XR SHOULDER 2+ VW RIGHT-    Date of Exam: 8/23/2021 11:40 AM    Indication: right shoulder pain; M25.511-Pain in right shoulder.    Comparison: None available.    Technique: 3 views of the shoulder were obtained.    FINDINGS:  There is no acute fracture. There is no dislocation. There is  mild AC joint arthritic change. There are no lytic or destructive bony  lesions.    Impression  No acute osseous abnormality.    Electronically Signed By-Benny Shirley MD On:8/23/2021 12:16 PM  This report was finalized on [id number] by  Benny Shirley MD.       Results for orders placed during the hospital encounter of 04/02/24    CT Abdomen Pelvis Without Contrast    Narrative  CT ABDOMEN PELVIS WO CONTRAST    Date of Exam: 4/2/2024 12:12 PM EDT    Indication: Abdominal pain, acute, nonlocalized.    Comparison: None available.    Technique: Axial CT images were obtained of the abdomen and pelvis without the administration of contrast. Sagittal and coronal reconstructions were performed.  Automated exposure control and iterative reconstruction methods were used.      Findings:  Upper slices through the lower chest reveal bibasilar atelectasis. There is coronary artery calcification.    There is no definite abnormality of the liver. The spleen and pancreas do not appear unusual. There is some fullness to the adrenal glands which could reflect benign change.    There is an obstructive uropathy on the left due to a mid left ureteral calculus measuring 5.8 mm. There are nonobstructing calculi in the lower pole left kidney  largest measures 7.3 mm. There are nonobstructing calculi in the lower pole of the right  kidney. In this area this might reflect more of a staghorn calculus measuring 1.5 x 0.7 cm. There are bilateral suggested renal lesions which could reflect cysts difficult to completely characterize on this noncontrasted exam. There is some cortical  thinning to the right kidney.    The bladder does not appear unusual for the degree of distention.    There are some colonic diverticula without change of acute diverticulitis.  Atherosclerotic vascular calcification is present.    There is some bridging along the anterior aspect of the sacroiliac joints. There is facet arthropathy within the lumbar spine.    Impression  Impression:  1.Obstructive uropathy on the left due to a mid left ureteral calculus. There are bilateral nonobstructing intrarenal calculi.  2.Bilateral renal lesions suggestive of cysts difficult completely characterize on this noncontrasted exam.  3.There is some right renal atrophy  4.Colonic diverticulosis.  5.Degenerative change lumbar spine and sacroiliac joints  6.Atherosclerotic vascular calcification  7.Bibasilar atelectasis.            Electronically Signed: Levi Otto MD  4/2/2024 12:22 PM EDT  Workstation ID: [id number]      Results for orders placed during the hospital encounter of 04/02/24    CT Abdomen Pelvis Without Contrast    Narrative  CT ABDOMEN PELVIS WO CONTRAST    Date of Exam: 4/2/2024 12:12 PM EDT    Indication: Abdominal pain, acute, nonlocalized.    Comparison: None available.    Technique: Axial CT images were obtained of the abdomen and pelvis without the administration of contrast. Sagittal and coronal reconstructions were performed.  Automated exposure control and iterative reconstruction methods were used.      Findings:  Upper slices through the lower chest reveal bibasilar atelectasis. There is coronary artery calcification.    There is no definite abnormality of the liver. The  spleen and pancreas do not appear unusual. There is some fullness to the adrenal glands which could reflect benign change.    There is an obstructive uropathy on the left due to a mid left ureteral calculus measuring 5.8 mm. There are nonobstructing calculi in the lower pole left kidney largest measures 7.3 mm. There are nonobstructing calculi in the lower pole of the right  kidney. In this area this might reflect more of a staghorn calculus measuring 1.5 x 0.7 cm. There are bilateral suggested renal lesions which could reflect cysts difficult to completely characterize on this noncontrasted exam. There is some cortical  thinning to the right kidney.    The bladder does not appear unusual for the degree of distention.    There are some colonic diverticula without change of acute diverticulitis.  Atherosclerotic vascular calcification is present.    There is some bridging along the anterior aspect of the sacroiliac joints. There is facet arthropathy within the lumbar spine.    Impression  Impression:  1.Obstructive uropathy on the left due to a mid left ureteral calculus. There are bilateral nonobstructing intrarenal calculi.  2.Bilateral renal lesions suggestive of cysts difficult completely characterize on this noncontrasted exam.  3.There is some right renal atrophy  4.Colonic diverticulosis.  5.Degenerative change lumbar spine and sacroiliac joints  6.Atherosclerotic vascular calcification  7.Bibasilar atelectasis.            Electronically Signed: Levi Otto MD  4/2/2024 12:22 PM EDT  Workstation ID: [id number]      Lab Review:   Hospital Outpatient Visit on 04/05/2024   Component Date Value    QT Interval 04/05/2024 423     QTC Interval 04/05/2024 451    Lab on 04/05/2024   Component Date Value    Glucose 04/05/2024 121 (H)     BUN 04/05/2024 37 (H)     Creatinine 04/05/2024 1.65 (H)     Sodium 04/05/2024 137     Potassium 04/05/2024 4.4     Chloride 04/05/2024 99     CO2 04/05/2024 27.0     Calcium 04/05/2024  9.9     BUN/Creatinine Ratio 04/05/2024 22.4     Anion Gap 04/05/2024 11.0     eGFR 04/05/2024 45.0 (L)     WBC 04/05/2024 9.98     RBC 04/05/2024 5.80     Hemoglobin 04/05/2024 15.5     Hematocrit 04/05/2024 47.2     MCV 04/05/2024 81.4     MCH 04/05/2024 26.7     MCHC 04/05/2024 32.8     RDW 04/05/2024 13.7     RDW-SD 04/05/2024 39.7     MPV 04/05/2024 10.4     Platelets 04/05/2024 329     Neutrophil % 04/05/2024 79.8 (H)     Lymphocyte % 04/05/2024 13.1 (L)     Monocyte % 04/05/2024 4.4 (L)     Eosinophil % 04/05/2024 1.4     Basophil % 04/05/2024 0.6     Immature Grans % 04/05/2024 0.7 (H)     Neutrophils, Absolute 04/05/2024 7.96 (H)     Lymphocytes, Absolute 04/05/2024 1.31     Monocytes, Absolute 04/05/2024 0.44     Eosinophils, Absolute 04/05/2024 0.14     Basophils, Absolute 04/05/2024 0.06     Immature Grans, Absolute 04/05/2024 0.07 (H)     nRBC 04/05/2024 0.0    Admission on 04/02/2024, Discharged on 04/02/2024   Component Date Value    Extra Tube 04/02/2024 Hold for add-ons.     Extra Tube 04/02/2024 hold for add-on     Extra Tube 04/02/2024 Hold for add-ons.     Extra Tube 04/02/2024 Hold for add-ons.     Glucose 04/02/2024 110 (H)     BUN 04/02/2024 29 (H)     Creatinine 04/02/2024 1.78 (H)     Sodium 04/02/2024 133 (L)     Potassium 04/02/2024 4.4     Chloride 04/02/2024 98     CO2 04/02/2024 25.0     Calcium 04/02/2024 9.6     Total Protein 04/02/2024 7.2     Albumin 04/02/2024 3.1 (L)     ALT (SGPT) 04/02/2024 90 (H)     AST (SGOT) 04/02/2024 48 (H)     Alkaline Phosphatase 04/02/2024 95     Total Bilirubin 04/02/2024 1.2     Globulin 04/02/2024 4.1     A/G Ratio 04/02/2024 0.8     BUN/Creatinine Ratio 04/02/2024 16.3     Anion Gap 04/02/2024 10.0     eGFR 04/02/2024 41.0 (L)     Lipase 04/02/2024 36     Color, UA 04/02/2024 Giovana (A)     Appearance, UA 04/02/2024 Clear     pH, UA 04/02/2024 5.5     Specific Gravity, UA 04/02/2024 1.018     Glucose, UA 04/02/2024 Negative     Ketones, UA 04/02/2024  Trace (A)     Bilirubin, UA 04/02/2024 Small (1+) (A)     Blood, UA 04/02/2024 Small (1+) (A)     Protein, UA 04/02/2024 Trace (A)     Leuk Esterase, UA 04/02/2024 Small (1+) (A)     Nitrite, UA 04/02/2024 Positive (A)     Urobilinogen, UA 04/02/2024 2.0 E.U./dL (A)     WBC 04/02/2024 11.54 (H)     RBC 04/02/2024 5.01     Hemoglobin 04/02/2024 13.5     Hematocrit 04/02/2024 42.4     MCV 04/02/2024 84.6     MCH 04/02/2024 26.9     MCHC 04/02/2024 31.8     RDW 04/02/2024 14.4     RDW-SD 04/02/2024 44.4     MPV 04/02/2024 9.7     Platelets 04/02/2024 259     Neutrophil % 04/02/2024 83.7 (H)     Lymphocyte % 04/02/2024 8.3 (L)     Monocyte % 04/02/2024 5.6     Eosinophil % 04/02/2024 1.0     Basophil % 04/02/2024 0.6     Immature Grans % 04/02/2024 0.8 (H)     Neutrophils, Absolute 04/02/2024 9.66 (H)     Lymphocytes, Absolute 04/02/2024 0.96     Monocytes, Absolute 04/02/2024 0.65     Eosinophils, Absolute 04/02/2024 0.11     Basophils, Absolute 04/02/2024 0.07     Immature Grans, Absolute 04/02/2024 0.09 (H)     nRBC 04/02/2024 0.0     RBC Morphology 04/02/2024 Normal     WBC Morphology 04/02/2024 Normal     Platelet Estimate 04/02/2024 Adequate     RBC, UA 04/02/2024 0-2     WBC, UA 04/02/2024 11-20 (A)     Bacteria, UA 04/02/2024 None Seen     Squamous Epithelial Cell* 04/02/2024 0-2     Hyaline Casts, UA 04/02/2024 0-2     Methodology 04/02/2024 Automated Microscopy      Recent labs reviewed and interpreted for clinical significance and application            Level of Care:           Miky Cruz MD  04/23/24  .

## 2024-04-24 NOTE — OP NOTE
Preoperative Diagnosis: Left Ureteral and Renal Stones  Postoperative Diagnosis: same  Procedures:  Left Ureteroscopy/Laser Lithotripsy/Stone Extraction/Retrograde/Left Stent Exchange (-59 modifier used due to stones treated in multiple locations)  Surgeon: Betsy  Anesthesia: General  Findings: Left Ureteral Stone lasered as well as renal stones  Complications: none  Drains: 7 VL stent    History:  See my H&P    Description of Procedure:  Patient was brought back to the operating room and anesthetized without problems.  A timeout was done and IV antibiotics were given.  He was placed in the lithotomy position and prepped/draped.  Cystoscopy revealed mild BPH and no bladder cancer.  I removed the stent and placed a wire into the kidney.  I went in the ureter with a rigid ureteroscope and encountered the stone.  I used a 200 micron holmium laser fiber and treated the stone with settings of 0.6 J and 6 Hz.  The stone fragments were removed and sent to pathology.  With a safety wire in place, I then went in the kidney and treated 2 stones in lower and midpole of the kidney with the same laser settings.  These were mainly dusted.  There was no injury to the ureter.    I did a retrograde and there was mild hydronephrosis, no filling defects, and no extravasation.    I placed a 7 Trin[place] VL stent with good curls and no string.    He will followup in 1 week for a cysto/stent removal with KUB same day.

## 2024-05-04 LAB
CA CARBONATE CRY STONE QL IR: 100 %
COLOR STONE: NORMAL
COMPN STONE: NORMAL
LABORATORY COMMENT REPORT: NORMAL
LABORATORY COMMENT REPORT: NORMAL
Lab: NORMAL
Lab: NORMAL
PHOTO: NORMAL
SIZE STONE: NORMAL MM
SPEC SOURCE SUBJ: NORMAL
STONE ANALYSIS-IMP: NORMAL
WT STONE: 40 MG

## 2024-08-05 RX ORDER — ASPIRIN 81 MG/1
81 TABLET ORAL NIGHTLY
COMMUNITY

## 2024-08-07 ENCOUNTER — HOSPITAL ENCOUNTER (OUTPATIENT)
Dept: CARDIOLOGY | Facility: HOSPITAL | Age: 69
Discharge: HOME | End: 2024-08-07
Payer: MEDICARE

## 2024-08-07 ENCOUNTER — LAB (OUTPATIENT)
Dept: LAB | Facility: HOSPITAL | Age: 69
End: 2024-08-07
Payer: MEDICARE

## 2024-08-07 LAB
ANION GAP SERPL CALCULATED.3IONS-SCNC: 15 MMOL/L (ref 5–15)
BASOPHILS # BLD AUTO: 0.13 10*3/MM3 (ref 0–0.2)
BASOPHILS NFR BLD AUTO: 1.7 % (ref 0–1.5)
BUN SERPL-MCNC: 26 MG/DL (ref 8–23)
BUN/CREAT SERPL: 19 (ref 7–25)
CALCIUM SPEC-SCNC: 9.8 MG/DL (ref 8.6–10.5)
CHLORIDE SERPL-SCNC: 104 MMOL/L (ref 98–107)
CO2 SERPL-SCNC: 22 MMOL/L (ref 22–29)
CREAT SERPL-MCNC: 1.37 MG/DL (ref 0.76–1.27)
DEPRECATED RDW RBC AUTO: 39.9 FL (ref 37–54)
EGFRCR SERPLBLD CKD-EPI 2021: 55.8 ML/MIN/1.73
EOSINOPHIL # BLD AUTO: 0.22 10*3/MM3 (ref 0–0.4)
EOSINOPHIL NFR BLD AUTO: 2.8 % (ref 0.3–6.2)
ERYTHROCYTE [DISTWIDTH] IN BLOOD BY AUTOMATED COUNT: 13.2 % (ref 12.3–15.4)
GLUCOSE SERPL-MCNC: 109 MG/DL (ref 65–99)
HCT VFR BLD AUTO: 46.7 % (ref 37.5–51)
HGB BLD-MCNC: 15.4 G/DL (ref 13–17.7)
IMM GRANULOCYTES # BLD AUTO: 0.06 10*3/MM3 (ref 0–0.05)
IMM GRANULOCYTES NFR BLD AUTO: 0.8 % (ref 0–0.5)
LYMPHOCYTES # BLD AUTO: 2.47 10*3/MM3 (ref 0.7–3.1)
LYMPHOCYTES NFR BLD AUTO: 31.7 % (ref 19.6–45.3)
MCH RBC QN AUTO: 27.6 PG (ref 26.6–33)
MCHC RBC AUTO-ENTMCNC: 33 G/DL (ref 31.5–35.7)
MCV RBC AUTO: 83.7 FL (ref 79–97)
MONOCYTES # BLD AUTO: 0.46 10*3/MM3 (ref 0.1–0.9)
MONOCYTES NFR BLD AUTO: 5.9 % (ref 5–12)
NEUTROPHILS NFR BLD AUTO: 4.46 10*3/MM3 (ref 1.7–7)
NEUTROPHILS NFR BLD AUTO: 57.1 % (ref 42.7–76)
NRBC BLD AUTO-RTO: 0 /100 WBC (ref 0–0.2)
PLATELET # BLD AUTO: 263 10*3/MM3 (ref 140–450)
PMV BLD AUTO: 11 FL (ref 6–12)
POTASSIUM SERPL-SCNC: 3.9 MMOL/L (ref 3.5–5.2)
QT INTERVAL: 388 MS
QTC INTERVAL: 435 MS
RBC # BLD AUTO: 5.58 10*6/MM3 (ref 4.14–5.8)
SODIUM SERPL-SCNC: 141 MMOL/L (ref 136–145)
WBC NRBC COR # BLD AUTO: 7.8 10*3/MM3 (ref 3.4–10.8)

## 2024-08-07 PROCEDURE — 93005 ELECTROCARDIOGRAM TRACING: CPT | Performed by: UROLOGY

## 2024-08-07 PROCEDURE — 85025 COMPLETE CBC W/AUTO DIFF WBC: CPT | Performed by: UROLOGY

## 2024-08-07 PROCEDURE — 36415 COLL VENOUS BLD VENIPUNCTURE: CPT | Performed by: UROLOGY

## 2024-08-07 PROCEDURE — 80048 BASIC METABOLIC PNL TOTAL CA: CPT

## 2024-08-11 ENCOUNTER — ANESTHESIA EVENT (OUTPATIENT)
Dept: PERIOP | Facility: HOSPITAL | Age: 69
End: 2024-08-11
Payer: MEDICARE

## 2024-08-12 ENCOUNTER — ANESTHESIA (OUTPATIENT)
Dept: PERIOP | Facility: HOSPITAL | Age: 69
End: 2024-08-12
Payer: MEDICARE

## 2024-08-12 ENCOUNTER — APPOINTMENT (OUTPATIENT)
Dept: GENERAL RADIOLOGY | Facility: HOSPITAL | Age: 69
End: 2024-08-12
Payer: MEDICARE

## 2024-08-12 ENCOUNTER — HOSPITAL ENCOUNTER (OUTPATIENT)
Facility: HOSPITAL | Age: 69
Setting detail: HOSPITAL OUTPATIENT SURGERY
Discharge: HOME | End: 2024-08-12
Attending: UROLOGY | Admitting: UROLOGY
Payer: MEDICARE

## 2024-08-12 VITALS
RESPIRATION RATE: 9 BRPM | WEIGHT: 217.4 LBS | OXYGEN SATURATION: 93 % | DIASTOLIC BLOOD PRESSURE: 76 MMHG | SYSTOLIC BLOOD PRESSURE: 118 MMHG | TEMPERATURE: 97.6 F | BODY MASS INDEX: 27.03 KG/M2 | HEART RATE: 67 BPM | HEIGHT: 75 IN

## 2024-08-12 DIAGNOSIS — N20.0 CALCULUS, RENAL: ICD-10-CM

## 2024-08-12 PROCEDURE — 25810000003 LACTATED RINGERS PER 1000 ML: Performed by: UROLOGY

## 2024-08-12 PROCEDURE — C1769 GUIDE WIRE: HCPCS | Performed by: UROLOGY

## 2024-08-12 PROCEDURE — C1894 INTRO/SHEATH, NON-LASER: HCPCS | Performed by: UROLOGY

## 2024-08-12 PROCEDURE — 25010000002 CEFAZOLIN PER 500 MG: Performed by: UROLOGY

## 2024-08-12 PROCEDURE — 0 IOHEXOL 300 MG/ML SOLUTION: Performed by: UROLOGY

## 2024-08-12 PROCEDURE — 25010000002 PROPOFOL 200 MG/20ML EMULSION: Performed by: NURSE ANESTHETIST, CERTIFIED REGISTERED

## 2024-08-12 PROCEDURE — 82365 CALCULUS SPECTROSCOPY: CPT | Performed by: UROLOGY

## 2024-08-12 PROCEDURE — 25010000002 FENTANYL CITRATE (PF) 100 MCG/2ML SOLUTION: Performed by: NURSE ANESTHETIST, CERTIFIED REGISTERED

## 2024-08-12 PROCEDURE — C2617 STENT, NON-COR, TEM W/O DEL: HCPCS | Performed by: UROLOGY

## 2024-08-12 PROCEDURE — 76000 FLUOROSCOPY <1 HR PHYS/QHP: CPT

## 2024-08-12 PROCEDURE — 25010000002 KETOROLAC TROMETHAMINE PER 15 MG: Performed by: NURSE ANESTHETIST, CERTIFIED REGISTERED

## 2024-08-12 PROCEDURE — 25010000002 PHENYLEPHRINE 10 MG/ML SOLUTION: Performed by: NURSE ANESTHETIST, CERTIFIED REGISTERED

## 2024-08-12 DEVICE — VARIABLE LENGTH URETERAL STENT
Type: IMPLANTABLE DEVICE | Site: URETER | Status: FUNCTIONAL
Brand: CONTOUR VL™

## 2024-08-12 RX ORDER — ONDANSETRON 2 MG/ML
4 INJECTION INTRAMUSCULAR; INTRAVENOUS ONCE AS NEEDED
Status: DISCONTINUED | OUTPATIENT
Start: 2024-08-12 | End: 2024-08-12 | Stop reason: HOSPADM

## 2024-08-12 RX ORDER — OXYCODONE HYDROCHLORIDE AND ACETAMINOPHEN 5; 325 MG/1; MG/1
1-2 TABLET ORAL EVERY 6 HOURS PRN
Qty: 15 TABLET | Refills: 0 | Status: SHIPPED | OUTPATIENT
Start: 2024-08-12

## 2024-08-12 RX ORDER — LIDOCAINE HYDROCHLORIDE 10 MG/ML
INJECTION, SOLUTION EPIDURAL; INFILTRATION; INTRACAUDAL; PERINEURAL AS NEEDED
Status: DISCONTINUED | OUTPATIENT
Start: 2024-08-12 | End: 2024-08-12 | Stop reason: SURG

## 2024-08-12 RX ORDER — LIDOCAINE HYDROCHLORIDE 10 MG/ML
0.5 INJECTION, SOLUTION INFILTRATION; PERINEURAL ONCE AS NEEDED
Status: DISCONTINUED | OUTPATIENT
Start: 2024-08-12 | End: 2024-08-12 | Stop reason: HOSPADM

## 2024-08-12 RX ORDER — NALOXONE HCL 0.4 MG/ML
0.4 VIAL (ML) INJECTION AS NEEDED
Status: DISCONTINUED | OUTPATIENT
Start: 2024-08-12 | End: 2024-08-12 | Stop reason: HOSPADM

## 2024-08-12 RX ORDER — LABETALOL HYDROCHLORIDE 5 MG/ML
5 INJECTION, SOLUTION INTRAVENOUS
Status: DISCONTINUED | OUTPATIENT
Start: 2024-08-12 | End: 2024-08-12 | Stop reason: HOSPADM

## 2024-08-12 RX ORDER — DIPHENHYDRAMINE HYDROCHLORIDE 50 MG/ML
12.5 INJECTION INTRAMUSCULAR; INTRAVENOUS ONCE AS NEEDED
Status: DISCONTINUED | OUTPATIENT
Start: 2024-08-12 | End: 2024-08-12 | Stop reason: HOSPADM

## 2024-08-12 RX ORDER — PROPOFOL 10 MG/ML
INJECTION, EMULSION INTRAVENOUS AS NEEDED
Status: DISCONTINUED | OUTPATIENT
Start: 2024-08-12 | End: 2024-08-12 | Stop reason: SURG

## 2024-08-12 RX ORDER — OXYCODONE HYDROCHLORIDE 5 MG/1
5 TABLET ORAL ONCE AS NEEDED
Status: DISCONTINUED | OUTPATIENT
Start: 2024-08-12 | End: 2024-08-12 | Stop reason: HOSPADM

## 2024-08-12 RX ORDER — DIPHENHYDRAMINE HYDROCHLORIDE 50 MG/ML
12.5 INJECTION INTRAMUSCULAR; INTRAVENOUS
Status: DISCONTINUED | OUTPATIENT
Start: 2024-08-12 | End: 2024-08-12 | Stop reason: HOSPADM

## 2024-08-12 RX ORDER — SODIUM CHLORIDE, SODIUM LACTATE, POTASSIUM CHLORIDE, CALCIUM CHLORIDE 600; 310; 30; 20 MG/100ML; MG/100ML; MG/100ML; MG/100ML
1000 INJECTION, SOLUTION INTRAVENOUS CONTINUOUS
Status: DISCONTINUED | OUTPATIENT
Start: 2024-08-12 | End: 2024-08-12 | Stop reason: HOSPADM

## 2024-08-12 RX ORDER — KETOROLAC TROMETHAMINE 30 MG/ML
INJECTION, SOLUTION INTRAMUSCULAR; INTRAVENOUS AS NEEDED
Status: DISCONTINUED | OUTPATIENT
Start: 2024-08-12 | End: 2024-08-12 | Stop reason: SURG

## 2024-08-12 RX ORDER — SODIUM CHLORIDE 0.9 % (FLUSH) 0.9 %
10 SYRINGE (ML) INJECTION AS NEEDED
Status: DISCONTINUED | OUTPATIENT
Start: 2024-08-12 | End: 2024-08-12 | Stop reason: HOSPADM

## 2024-08-12 RX ORDER — IPRATROPIUM BROMIDE AND ALBUTEROL SULFATE 2.5; .5 MG/3ML; MG/3ML
3 SOLUTION RESPIRATORY (INHALATION) ONCE AS NEEDED
Status: DISCONTINUED | OUTPATIENT
Start: 2024-08-12 | End: 2024-08-12 | Stop reason: HOSPADM

## 2024-08-12 RX ORDER — PHENYLEPHRINE HYDROCHLORIDE 10 MG/ML
INJECTION INTRAVENOUS AS NEEDED
Status: DISCONTINUED | OUTPATIENT
Start: 2024-08-12 | End: 2024-08-12 | Stop reason: SURG

## 2024-08-12 RX ORDER — HYDRALAZINE HYDROCHLORIDE 20 MG/ML
5 INJECTION INTRAMUSCULAR; INTRAVENOUS
Status: DISCONTINUED | OUTPATIENT
Start: 2024-08-12 | End: 2024-08-12 | Stop reason: HOSPADM

## 2024-08-12 RX ORDER — OXYCODONE HYDROCHLORIDE 5 MG/1
10 TABLET ORAL EVERY 4 HOURS PRN
Status: DISCONTINUED | OUTPATIENT
Start: 2024-08-12 | End: 2024-08-12 | Stop reason: HOSPADM

## 2024-08-12 RX ORDER — FENTANYL CITRATE 50 UG/ML
INJECTION, SOLUTION INTRAMUSCULAR; INTRAVENOUS AS NEEDED
Status: DISCONTINUED | OUTPATIENT
Start: 2024-08-12 | End: 2024-08-12 | Stop reason: SURG

## 2024-08-12 RX ORDER — CEFDINIR 300 MG/1
300 CAPSULE ORAL 2 TIMES DAILY
Qty: 14 CAPSULE | Refills: 0 | Status: SHIPPED | OUTPATIENT
Start: 2024-08-12 | End: 2024-08-19

## 2024-08-12 RX ORDER — EPHEDRINE SULFATE 5 MG/ML
5 INJECTION INTRAVENOUS ONCE AS NEEDED
Status: DISCONTINUED | OUTPATIENT
Start: 2024-08-12 | End: 2024-08-12 | Stop reason: HOSPADM

## 2024-08-12 RX ADMIN — PHENYLEPHRINE HYDROCHLORIDE 100 MCG: 10 INJECTION INTRAVENOUS at 09:35

## 2024-08-12 RX ADMIN — PROPOFOL 200 MG: 10 INJECTION, EMULSION INTRAVENOUS at 09:22

## 2024-08-12 RX ADMIN — SODIUM CHLORIDE 2000 MG: 900 INJECTION INTRAVENOUS at 09:13

## 2024-08-12 RX ADMIN — LIDOCAINE HYDROCHLORIDE 50 MG: 10 INJECTION, SOLUTION EPIDURAL; INFILTRATION; INTRACAUDAL; PERINEURAL at 09:22

## 2024-08-12 RX ADMIN — SODIUM CHLORIDE, POTASSIUM CHLORIDE, SODIUM LACTATE AND CALCIUM CHLORIDE 1000 ML: 600; 310; 30; 20 INJECTION, SOLUTION INTRAVENOUS at 09:13

## 2024-08-12 RX ADMIN — FENTANYL CITRATE 100 MCG: 50 INJECTION, SOLUTION INTRAMUSCULAR; INTRAVENOUS at 09:22

## 2024-08-12 RX ADMIN — PHENYLEPHRINE HYDROCHLORIDE 100 MCG: 10 INJECTION INTRAVENOUS at 09:45

## 2024-08-12 RX ADMIN — KETOROLAC TROMETHAMINE 30 MG: 30 INJECTION, SOLUTION INTRAMUSCULAR at 09:54

## 2024-08-12 NOTE — ANESTHESIA PREPROCEDURE EVALUATION
Anesthesia Evaluation     Patient summary reviewed and Nursing notes reviewed   NPO Solid Status: > 8 hours  NPO Liquid Status: > 8 hours           Airway   Mallampati: II  TM distance: >3 FB  Neck ROM: full  No difficulty expected  Dental    (+) poor dentition        Pulmonary - negative pulmonary ROS and normal exam    breath sounds clear to auscultation  Cardiovascular - normal exam  Exercise tolerance: unable to assess    ECG reviewed  Rhythm: regular  Rate: normal    (+) hypertension, past MI , CAD, cardiac stents more than 12 months ago , hyperlipidemia    ROS comment: NL ECHO    S/P Stent    Angiography  1.  Left main nonobstructive disease less than 10 to 20% mild luminal regularities  2.  LAD diffusely diseased proximally at the takeoff of a high diagonal branch there is a 40% narrowing, there is 30 to 40% diffuse luminal regularities throughout the proximal mid into the distal LAD with diffuse atherosclerosis but nothing is obstructive, most severe lesion is again 50% in the proximal LAD flanking the bifurcation of the diagonal branch, ostium of the diagonal branch difficult to lay out but appears to have 70% stenosis, the body and distal portion of diagonal diffuse limb irregularities less than 30% with SERAFIN-3 flow, SERAFIN-3 flow in the LAD and diagonal branch and septals, LAD wraps around the apex  3.  Circumflex is a nondominant vessel with a large obtuse marginal with 4 branches distally along the lateral wall, proximal circumflex has diffuse limb regularities, proximally 50 to 60% eccentric plaque, distally the continuation circumflex is very small after the takeoff of this very large obtuse marginal branch which is the dominant supply of the lateral wall.  Proximal obtuse marginal branch has 99% eccentric stenosis with slightly less and SERAFIN-3 flow distally.  Distal to this lesion there is not appear to be flow-limiting stenosis of the obtuse marginal branch or right subsegmental branches.  4.  The  RCA is a dominant branch with PDA and PLV branch distally, the RCA has diffuse atherosclerotic disease 40% with diffuse luminal irregularities throughout the PDA and PLV branches but nothing obstructive, nothing greater than 30% or so.  SERAFIN-3 flow throughout     Conclusions and recommendations next   #1 non-ST elevation myocardial infarction with culprit of mid circumflex into first obtuse marginal branch 99% stenosis  2.  Successful intervention described above with placement of overlapping Xience drug-eluting stents 2.5 x 28 and 3.0 x 28 from the obtuse marginal branch back to proximal circumflex postdilated with 3.5 mm NC balloon up to 18 to 20 corry proximally.    #3 dual endplate with aspirin Brilinta  4.  Advance goal-directed medical therapy after acute MI as indicated by guidelines  5.  Discussed heart healthy diet with the patient at length along with diet and exercise when released by cardiology ultimately per AHA guidelines  6.  High intensity statin therapy will be pursued          Neuro/Psych- negative ROS  GI/Hepatic/Renal/Endo    (+) obesity, renal disease- CRI and stones    Musculoskeletal (-) negative ROS    Abdominal  - normal exam   Substance History - negative use     OB/GYN negative ob/gyn ROS         Other - negative ROS       ROS/Med Hx Other: · Estimated left ventricular EF was in agreement with the calculated left ventricular EF. Left ventricular ejection fraction appears to be 51 - 55%. Left ventricular systolic function is normal.  · Left ventricular diastolic function was normal.  · Estimated right ventricular systolic pressure from tricuspid regurgitation is normal (<35 mmHg).                   Anesthesia Plan    ASA 3     general     (LMA)  intravenous induction     Anesthetic plan, risks, benefits, and alternatives have been provided, discussed and informed consent has been obtained with: patient.    Plan discussed with CRNA.    CODE STATUS:

## 2024-08-12 NOTE — ANESTHESIA PROCEDURE NOTES
Airway  Urgency: elective    Date/Time: 8/12/2024 9:23 AM  Airway not difficult    General Information and Staff    Patient location during procedure: OR    Indications and Patient Condition  Indications for airway management: airway protection    Preoxygenated: yes  MILS maintained throughout  Mask difficulty assessment: 1 - vent by mask    Final Airway Details  Final airway type: supraglottic airway      Successful airway: I-gel  Size 5     Number of attempts at approach: 1  Assessment: lips, teeth, and gum same as pre-op and atraumatic intubation

## 2024-08-12 NOTE — ANESTHESIA POSTPROCEDURE EVALUATION
Patient: Gil Garcia    Procedure Summary       Date: 08/12/24 Room / Location: [facility] OR 06 / [facility] MAIN OR    Anesthesia Start: 0920 Anesthesia Stop: 1009    Procedure: CYSTOSCOPY URETEROSCOPY LASER LITHOTRIPSY, STONE BASKET EXTRACTION,STENT placement x2 right side (Right) Diagnosis:       Calculus, renal      (Calculus, renal [N20.0])    Surgeons: Tejas Meyer MD Provider: Sha Billings MD    Anesthesia Type: general ASA Status: 3            Anesthesia Type: general    Vitals  Vitals Value Taken Time   /85 08/12/24 1052   Temp 97.4 °F (36.3 °C) 08/12/24 1049   Pulse 65 08/12/24 1103   Resp 14 08/12/24 1049   SpO2 95 % 08/12/24 1103   Vitals shown include unfiled device data.        Post Anesthesia Care and Evaluation    Patient location during evaluation: PACU  Patient participation: complete - patient participated  Level of consciousness: awake  Pain scale: See nurse's notes for pain score.  Pain management: adequate    Airway patency: patent  Anesthetic complications: No anesthetic complications  PONV Status: none  Cardiovascular status: acceptable  Respiratory status: acceptable and spontaneous ventilation  Hydration status: acceptable    Comments: Patient seen and examined postoperatively; vital signs stable; SpO2 greater than or equal to 90%; cardiopulmonary status stable; nausea/vomiting adequately controlled; pain adequately controlled; no apparent anesthesia complications; patient discharged from anesthesia care when discharge criteria were met

## 2024-08-12 NOTE — OP NOTE
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 Has stents in both moieties.  2.   Stents can be removed in 1 week with no imaging prior - will be with one of my partners and he knows this  Complications: none    History:   See my H&P    Description of Procedure:  The patient was brought back to the operating room and anesthetized without problems.  He was place in the lithotomy position and prepped/draped.  Cystoscopy revealed mild BPH and no bladder cancer.  The stent was removed and a wire was placed.  Retrograde revealed a Y dupication in the midureter.    I went up with a rigid ureteroscope and saw the large stone at the duplication point.  I took a 200 micron holmium laser fiber and broke the stone up into multiple pieces that were then removed.  I cleared the upper and lower pole moieties with the flexible ureteroscope.    I then placed a 4.8 Occitan VL stent in both moieites.    There were no immediate complications and the patient will have a cysto/stent removal x 2 in 1 week with one of my partners.

## 2024-08-12 NOTE — H&P
Urology History and Physical    Patient Identification:  Name:  Gil Garcia  Age:  69 y.o.  Sex:  male  :  1955  MRN:  [id number]    Chief Complaint:  Here for the stones    70 yo with right ureteral stone s/p stent  Here for URS  Feels normal    History of Present Illness:    Problem List:    * No active hospital problems. *    Past Medical History:  Past Medical History:   Diagnosis Date    Coronary artery disease involving coronary bypass graft of native heart with unstable angina pectoris 2021    Hyperlipidemia     Hypertension     Kidney stone     Myocardial infarction 2021    UTI (urinary tract infection)      Past Surgical History:  Past Surgical History:   Procedure Laterality Date    APPENDECTOMY      CARDIAC CATHETERIZATION N/A 08/15/2021    Procedure: Left Heart Cath;  Surgeon: Miky Cruz MD;  Location: [facility] CATH INVASIVE LOCATION;  Service: Cardiology;  Laterality: N/A;    CARDIAC CATHETERIZATION N/A 08/15/2021    Procedure: Percutaneous Coronary Intervention;  Surgeon: Miky Cruz MD;  Location: [facility] CATH INVASIVE LOCATION;  Service: Cardiology;  Laterality: N/A;    CORONARY STENT PLACEMENT  08/15/2021    CYSTOSCOPY, URETEROSCOPY, RETROGRADE PYELOGRAM, STENT INSERTION Left 2024    Procedure: CYSTOSCOPY URETEROSCOPY LASER LITHOTRIPSY, STONE BASKET EXTRACTION, STENT INSERTION;  Surgeon: Tejas Meyer MD;  Location: [facility] OR;  Service: Urology;  Laterality: Left;    KIDNEY STONE SURGERY       Home Meds:  Medications Prior to Admission   Medication Sig Dispense Refill Last Dose    aspirin 81 MG EC tablet Take 1 tablet by mouth Every Night.       atorvastatin (LIPITOR) 20 MG tablet Take 1 tablet by mouth Daily. (Patient taking differently: Take 1 tablet by mouth Every Night.) 90 tablet 3 2024    metoprolol succinate XL (TOPROL-XL) 25 MG 24 hr tablet Take 1 tablet by mouth Every Night.   2024    Phenazopyridine HCl (AZO  "URINARY PAIN RELIEF PO) Take 2 tablets by mouth 3 times a day.       oxyCODONE-acetaminophen (Percocet) 7.5-325 MG per tablet Take 1 tablet by mouth Every 8 (Eight) Hours As Needed for Moderate Pain for up to 12 doses. (Patient not taking: Reported on 2024) 20 tablet 0 Not Taking     Current Meds:    Current Facility-Administered Medications:     ceFAZolin 2000 mg IVPB in 100 mL NS (MBP), 2,000 mg, Intravenous, Once, Tejas Meyer MD, 2,000 mg at 24    lactated ringers infusion 1,000 mL, 1,000 mL, Intravenous, Continuous, Tejas Meyer MD, Last Rate: 25 mL/hr at 24, 1,000 mL at 24    lidocaine (XYLOCAINE) 1 % injection 0.5 mL, 0.5 mL, Intradermal, Once PRN, Tejas Meyer MD    sodium chloride 0.9 % flush 10 mL, 10 mL, Intravenous, PRN, Tejas Meyer MD  Allergies:  Patient has no known allergies.    Review of Systems:  Negative 12 point system review except: none    Objective:  tMax 24 hours:  Temp (24hrs), Av.4 °F (36.3 °C), Min:97.4 °F (36.3 °C), Max:97.4 °F (36.3 °C)    Vital Sign Ranges:  Temp:  [97.4 °F (36.3 °C)] 97.4 °F (36.3 °C)  Heart Rate:  [77] 77  Resp:  [13] 13  BP: (141)/(92) 141/92  Intake and Output Last 3 Shifts:  No intake/output data recorded.    Exam:  /92   Pulse 77   Temp 97.4 °F (36.3 °C)   Resp 13   Ht 190.5 cm (75\")   Wt 98.6 kg (217 lb 6.4 oz)   SpO2 95%   BMI 27.17 kg/m²    General Appearance:    Alert, cooperative, no acute distress, general       appearance is normal   Head:    Normocephalic, without obvious abnormality, atraumatic   Eyes:            Pupils/Irises normal. Exterior inspection conjunctivae       and lids normal.   Ears:    Normal external inspection   Nose:   Exterior inspection of nose is normal   Throat:   Lips, mucosa, and tongue normal   Neck:   No adenopathy, supple, symmetrical, trachea midline   Back:     No CVA tenderness   Lungs:     Respirations unlabored; normal effort, no audible      "  abnormality   CV:   Regular rhythm and normal rate, no edema   Abdomen:     No hernia, examination of the abdomen is normal with     no  masses, tenderness, or distension    Male :  deferred    Musculoskeletal:   Inspection of the nails and digits reveal no abnormalities   Skin:   Inspection normal, palpation normal   Neurologic/Psych:   Orientation normal; Mood/Affect normal     Data Review:  All labs (24hrs):    Lab Results (last 24 hours)       ** No results found for the last 24 hours. **          Radiology:   Imaging Results (Last 72 Hours)       ** No results found for the last 72 hours. **            Assessment:  Active Problems:    * No active hospital problems. *      Right Ureteral Stones    Proceed with right URS/SBE      Tejas Meyer MD  8/12/2024  09:25 EDT

## 2024-08-28 LAB
COLOR STONE: NORMAL
COM MFR STONE: 5 %
COMPN STONE: NORMAL
HYDROXYAPATITE: 95 %
LABORATORY COMMENT REPORT: NORMAL
LABORATORY COMMENT REPORT: NORMAL
Lab: NORMAL
Lab: NORMAL
PHOTO: NORMAL
SIZE STONE: NORMAL MM
SPEC SOURCE SUBJ: NORMAL
STONE ANALYSIS-IMP: NORMAL
WT STONE: 35 MG

## 2024-11-05 RX ORDER — METOPROLOL SUCCINATE 25 MG/1
25 TABLET, EXTENDED RELEASE ORAL NIGHTLY
OUTPATIENT
Start: 2024-11-05

## 2024-11-12 ENCOUNTER — PATIENT MESSAGE (OUTPATIENT)
Dept: CARDIOLOGY | Facility: CLINIC | Age: 69
End: 2024-11-12
Payer: MEDICARE

## 2024-11-12 RX ORDER — METOPROLOL SUCCINATE 25 MG/1
25 TABLET, EXTENDED RELEASE ORAL NIGHTLY
Qty: 90 TABLET | Refills: 0 | Status: SHIPPED | OUTPATIENT
Start: 2024-11-12

## 2024-11-21 ENCOUNTER — OFFICE VISIT (OUTPATIENT)
Dept: CARDIOLOGY | Facility: CLINIC | Age: 69
End: 2024-11-21
Payer: MEDICARE

## 2024-11-21 VITALS
DIASTOLIC BLOOD PRESSURE: 78 MMHG | HEIGHT: 75 IN | BODY MASS INDEX: 26.86 KG/M2 | OXYGEN SATURATION: 93 % | RESPIRATION RATE: 18 BRPM | HEART RATE: 84 BPM | SYSTOLIC BLOOD PRESSURE: 126 MMHG | WEIGHT: 216 LBS

## 2024-11-21 DIAGNOSIS — I25.10 CORONARY ARTERY DISEASE INVOLVING NATIVE CORONARY ARTERY OF NATIVE HEART WITHOUT ANGINA PECTORIS: Primary | ICD-10-CM

## 2024-11-21 DIAGNOSIS — Z09 FOLLOW-UP EXAM: ICD-10-CM

## 2024-11-21 PROCEDURE — 99214 OFFICE O/P EST MOD 30 MIN: CPT | Performed by: NURSE PRACTITIONER

## 2024-11-21 RX ORDER — METOPROLOL SUCCINATE 25 MG/1
25 TABLET, EXTENDED RELEASE ORAL NIGHTLY
Qty: 90 TABLET | Refills: 3 | Status: SHIPPED | OUTPATIENT
Start: 2024-11-21

## 2024-11-22 PROBLEM — I25.10 CORONARY ARTERY DISEASE INVOLVING NATIVE CORONARY ARTERY OF NATIVE HEART WITHOUT ANGINA PECTORIS: Status: ACTIVE | Noted: 2021-09-30

## 2024-11-22 NOTE — PROGRESS NOTES
Cardiology Office Follow Up Visit      Primary Care Provider:  Flavia Duggan APRN    Reason for f/u:     Routine follow up, CAD      Subjective     CC:    Follow up    History of Present Illness       Gil Garcia is a 69 y.o. male who is a patient of Dr. Cruz. Pmh includes CAD s/p NSTEMI 2021 s/p MATTHEW overlapping from OM leading back to Lcx. He also has dyslipidemia. He is tolerating low dose statin therapy presently. He is a former smoker.  Per prior notes: culprit was OM leading back to circumflex requiring overlapping Xience drug-eluting stents with great angiographic results at that time, EF was 50% with mild lateral wall hypokinesis, limited injury with peak troponin of 2.  His LAD and diagonal did have some disease at the bifurcation, LAD is diffusely diseased with luminal regularities throughout possibly 30 to 40% with ostial diagonal possibly 80% but both vessels with SERAFIN-3 flow.     He presents today for routine follow up. He has no acute complaints. Blood pressure controlled. He denies angina or exertional symptoms. No shortness of breath or lower extremity edema.           ASSESSMENT/PLAN:      Diagnoses and all orders for this visit:    1. Coronary artery disease involving native coronary artery of native heart without angina pectoris (Primary)    2. Follow-up exam    Other orders  -     metoprolol succinate XL (TOPROL-XL) 25 MG 24 hr tablet; Take 1 tablet by mouth Every Night.  Dispense: 90 tablet; Refill: 3    3. Dyslipidemia  4. Preserved LVEF      MEDICAL DECISION MAKING:  Patient is stable from a CV Standpoint. Continue ASA / statin / BB. Secondary prevention. Lifestyle modifications. Normal LVEF. B/p controlled. F/u in 1 year or sooner should new issues arise.           Past Medical History:   Diagnosis Date    Coronary artery disease involving coronary bypass graft of native heart with unstable angina pectoris 09/30/2021    Hyperlipidemia     Hypertension     Kidney stone     Myocardial  infarction 08/14/2021    UTI (urinary tract infection)        Past Surgical History:   Procedure Laterality Date    APPENDECTOMY  1977    CARDIAC CATHETERIZATION N/A 08/15/2021    Procedure: Left Heart Cath;  Surgeon: Miky Cruz MD;  Location: [facility] CATH INVASIVE LOCATION;  Service: Cardiology;  Laterality: N/A;    CARDIAC CATHETERIZATION N/A 08/15/2021    Procedure: Percutaneous Coronary Intervention;  Surgeon: Miky Cruz MD;  Location: [facility] CATH INVASIVE LOCATION;  Service: Cardiology;  Laterality: N/A;    CORONARY STENT PLACEMENT  08/15/2021    CYSTOSCOPY, URETEROSCOPY, RETROGRADE PYELOGRAM, STENT INSERTION Left 4/22/2024    Procedure: CYSTOSCOPY URETEROSCOPY LASER LITHOTRIPSY, STONE BASKET EXTRACTION, STENT INSERTION;  Surgeon: Tejas Meyer MD;  Location: [facility];  Service: Urology;  Laterality: Left;    CYSTOSCOPY, URETEROSCOPY, RETROGRADE PYELOGRAM, STENT INSERTION Right 8/12/2024    Procedure: CYSTOSCOPY URETEROSCOPY LASER LITHOTRIPSY, STONE BASKET EXTRACTION,STENT placement x2 right side;  Surgeon: Tejas Meyer MD;  Location: [facility];  Service: Urology;  Laterality: Right;    KIDNEY STONE SURGERY           Current Outpatient Medications:     aspirin 81 MG EC tablet, Take 1 tablet by mouth Every Night., Disp: , Rfl:     atorvastatin (LIPITOR) 20 MG tablet, Take 1 tablet by mouth Daily. (Patient taking differently: Take 1 tablet by mouth Every Night.), Disp: 90 tablet, Rfl: 3    metoprolol succinate XL (TOPROL-XL) 25 MG 24 hr tablet, Take 1 tablet by mouth Every Night., Disp: 90 tablet, Rfl: 3    oxyCODONE-acetaminophen (PERCOCET) 5-325 MG per tablet, Take 1-2 tablets by mouth Every 6 (Six) Hours As Needed for Moderate Pain., Disp: 15 tablet, Rfl: 0    Phenazopyridine HCl (AZO URINARY PAIN RELIEF PO), Take 2 tablets by mouth 3 times a day., Disp: , Rfl:     Social History     Socioeconomic History    Marital status:    Tobacco Use    Smoking  "status: Former     Current packs/day: 0.00     Average packs/day: 0.5 packs/day for 20.0 years (10.0 ttl pk-yrs)     Types: Cigarettes     Start date:      Quit date:      Years since quittin.9     Passive exposure: Past    Smokeless tobacco: Never   Vaping Use    Vaping status: Never Used   Substance and Sexual Activity    Alcohol use: Not Currently     Comment: occasional    Drug use: Never    Sexual activity: Defer       Family History   Problem Relation Age of Onset    Other Mother     Hyperlipidemia Mother     Other Sister     Other Brother     Heart attack Father     Heart disease Father        The following portions of the patient's history were reviewed and updated as appropriate: allergies, current medications, past family history, past medical history, past social history, past surgical history and problem list.    Review of Systems   Constitutional: Negative for chills, diaphoresis and malaise/fatigue.   Cardiovascular:  Negative for chest pain, dyspnea on exertion, irregular heartbeat, leg swelling, near-syncope, orthopnea, palpitations, paroxysmal nocturnal dyspnea and syncope.   Respiratory:  Negative for cough, shortness of breath, sleep disturbances due to breathing and sputum production.    Gastrointestinal:  Negative for change in bowel habit.   Genitourinary:  Negative for urgency.   Neurological:  Negative for dizziness and headaches.   Psychiatric/Behavioral:  Negative for altered mental status.        Pertinent items are noted in HPI, all other systems reviewed and negative    /78 (BP Location: Right arm, Patient Position: Sitting, Cuff Size: Large Adult)   Pulse 84   Resp 18   Ht 190.5 cm (75\")   Wt 98 kg (216 lb)   SpO2 93%   BMI 27.00 kg/m² .  Objective     Constitutional:       Appearance: Not in distress.   Neck:      Vascular: JVD normal.   Pulmonary:      Effort: Pulmonary effort is normal.      Breath sounds: Normal breath sounds.   Cardiovascular:      Normal " rate. Regular rhythm.   Pulses:     Intact distal pulses.   Edema:     Peripheral edema absent.   Abdominal:      General: Bowel sounds are normal.      Palpations: Abdomen is soft.   Musculoskeletal: Normal range of motion. Skin:     General: Skin is warm and dry.   Neurological:      General: No focal deficit present.      Mental Status: Oriented to person, place and time.           Procedures    EKG ordered by and reviewed by me in office

## 2025-04-29 RX ORDER — METOPROLOL SUCCINATE 25 MG/1
25 TABLET, EXTENDED RELEASE ORAL NIGHTLY
Qty: 90 TABLET | Refills: 3 | Status: SHIPPED | OUTPATIENT
Start: 2025-04-29

## (undated) DEVICE — CONTRST ISOVUE300 61PCT 50ML

## (undated) DEVICE — KT SURG TURNOVER 050

## (undated) DEVICE — CATH GUIDE LAUNCHER EBU4.0 6F 100CM

## (undated) DEVICE — SYS IRR PUMP SGL ACTN VAC SYR 10CC

## (undated) DEVICE — CATH DIAG IMPULSE PIG .056 6F 110CM

## (undated) DEVICE — NC TREK CORONARY DILATATION CATHETER 2.5 MM X 12 MM / RAPID-EXCHANGE: Brand: NC TREK

## (undated) DEVICE — PRT BIOP SEALS

## (undated) DEVICE — MEDICINE CUP, GRADUATED, STER: Brand: MEDLINE

## (undated) DEVICE — PK CYSTO 50

## (undated) DEVICE — ELECTRD DEFIB M/FUNC PROPADZ RADIOL 2PK

## (undated) DEVICE — SOLUTION,WATER,IRRIGATION,1000ML,STERILE: Brand: MEDLINE

## (undated) DEVICE — CATH DIAG IMPULSE FR4 6F 100CM

## (undated) DEVICE — SYR LUERLOK 30CC

## (undated) DEVICE — PK TRY HEART CATH 50

## (undated) DEVICE — FIBR LASR HOLMIUM 200 MICRON DISP

## (undated) DEVICE — NC TREK CORONARY DILATATION CATHETER 3.5 MM X 15 MM / RAPID-EXCHANGE: Brand: NC TREK

## (undated) DEVICE — PINNACLE INTRODUCER SHEATH: Brand: PINNACLE

## (undated) DEVICE — SOL IRRG H2O BG 3000ML STRL

## (undated) DEVICE — NITINOL STONE RETRIEVAL BASKET: Brand: ZERO TIP

## (undated) DEVICE — TBG NAMIC PRESS MONTR A/ F/M 12IN

## (undated) DEVICE — BALN NC/EUPHORA RX 3.00X15MM

## (undated) DEVICE — ANGIO-SEAL VIP VASCULAR CLOSURE DEVICE: Brand: ANGIO-SEAL

## (undated) DEVICE — GLV SURG SIGNATURE ESSENTIAL PF LTX SZ6.5

## (undated) DEVICE — CATH DIAG IMPULSE FL4 6F 100CM

## (undated) DEVICE — URETERAL ACCESS SHEATH SET: Brand: NAVIGATOR HD

## (undated) DEVICE — PREP SPRY PVPI 10P 2OZ

## (undated) DEVICE — ST ACC MICROPUNCTURE STFF/CANN PLAT/TP 4F 21G 40CM

## (undated) DEVICE — NITINOL WIRE WITH HYDROPHILIC TIP: Brand: SENSOR

## (undated) DEVICE — STPCK 3WY HP ROT

## (undated) DEVICE — GW AMPLTZ SUPRSTF PTFE JB .035 7X145CM

## (undated) DEVICE — TUBING, SUCTION, 1/4" X 12', STRAIGHT: Brand: MEDLINE

## (undated) DEVICE — GW PTFE EMERALD HEPCOAT FC J TIP STD .035 3MM 150CM

## (undated) DEVICE — GW RUNTHROUGH NS HYPERCOAT .014 3X180CM

## (undated) DEVICE — DUAL LUMEN URETERAL CATHETER

## (undated) DEVICE — BOWL PLSTC MD 16OZ BLU STRL

## (undated) DEVICE — DEV INFL COMPAK W/ACCESSPLUS IN4530